# Patient Record
Sex: FEMALE | Race: WHITE | NOT HISPANIC OR LATINO | URBAN - METROPOLITAN AREA
[De-identification: names, ages, dates, MRNs, and addresses within clinical notes are randomized per-mention and may not be internally consistent; named-entity substitution may affect disease eponyms.]

---

## 2014-09-30 RX ORDER — ATORVASTATIN CALCIUM 80 MG/1
1 TABLET, FILM COATED ORAL
Qty: 0 | Refills: 0 | COMMUNITY
Start: 2014-09-30

## 2017-01-01 ENCOUNTER — INPATIENT (INPATIENT)
Facility: HOSPITAL | Age: 82
LOS: 3 days | Discharge: HOME | DRG: 185 | End: 2017-01-05
Attending: HOSPITALIST | Admitting: HOSPITALIST
Payer: MEDICARE

## 2017-01-01 VITALS
HEART RATE: 92 BPM | SYSTOLIC BLOOD PRESSURE: 128 MMHG | OXYGEN SATURATION: 94 % | DIASTOLIC BLOOD PRESSURE: 74 MMHG | TEMPERATURE: 98 F | RESPIRATION RATE: 18 BRPM
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
## 2017-01-01 DIAGNOSIS — Z41.8 ENCOUNTER FOR OTHER PROCEDURES FOR PURPOSES OTHER THAN REMEDYING HEALTH STATE: ICD-10-CM

## 2017-01-01 DIAGNOSIS — I10 ESSENTIAL (PRIMARY) HYPERTENSION: ICD-10-CM

## 2017-01-01 DIAGNOSIS — N18.9 CHRONIC KIDNEY DISEASE, UNSPECIFIED: ICD-10-CM

## 2017-01-01 DIAGNOSIS — G40.909 EPILEPSY, UNSPECIFIED, NOT INTRACTABLE, WITHOUT STATUS EPILEPTICUS: ICD-10-CM

## 2017-01-01 LAB
ALBUMIN SERPL ELPH-MCNC: 4 G/DL — SIGNIFICANT CHANGE UP (ref 3.3–5)
ALP SERPL-CCNC: 87 U/L — SIGNIFICANT CHANGE UP (ref 40–120)
ALT FLD-CCNC: 15 U/L RC — SIGNIFICANT CHANGE UP (ref 10–45)
ANION GAP SERPL CALC-SCNC: 16 MMOL/L — SIGNIFICANT CHANGE UP (ref 5–17)
APPEARANCE UR: CLEAR — SIGNIFICANT CHANGE UP
APTT BLD: 37.4 SEC — SIGNIFICANT CHANGE UP (ref 27.5–37.4)
AST SERPL-CCNC: 24 U/L — SIGNIFICANT CHANGE UP (ref 10–40)
BACTERIA # UR AUTO: ABNORMAL /HPF
BASOPHILS # BLD AUTO: 0 K/UL — SIGNIFICANT CHANGE UP (ref 0–0.2)
BASOPHILS NFR BLD AUTO: 0.5 % — SIGNIFICANT CHANGE UP (ref 0–2)
BILIRUB SERPL-MCNC: 0.3 MG/DL — SIGNIFICANT CHANGE UP (ref 0.2–1.2)
BILIRUB UR-MCNC: NEGATIVE — SIGNIFICANT CHANGE UP
BUN SERPL-MCNC: 51 MG/DL — HIGH (ref 7–23)
CALCIUM SERPL-MCNC: 9.3 MG/DL — SIGNIFICANT CHANGE UP (ref 8.4–10.5)
CHLORIDE SERPL-SCNC: 102 MMOL/L — SIGNIFICANT CHANGE UP (ref 96–108)
CK MB BLD-MCNC: 3.2 % — SIGNIFICANT CHANGE UP (ref 0–3.5)
CK MB CFR SERPL CALC: 3 NG/ML — SIGNIFICANT CHANGE UP (ref 0–3.8)
CK MB CFR SERPL CALC: 3.7 NG/ML — SIGNIFICANT CHANGE UP (ref 0–3.8)
CK SERPL-CCNC: 114 U/L — SIGNIFICANT CHANGE UP (ref 25–170)
CK SERPL-CCNC: 87 U/L — SIGNIFICANT CHANGE UP (ref 25–170)
CO2 SERPL-SCNC: 23 MMOL/L — SIGNIFICANT CHANGE UP (ref 22–31)
COLOR SPEC: YELLOW — SIGNIFICANT CHANGE UP
CREAT SERPL-MCNC: 1.34 MG/DL — HIGH (ref 0.5–1.3)
DIFF PNL FLD: NEGATIVE — SIGNIFICANT CHANGE UP
EOSINOPHIL # BLD AUTO: 0.2 K/UL — SIGNIFICANT CHANGE UP (ref 0–0.5)
EOSINOPHIL NFR BLD AUTO: 2.3 % — SIGNIFICANT CHANGE UP (ref 0–6)
GAS PNL BLDV: SIGNIFICANT CHANGE UP
GLUCOSE SERPL-MCNC: 122 MG/DL — HIGH (ref 70–99)
GLUCOSE UR QL: NEGATIVE — SIGNIFICANT CHANGE UP
HCT VFR BLD CALC: 38.6 % — SIGNIFICANT CHANGE UP (ref 34.5–45)
HGB BLD-MCNC: 12.5 G/DL — SIGNIFICANT CHANGE UP (ref 11.5–15.5)
INR BLD: 1.04 RATIO — SIGNIFICANT CHANGE UP (ref 0.88–1.16)
KETONES UR-MCNC: NEGATIVE — SIGNIFICANT CHANGE UP
LEUKOCYTE ESTERASE UR-ACNC: ABNORMAL
LYMPHOCYTES # BLD AUTO: 1.4 K/UL — SIGNIFICANT CHANGE UP (ref 1–3.3)
LYMPHOCYTES # BLD AUTO: 14.7 % — SIGNIFICANT CHANGE UP (ref 13–44)
MCHC RBC-ENTMCNC: 28.3 PG — SIGNIFICANT CHANGE UP (ref 27–34)
MCHC RBC-ENTMCNC: 32.3 GM/DL — SIGNIFICANT CHANGE UP (ref 32–36)
MCV RBC AUTO: 87.6 FL — SIGNIFICANT CHANGE UP (ref 80–100)
MONOCYTES # BLD AUTO: 1.2 K/UL — HIGH (ref 0–0.9)
MONOCYTES NFR BLD AUTO: 12.1 % — SIGNIFICANT CHANGE UP (ref 2–14)
NEUTROPHILS # BLD AUTO: 6.8 K/UL — SIGNIFICANT CHANGE UP (ref 1.8–7.4)
NEUTROPHILS NFR BLD AUTO: 70.5 % — SIGNIFICANT CHANGE UP (ref 43–77)
NITRITE UR-MCNC: NEGATIVE — SIGNIFICANT CHANGE UP
NT-PROBNP SERPL-SCNC: 1017 PG/ML — HIGH (ref 0–300)
PH UR: 6 — SIGNIFICANT CHANGE UP (ref 4.8–8)
PLATELET # BLD AUTO: 375 K/UL — SIGNIFICANT CHANGE UP (ref 150–400)
POTASSIUM SERPL-MCNC: 4.1 MMOL/L — SIGNIFICANT CHANGE UP (ref 3.5–5.3)
POTASSIUM SERPL-SCNC: 4.1 MMOL/L — SIGNIFICANT CHANGE UP (ref 3.5–5.3)
PROT SERPL-MCNC: 7.7 G/DL — SIGNIFICANT CHANGE UP (ref 6–8.3)
PROT UR-MCNC: 30 MG/DL
PROTHROM AB SERPL-ACNC: 11.2 SEC — SIGNIFICANT CHANGE UP (ref 10–13.1)
RAPID RVP RESULT: SIGNIFICANT CHANGE UP
RBC # BLD: 4.41 M/UL — SIGNIFICANT CHANGE UP (ref 3.8–5.2)
RBC # FLD: 14.4 % — SIGNIFICANT CHANGE UP (ref 10.3–14.5)
RBC CASTS # UR COMP ASSIST: SIGNIFICANT CHANGE UP /HPF (ref 0–2)
SODIUM SERPL-SCNC: 141 MMOL/L — SIGNIFICANT CHANGE UP (ref 135–145)
SP GR SPEC: 1.02 — SIGNIFICANT CHANGE UP (ref 1.01–1.02)
TROPONIN T SERPL-MCNC: 0.02 NG/ML — SIGNIFICANT CHANGE UP (ref 0–0.06)
TROPONIN T SERPL-MCNC: 0.03 NG/ML — SIGNIFICANT CHANGE UP (ref 0–0.06)
UROBILINOGEN FLD QL: NEGATIVE — SIGNIFICANT CHANGE UP
WBC # BLD: 9.7 K/UL — SIGNIFICANT CHANGE UP (ref 3.8–10.5)
WBC # FLD AUTO: 9.7 K/UL — SIGNIFICANT CHANGE UP (ref 3.8–10.5)
WBC UR QL: SIGNIFICANT CHANGE UP /HPF (ref 0–5)

## 2017-01-01 PROCEDURE — 72125 CT NECK SPINE W/O DYE: CPT | Mod: 26

## 2017-01-01 PROCEDURE — 99223 1ST HOSP IP/OBS HIGH 75: CPT | Mod: GC

## 2017-01-01 PROCEDURE — 72170 X-RAY EXAM OF PELVIS: CPT | Mod: 26

## 2017-01-01 PROCEDURE — 71010: CPT | Mod: 26

## 2017-01-01 PROCEDURE — 93010 ELECTROCARDIOGRAM REPORT: CPT

## 2017-01-01 PROCEDURE — 71250 CT THORAX DX C-: CPT | Mod: 26

## 2017-01-01 PROCEDURE — 76377 3D RENDER W/INTRP POSTPROCES: CPT | Mod: 26

## 2017-01-01 PROCEDURE — 70450 CT HEAD/BRAIN W/O DYE: CPT | Mod: 26

## 2017-01-01 PROCEDURE — 72192 CT PELVIS W/O DYE: CPT | Mod: 26

## 2017-01-01 PROCEDURE — 99285 EMERGENCY DEPT VISIT HI MDM: CPT | Mod: 25,GC

## 2017-01-01 RX ORDER — ALBUTEROL 90 UG/1
1.25 AEROSOL, METERED ORAL EVERY 6 HOURS
Qty: 0 | Refills: 0 | Status: DISCONTINUED | OUTPATIENT
Start: 2017-01-01 | End: 2017-01-01

## 2017-01-01 RX ORDER — ALBUTEROL 90 UG/1
1.25 AEROSOL, METERED ORAL EVERY 6 HOURS
Qty: 0 | Refills: 0 | Status: DISCONTINUED | OUTPATIENT
Start: 2017-01-01 | End: 2017-01-03

## 2017-01-01 RX ORDER — ATORVASTATIN CALCIUM 80 MG/1
20 TABLET, FILM COATED ORAL AT BEDTIME
Qty: 0 | Refills: 0 | Status: DISCONTINUED | OUTPATIENT
Start: 2017-01-01 | End: 2017-01-05

## 2017-01-01 RX ORDER — SODIUM CHLORIDE 9 MG/ML
1000 INJECTION INTRAMUSCULAR; INTRAVENOUS; SUBCUTANEOUS
Qty: 0 | Refills: 0 | Status: DISCONTINUED | OUTPATIENT
Start: 2017-01-01 | End: 2017-01-01

## 2017-01-01 RX ORDER — HEPARIN SODIUM 5000 [USP'U]/ML
5000 INJECTION INTRAVENOUS; SUBCUTANEOUS EVERY 12 HOURS
Qty: 0 | Refills: 0 | Status: DISCONTINUED | OUTPATIENT
Start: 2017-01-01 | End: 2017-01-05

## 2017-01-01 RX ORDER — ASPIRIN/CALCIUM CARB/MAGNESIUM 324 MG
81 TABLET ORAL DAILY
Qty: 0 | Refills: 0 | Status: DISCONTINUED | OUTPATIENT
Start: 2017-01-01 | End: 2017-01-05

## 2017-01-01 RX ORDER — LEVETIRACETAM 250 MG/1
250 TABLET, FILM COATED ORAL DAILY
Qty: 0 | Refills: 0 | Status: DISCONTINUED | OUTPATIENT
Start: 2017-01-01 | End: 2017-01-05

## 2017-01-01 RX ORDER — AMLODIPINE BESYLATE 2.5 MG/1
5 TABLET ORAL DAILY
Qty: 0 | Refills: 0 | Status: DISCONTINUED | OUTPATIENT
Start: 2017-01-01 | End: 2017-01-05

## 2017-01-01 RX ORDER — INFLUENZA VIRUS VACCINE 15; 15; 15; 15 UG/.5ML; UG/.5ML; UG/.5ML; UG/.5ML
0.5 SUSPENSION INTRAMUSCULAR ONCE
Qty: 0 | Refills: 0 | Status: COMPLETED | OUTPATIENT
Start: 2017-01-01 | End: 2017-01-01

## 2017-01-01 RX ORDER — ACETAMINOPHEN 500 MG
650 TABLET ORAL EVERY 6 HOURS
Qty: 0 | Refills: 0 | Status: DISCONTINUED | OUTPATIENT
Start: 2017-01-01 | End: 2017-01-05

## 2017-01-01 RX ORDER — BUDESONIDE, MICRONIZED 100 %
0.5 POWDER (GRAM) MISCELLANEOUS
Qty: 0 | Refills: 0 | Status: DISCONTINUED | OUTPATIENT
Start: 2017-01-01 | End: 2017-01-05

## 2017-01-01 RX ADMIN — ALBUTEROL 1.25 MILLIGRAM(S): 90 AEROSOL, METERED ORAL at 22:14

## 2017-01-01 RX ADMIN — Medication 650 MILLIGRAM(S): at 23:07

## 2017-01-01 RX ADMIN — ATORVASTATIN CALCIUM 20 MILLIGRAM(S): 80 TABLET, FILM COATED ORAL at 22:14

## 2017-01-01 RX ADMIN — Medication 650 MILLIGRAM(S): at 23:30

## 2017-01-01 RX ADMIN — Medication 0.5 MILLIGRAM(S): at 22:15

## 2017-01-01 RX ADMIN — SODIUM CHLORIDE 100 MILLILITER(S): 9 INJECTION INTRAMUSCULAR; INTRAVENOUS; SUBCUTANEOUS at 17:52

## 2017-01-01 RX ADMIN — HEPARIN SODIUM 5000 UNIT(S): 5000 INJECTION INTRAVENOUS; SUBCUTANEOUS at 22:14

## 2017-01-01 NOTE — H&P ADULT. - PMH
Epilepsy    HTN (hypertension) CVA (cerebral vascular accident)    Epilepsy    Fall    HTN (hypertension)    Pulmonary disease

## 2017-01-01 NOTE — ED ADULT TRIAGE NOTE - CCCP TRG CHIEF CMPLNT
other (specify)/found on floor at 11:00 today. Last seen at 1730 yesterday/bruise on back/pain in breastbone/nausea

## 2017-01-01 NOTE — H&P ADULT. - PROBLEM SELECTOR PLAN 1
-unwitnessed fall with unclear cause  -orthostatic was negative  -preliminary report of CTH, CT neck, CT pelvis were negative for acute findings, will f/u final report  -f/u TTE  -Hood negative x1, will f/u serial Hood  -tylenol for pain  -fall precaution and aspiration precaution  -f/u PT eval  -f/u EKG -unwitnessed fall with unclear cause  -orthostatic was negative  -preliminary report of CTH, CT neck, CT pelvis were negative for acute findings, will f/u final report  -f/u TTE  -Hood negative x1, will f/u serial Hood  -tylenol for pain  -fall precaution and aspiration precaution  -f/u PT eval  -f/u EKG  -C/W Tele monitoring   -Cardiology consult  -TSH, B12, Vit D level

## 2017-01-01 NOTE — H&P ADULT. - PROBLEM SELECTOR PLAN 4
-Cr 1.34, near base line of 1.4  -cont to monitor BMP Stage 3, Cr 1.34, near base line of 1.4  -cont to monitor BMP  -Avoid nephrotoxic medications

## 2017-01-01 NOTE — H&P ADULT. - HISTORY OF PRESENT ILLNESS
86 year old female, history of dementia, HTN, epilepsy on keppra, brought in by daughter because she thinks that she may have fallen 24 hours ago but was not found until 10:30 AM 86 F with PMH of dementia, HTN, epilepsy on keppra, brought in by daughter for an unwitnessed fall. Per daughter, patient lives alone with an help of an aide daily from 10AM to 5PM. The night PTA, patient was last seen well by the aide at 5pm. This morning, patient was found by the aide laying on floor next to the bed with bruises near both elbows. Per daughter, patient likely fell shortly after 5pm as bed was seen as unoccupied and the food in the house was uneatened. Patient does not remember how she fell but denies headache, dizziness, nausea, vomiting, fever, chill. At baseline, patient is able to walk to the bathroom unassisted but she requires assistance with ADLs.     In ED, T 98.1, HR 92, /74, RR 18, Sat 94 RA. preliminary report of CTH, CT neck and CT pelvis were negative for acute findings.

## 2017-01-01 NOTE — ED PROVIDER NOTE - INTERPRETATION
normal sinus rhythm, Normal axis, Normal NJ interval and QRS complex. There are no acute ischemic ST or T-wave changes.

## 2017-01-01 NOTE — ED PROVIDER NOTE - ATTENDING CONTRIBUTION TO CARE
I have examined and evaluated this patient with the above resident or PA, and agree with the documented clinical history, exam and plan.   Briefly: 86 F, h/o dementia, HTN, epilepsy on [facility], after she was found on floor around 10:30 AM, when her aide arrived.  Per daughter, last seen prior day, may have been on ground 12-24 hours.  Patient is a poor historian and unable to recount events surrounding day in question.  On exam, well appearing, afebrile; nrl heart sounds s1, s2; lungs ctabl, neuro: nonfocal, abd soft, nt/nd.  ECG shows NSR.  Unclear etiology of fall - syncope vs seizure vs mechanical fall; given age and comorbidities, normal head ct for age, will admit to tele for syncopal w/u.

## 2017-01-01 NOTE — H&P ADULT. - RADIOLOGY RESULTS AND INTERPRETATION
No acute finding on CTH, CT neck and CT pelvis No acute finding on CTH, CT neck and CT pelvis (Preliminary)

## 2017-01-01 NOTE — ED PROVIDER NOTE - OBJECTIVE STATEMENT
86 year old female, history of dementia, HTN, epilepsy on keppra, brought in by daughter because she thinks that she may have fallen 24 hours ago but was not found until 10:30 AM because that was when the aide came. Fall was unwitnessed, she was complaining of chest pain recently and nausea. Incontinent at baseline intermittently. She also fell a few days ago, also unwitnessed and was found by aide - had blood work done at that time. Acting at her baseline per daughter.   PMD Dr. Chi Martinez

## 2017-01-01 NOTE — H&P ADULT. - RS GEN PE MLT RESP DETAILS PC
good air movement/clear to auscultation bilaterally/breath sounds equal/respirations non-labored/airway patent respirations non-labored/no wheezes/clear to auscultation bilaterally/good air movement/breath sounds equal/airway patent

## 2017-01-01 NOTE — ED PROVIDER NOTE - MEDICAL DECISION MAKING DETAILS
86 year old female found down unwitnessed will work up for syncope vs. seizure vs. trip and fall, admit to tele for further work up 86 year old female found down, possible unwitnessed fall/syncope; will work up for syncope vs. seizure vs. trip and fall, admit to tele for further work up

## 2017-01-01 NOTE — ED ADULT NURSE REASSESSMENT NOTE - NS ED NURSE REASSESS COMMENT FT1
Patient straight cathed for urine using sterile technique with Bianka RN at bedside as secondary RN. 350cc yellow urine output. Patient tolerated well. Patient also changed into clean dry diaper.   Random patches of redness noted on patients right buttock; daughter states the redness is baseline from old wounds.

## 2017-01-01 NOTE — ED ADULT NURSE NOTE - OBJECTIVE STATEMENT
86 yr old female brought in by daughter; daughter states the aid found the patient on the ground. Daughter states she spoke to the aid last night once she left her mother's house; daughter believes the patient spent the night on the floor. Hx of dementia; pt A&Ox2 disoriented to time. Patient denies any specific pain at this time but with certain movement pt states "Ow everything hurts". Medium sized lump noted in the middle of patients back; daughter states that is new. No redness or bruising noted. Patient also has history of Epilepsy; on Keppra. Teeth and tongue intact. Pt placed on cardiac monitor; normal sinus. Follows all verbal commands. Pt ambulatory from wheelchair to bed with assistance.

## 2017-01-02 LAB
ANION GAP SERPL CALC-SCNC: 15 MMOL/L — SIGNIFICANT CHANGE UP (ref 5–17)
BASOPHILS # BLD AUTO: 0.04 K/UL — SIGNIFICANT CHANGE UP (ref 0–0.2)
BASOPHILS NFR BLD AUTO: 0.5 % — SIGNIFICANT CHANGE UP (ref 0–2)
BUN SERPL-MCNC: 46 MG/DL — HIGH (ref 7–23)
CALCIUM SERPL-MCNC: 8.7 MG/DL — SIGNIFICANT CHANGE UP (ref 8.4–10.5)
CHLORIDE SERPL-SCNC: 107 MMOL/L — SIGNIFICANT CHANGE UP (ref 96–108)
CO2 SERPL-SCNC: 19 MMOL/L — LOW (ref 22–31)
CREAT SERPL-MCNC: 1.18 MG/DL — SIGNIFICANT CHANGE UP (ref 0.5–1.3)
CULTURE RESULTS: NO GROWTH — SIGNIFICANT CHANGE UP
EOSINOPHIL # BLD AUTO: 0.36 K/UL — SIGNIFICANT CHANGE UP (ref 0–0.5)
EOSINOPHIL NFR BLD AUTO: 4.8 % — SIGNIFICANT CHANGE UP (ref 0–6)
GLUCOSE SERPL-MCNC: 76 MG/DL — SIGNIFICANT CHANGE UP (ref 70–99)
HCT VFR BLD CALC: 33.3 % — LOW (ref 34.5–45)
HGB BLD-MCNC: 10.4 G/DL — LOW (ref 11.5–15.5)
IMM GRANULOCYTES NFR BLD AUTO: 0.1 % — SIGNIFICANT CHANGE UP (ref 0–1.5)
LYMPHOCYTES # BLD AUTO: 2.1 K/UL — SIGNIFICANT CHANGE UP (ref 1–3.3)
LYMPHOCYTES # BLD AUTO: 27.9 % — SIGNIFICANT CHANGE UP (ref 13–44)
MAGNESIUM SERPL-MCNC: 2.2 MG/DL — SIGNIFICANT CHANGE UP (ref 1.6–2.6)
MCHC RBC-ENTMCNC: 26.9 PG — LOW (ref 27–34)
MCHC RBC-ENTMCNC: 31.2 GM/DL — LOW (ref 32–36)
MCV RBC AUTO: 86 FL — SIGNIFICANT CHANGE UP (ref 80–100)
MONOCYTES # BLD AUTO: 0.68 K/UL — SIGNIFICANT CHANGE UP (ref 0–0.9)
MONOCYTES NFR BLD AUTO: 9 % — SIGNIFICANT CHANGE UP (ref 2–14)
NEUTROPHILS # BLD AUTO: 4.35 K/UL — SIGNIFICANT CHANGE UP (ref 1.8–7.4)
NEUTROPHILS NFR BLD AUTO: 57.7 % — SIGNIFICANT CHANGE UP (ref 43–77)
PHOSPHATE SERPL-MCNC: 3.5 MG/DL — SIGNIFICANT CHANGE UP (ref 2.5–4.5)
PLATELET # BLD AUTO: 316 K/UL — SIGNIFICANT CHANGE UP (ref 150–400)
POTASSIUM SERPL-MCNC: 4.2 MMOL/L — SIGNIFICANT CHANGE UP (ref 3.5–5.3)
POTASSIUM SERPL-SCNC: 4.2 MMOL/L — SIGNIFICANT CHANGE UP (ref 3.5–5.3)
RBC # BLD: 3.87 M/UL — SIGNIFICANT CHANGE UP (ref 3.8–5.2)
RBC # FLD: 15 % — HIGH (ref 10.3–14.5)
SODIUM SERPL-SCNC: 141 MMOL/L — SIGNIFICANT CHANGE UP (ref 135–145)
SPECIMEN SOURCE: SIGNIFICANT CHANGE UP
TSH SERPL-MCNC: 1.61 UIU/ML — SIGNIFICANT CHANGE UP (ref 0.27–4.2)
VIT B12 SERPL-MCNC: 428 PG/ML — SIGNIFICANT CHANGE UP (ref 243–894)
WBC # BLD: 7.54 K/UL — SIGNIFICANT CHANGE UP (ref 3.8–10.5)
WBC # FLD AUTO: 7.54 K/UL — SIGNIFICANT CHANGE UP (ref 3.8–10.5)

## 2017-01-02 PROCEDURE — 99233 SBSQ HOSP IP/OBS HIGH 50: CPT | Mod: GC

## 2017-01-02 RX ORDER — OXYCODONE HYDROCHLORIDE 5 MG/1
2.5 TABLET ORAL EVERY 4 HOURS
Qty: 0 | Refills: 0 | Status: DISCONTINUED | OUTPATIENT
Start: 2017-01-02 | End: 2017-01-03

## 2017-01-02 RX ADMIN — ATORVASTATIN CALCIUM 20 MILLIGRAM(S): 80 TABLET, FILM COATED ORAL at 22:30

## 2017-01-02 RX ADMIN — AMLODIPINE BESYLATE 5 MILLIGRAM(S): 2.5 TABLET ORAL at 05:01

## 2017-01-02 RX ADMIN — Medication 650 MILLIGRAM(S): at 05:31

## 2017-01-02 RX ADMIN — Medication 650 MILLIGRAM(S): at 17:04

## 2017-01-02 RX ADMIN — Medication 650 MILLIGRAM(S): at 11:06

## 2017-01-02 RX ADMIN — HEPARIN SODIUM 5000 UNIT(S): 5000 INJECTION INTRAVENOUS; SUBCUTANEOUS at 05:01

## 2017-01-02 RX ADMIN — Medication 0.5 MILLIGRAM(S): at 08:13

## 2017-01-02 RX ADMIN — Medication 650 MILLIGRAM(S): at 12:00

## 2017-01-02 RX ADMIN — Medication 0.5 MILLIGRAM(S): at 22:30

## 2017-01-02 RX ADMIN — Medication 81 MILLIGRAM(S): at 11:06

## 2017-01-02 RX ADMIN — LEVETIRACETAM 250 MILLIGRAM(S): 250 TABLET, FILM COATED ORAL at 11:06

## 2017-01-02 RX ADMIN — Medication 650 MILLIGRAM(S): at 05:01

## 2017-01-02 RX ADMIN — HEPARIN SODIUM 5000 UNIT(S): 5000 INJECTION INTRAVENOUS; SUBCUTANEOUS at 17:04

## 2017-01-02 RX ADMIN — Medication 650 MILLIGRAM(S): at 18:00

## 2017-01-03 LAB
ANION GAP SERPL CALC-SCNC: 18 MMOL/L — HIGH (ref 5–17)
BUN SERPL-MCNC: 43 MG/DL — HIGH (ref 7–23)
CALCIUM SERPL-MCNC: 9.1 MG/DL — SIGNIFICANT CHANGE UP (ref 8.4–10.5)
CHLORIDE SERPL-SCNC: 109 MMOL/L — HIGH (ref 96–108)
CO2 SERPL-SCNC: 18 MMOL/L — LOW (ref 22–31)
CREAT SERPL-MCNC: 1.15 MG/DL — SIGNIFICANT CHANGE UP (ref 0.5–1.3)
GLUCOSE SERPL-MCNC: 79 MG/DL — SIGNIFICANT CHANGE UP (ref 70–99)
HCT VFR BLD CALC: 34.5 % — SIGNIFICANT CHANGE UP (ref 34.5–45)
HGB BLD-MCNC: 10.7 G/DL — LOW (ref 11.5–15.5)
LEVETIRACETAM SERPL-MCNC: 9.6 MCG/ML — LOW (ref 12–46)
MCHC RBC-ENTMCNC: 27 PG — SIGNIFICANT CHANGE UP (ref 27–34)
MCHC RBC-ENTMCNC: 31 GM/DL — LOW (ref 32–36)
MCV RBC AUTO: 86.9 FL — SIGNIFICANT CHANGE UP (ref 80–100)
PLATELET # BLD AUTO: 352 K/UL — SIGNIFICANT CHANGE UP (ref 150–400)
POTASSIUM SERPL-MCNC: 4.5 MMOL/L — SIGNIFICANT CHANGE UP (ref 3.5–5.3)
POTASSIUM SERPL-SCNC: 4.5 MMOL/L — SIGNIFICANT CHANGE UP (ref 3.5–5.3)
RBC # BLD: 3.97 M/UL — SIGNIFICANT CHANGE UP (ref 3.8–5.2)
RBC # FLD: 14.8 % — HIGH (ref 10.3–14.5)
SODIUM SERPL-SCNC: 145 MMOL/L — SIGNIFICANT CHANGE UP (ref 135–145)
TROPONIN T SERPL-MCNC: 0.02 NG/ML — SIGNIFICANT CHANGE UP (ref 0–0.06)
WBC # BLD: 9.12 K/UL — SIGNIFICANT CHANGE UP (ref 3.8–10.5)
WBC # FLD AUTO: 9.12 K/UL — SIGNIFICANT CHANGE UP (ref 3.8–10.5)

## 2017-01-03 PROCEDURE — 99223 1ST HOSP IP/OBS HIGH 75: CPT

## 2017-01-03 PROCEDURE — 99232 SBSQ HOSP IP/OBS MODERATE 35: CPT | Mod: GC

## 2017-01-03 RX ORDER — ALBUTEROL 90 UG/1
2.5 AEROSOL, METERED ORAL EVERY 6 HOURS
Qty: 0 | Refills: 0 | Status: DISCONTINUED | OUTPATIENT
Start: 2017-01-03 | End: 2017-01-05

## 2017-01-03 RX ORDER — ALBUTEROL 90 UG/1
1.25 AEROSOL, METERED ORAL EVERY 6 HOURS
Qty: 0 | Refills: 0 | Status: DISCONTINUED | OUTPATIENT
Start: 2017-01-03 | End: 2017-01-03

## 2017-01-03 RX ADMIN — LEVETIRACETAM 250 MILLIGRAM(S): 250 TABLET, FILM COATED ORAL at 11:26

## 2017-01-03 RX ADMIN — Medication 81 MILLIGRAM(S): at 11:27

## 2017-01-03 RX ADMIN — Medication 650 MILLIGRAM(S): at 12:20

## 2017-01-03 RX ADMIN — AMLODIPINE BESYLATE 5 MILLIGRAM(S): 2.5 TABLET ORAL at 11:27

## 2017-01-03 RX ADMIN — ATORVASTATIN CALCIUM 20 MILLIGRAM(S): 80 TABLET, FILM COATED ORAL at 21:28

## 2017-01-03 RX ADMIN — Medication 650 MILLIGRAM(S): at 01:10

## 2017-01-03 RX ADMIN — Medication 650 MILLIGRAM(S): at 11:26

## 2017-01-03 RX ADMIN — Medication 0.5 MILLIGRAM(S): at 21:28

## 2017-01-03 RX ADMIN — Medication 650 MILLIGRAM(S): at 18:04

## 2017-01-03 RX ADMIN — Medication 0.5 MILLIGRAM(S): at 11:26

## 2017-01-03 RX ADMIN — Medication 650 MILLIGRAM(S): at 00:49

## 2017-01-03 RX ADMIN — HEPARIN SODIUM 5000 UNIT(S): 5000 INJECTION INTRAVENOUS; SUBCUTANEOUS at 18:04

## 2017-01-03 RX ADMIN — Medication 650 MILLIGRAM(S): at 18:53

## 2017-01-03 RX ADMIN — Medication 650 MILLIGRAM(S): at 23:02

## 2017-01-03 NOTE — PHYSICAL THERAPY INITIAL EVALUATION ADULT - ADDITIONAL COMMENTS
contd from above: CT brain: (-); CT c/s:(-); CXR:(-); Xray pelvis: (-)fx    social history: pt poor historian, private aide at bedside provided history. pt has HHA 7 hours/7days. pt lives alone in apartment, (+)elevator, pt owns rolling walker, straight cane. aide assists with all ADLs

## 2017-01-03 NOTE — PHYSICAL THERAPY INITIAL EVALUATION ADULT - CRITERIA FOR SKILLED THERAPEUTIC INTERVENTIONS
therapy frequency/predicted duration of therapy intervention/anticipated equipment needs at discharge/anticipated discharge recommendation/impairments found

## 2017-01-03 NOTE — PHYSICAL THERAPY INITIAL EVALUATION ADULT - PERTINENT HX OF CURRENT PROBLEM, REHAB EVAL
86 F with PMH of dementia, HTN, epilepsy on keppra, brought in by daughter for an unwitnessed fall. Per daughter, patient lives alone with an help of an aide daily from 10AM to 5PM. The night PTA, patient was last seen well by the aide at 5pm. This morning, patient was found by the aide laying on floor next to the bed with bruises near both elbows. Per daughter, patient likely fell shortly after 5pm as bed was seen as unoccupied and the food in the house was uneatened.contd below:

## 2017-01-03 NOTE — PHYSICAL THERAPY INITIAL EVALUATION ADULT - DISCHARGE DISPOSITION, PT EVAL
for balance, gait and strengthening, pt will require assistance for ALL functional mobility/activities; pt only has HHA 7h/7days/home w/ home PT

## 2017-01-04 ENCOUNTER — TRANSCRIPTION ENCOUNTER (OUTPATIENT)
Age: 82
End: 2017-01-04

## 2017-01-04 LAB
ANION GAP SERPL CALC-SCNC: 11 MMOL/L — SIGNIFICANT CHANGE UP (ref 5–17)
BUN SERPL-MCNC: 34 MG/DL — HIGH (ref 7–23)
CALCIUM SERPL-MCNC: 8.9 MG/DL — SIGNIFICANT CHANGE UP (ref 8.4–10.5)
CHLORIDE SERPL-SCNC: 105 MMOL/L — SIGNIFICANT CHANGE UP (ref 96–108)
CO2 SERPL-SCNC: 22 MMOL/L — SIGNIFICANT CHANGE UP (ref 22–31)
CREAT SERPL-MCNC: 1.13 MG/DL — SIGNIFICANT CHANGE UP (ref 0.5–1.3)
GLUCOSE SERPL-MCNC: 90 MG/DL — SIGNIFICANT CHANGE UP (ref 70–99)
HCT VFR BLD CALC: 35.4 % — SIGNIFICANT CHANGE UP (ref 34.5–45)
HGB BLD-MCNC: 11.1 G/DL — LOW (ref 11.5–15.5)
MCHC RBC-ENTMCNC: 27 PG — SIGNIFICANT CHANGE UP (ref 27–34)
MCHC RBC-ENTMCNC: 31.4 GM/DL — LOW (ref 32–36)
MCV RBC AUTO: 86.1 FL — SIGNIFICANT CHANGE UP (ref 80–100)
PLATELET # BLD AUTO: 370 K/UL — SIGNIFICANT CHANGE UP (ref 150–400)
POTASSIUM SERPL-MCNC: 4.5 MMOL/L — SIGNIFICANT CHANGE UP (ref 3.5–5.3)
POTASSIUM SERPL-SCNC: 4.5 MMOL/L — SIGNIFICANT CHANGE UP (ref 3.5–5.3)
RBC # BLD: 4.11 M/UL — SIGNIFICANT CHANGE UP (ref 3.8–5.2)
RBC # FLD: 14.7 % — HIGH (ref 10.3–14.5)
SODIUM SERPL-SCNC: 138 MMOL/L — SIGNIFICANT CHANGE UP (ref 135–145)
WBC # BLD: 7.77 K/UL — SIGNIFICANT CHANGE UP (ref 3.8–10.5)
WBC # FLD AUTO: 7.77 K/UL — SIGNIFICANT CHANGE UP (ref 3.8–10.5)

## 2017-01-04 PROCEDURE — 93306 TTE W/DOPPLER COMPLETE: CPT | Mod: 26

## 2017-01-04 PROCEDURE — 99232 SBSQ HOSP IP/OBS MODERATE 35: CPT | Mod: GC

## 2017-01-04 PROCEDURE — 99233 SBSQ HOSP IP/OBS HIGH 50: CPT

## 2017-01-04 RX ADMIN — Medication 650 MILLIGRAM(S): at 14:43

## 2017-01-04 RX ADMIN — HEPARIN SODIUM 5000 UNIT(S): 5000 INJECTION INTRAVENOUS; SUBCUTANEOUS at 17:43

## 2017-01-04 RX ADMIN — ATORVASTATIN CALCIUM 20 MILLIGRAM(S): 80 TABLET, FILM COATED ORAL at 21:46

## 2017-01-04 RX ADMIN — Medication 650 MILLIGRAM(S): at 18:35

## 2017-01-04 RX ADMIN — Medication 650 MILLIGRAM(S): at 05:08

## 2017-01-04 RX ADMIN — Medication 650 MILLIGRAM(S): at 17:42

## 2017-01-04 RX ADMIN — Medication 650 MILLIGRAM(S): at 13:04

## 2017-01-04 RX ADMIN — LEVETIRACETAM 250 MILLIGRAM(S): 250 TABLET, FILM COATED ORAL at 16:05

## 2017-01-04 RX ADMIN — Medication 650 MILLIGRAM(S): at 17:45

## 2017-01-04 RX ADMIN — Medication 0.5 MILLIGRAM(S): at 21:46

## 2017-01-04 RX ADMIN — Medication 650 MILLIGRAM(S): at 00:02

## 2017-01-04 RX ADMIN — HEPARIN SODIUM 5000 UNIT(S): 5000 INJECTION INTRAVENOUS; SUBCUTANEOUS at 05:09

## 2017-01-04 RX ADMIN — AMLODIPINE BESYLATE 5 MILLIGRAM(S): 2.5 TABLET ORAL at 05:09

## 2017-01-04 RX ADMIN — Medication 81 MILLIGRAM(S): at 16:04

## 2017-01-04 NOTE — DISCHARGE NOTE ADULT - CARE PLAN
Principal Discharge DX:	Fall, initial encounter  Instructions for follow-up, activity and diet:	You were admitted due to a fall.  Secondary Diagnosis:	Epilepsy  Goal:	prevention of seizures  Instructions for follow-up, activity and diet:	please continue to take keppra as directed and follow up with your PMD within one week.  Secondary Diagnosis:	HTN (hypertension)  Goal:	Maintenance of blood pressure <150/90  Instructions for follow-up, activity and diet:	Please continue to take amlodipine as directed and follow up with your PMD within one week Principal Discharge DX:	Fall, initial encounter  Goal:	ruling out acute injury  Instructions for follow-up, activity and diet:	You were admitted due to a fall. CT head and CT neck and CT pelvis noted no acute traumatic injury. Please follow up with your PMD within one week.  Secondary Diagnosis:	Epilepsy  Goal:	prevention of seizures  Instructions for follow-up, activity and diet:	please continue to take keppra as directed and follow up with your PMD within one week.  Secondary Diagnosis:	HTN (hypertension)  Goal:	Maintenance of blood pressure <150/90  Instructions for follow-up, activity and diet:	Please continue to take amlodipine as directed and follow up with your PMD within one week  Secondary Diagnosis:	Rib fracture  Goal:	healing of rib fracture  Instructions for follow-up, activity and diet:	CT chest noted right sided rib fractures. Please follow up with your PMD within one week for further management

## 2017-01-04 NOTE — DISCHARGE NOTE ADULT - MEDICATION SUMMARY - MEDICATIONS TO TAKE
I will START or STAY ON the medications listed below when I get home from the hospital:    budesonide 0.5 mg/2 mL inhalation suspension  -- 2 milliliter(s) inhaled 2 times a day  -- Indication: For Pulmonary disease    aspirin 81 mg oral delayed release tablet  -- 1 tab(s) by mouth once a day  -- Indication: For CVA (cerebral vascular accident)    Keppra 250 mg oral tablet  -- 1 tab(s) by mouth once a day (at bedtime)  -- Indication: For Epilepsy    atorvastatin 20 mg oral tablet  -- 1 tab(s) by mouth once a day (at bedtime)  -- Indication: For CVA (cerebral vascular accident)    Xopenex HFA 45 mcg/inh inhalation aerosol  -- 2 puff(s) inhaled every 4 hours, As Needed  -- Indication: For Pulmonary disease    amLODIPine 5 mg oral tablet  -- 1 tab(s) by mouth once a day  -- Indication: For HTN (hypertension) I will START or STAY ON the medications listed below when I get home from the hospital:    budesonide 0.5 mg/2 mL inhalation suspension  -- 2 milliliter(s) inhaled 2 times a day  -- Indication: For Pulmonary disease    aspirin 81 mg oral delayed release tablet  -- 1 tab(s) by mouth once a day  -- Indication: For CVA (cerebral vascular accident)    Keppra 250 mg oral tablet  -- 1 tab(s) by mouth once a day (at bedtime)  -- Indication: For Epilepsy    atorvastatin 20 mg oral tablet  -- 1 tab(s) by mouth once a day (at bedtime)  -- Indication: For CVA (cerebral vascular accident)    albuterol 2.5 mg/3 mL (0.083%) inhalation solution  -- 2.5 milligram(s) inhaled every 6 hours, As Needed  -- Indication: For Pulmonary disease    amLODIPine 5 mg oral tablet  -- 1 tab(s) by mouth once a day  -- Indication: For HTN (hypertension)

## 2017-01-04 NOTE — DISCHARGE NOTE ADULT - CARE PROVIDER_API CALL
Edwin Briones), Internal Medicine; Pulmonary Disease  [address] [place] [address]  Phone: [phone]  Fax: [phone]    Jake Macias), Cardiovascular Disease; Internal Medicine; Nuclear Cardiology  [address] [id number]  Phone: [phone]  Fax: [phone]

## 2017-01-04 NOTE — DISCHARGE NOTE ADULT - PLAN OF CARE
prevention of seizures please continue to take keppra as directed and follow up with your PMD within one week. Maintenance of blood pressure <150/90 Please continue to take amlodipine as directed and follow up with your PMD within one week You were admitted due to a fall. healing of rib fracture CT chest noted right sided rib fractures. Please follow up with your PMD within one week for further management ruling out acute injury You were admitted due to a fall. CT head and CT neck and CT pelvis noted no acute traumatic injury. Please follow up with your PMD within one week.

## 2017-01-04 NOTE — DISCHARGE NOTE ADULT - MEDICATION SUMMARY - MEDICATIONS TO STOP TAKING
I will STOP taking the medications listed below when I get home from the hospital:  None I will STOP taking the medications listed below when I get home from the hospital:    Xopenex HFA 45 mcg/inh inhalation aerosol  -- 2 puff(s) inhaled every 4 hours, As Needed

## 2017-01-04 NOTE — DISCHARGE NOTE ADULT - CARE PROVIDERS DIRECT ADDRESSES
,[email or web address],DirectAddress_Unknown,DirectAddress_Unknown

## 2017-01-04 NOTE — DISCHARGE NOTE ADULT - PATIENT PORTAL LINK FT
“You can access the FollowHealth Patient Portal, offered by [facility], by registering with the following website: [email or web address]”

## 2017-01-04 NOTE — DISCHARGE NOTE ADULT - HOSPITAL COURSE
86 F with PMH of dementia, HTN, epilepsy on keppra, brought in by daughter for an unwitnessed fall. Per daughter, patient lives alone with an help of an aide daily from 10AM to 5PM. The night PTA, patient was last seen well by the aide at 5pm. This morning, patient was found by the aide laying on floor next to the bed with bruises near both elbows. Per daughter, patient likely fell shortly after 5pm as bed was seen as unoccupied and the food in the house was uneatened. Patient does not remember how she fell but denies headache, dizziness, nausea, vomiting, fever, chill. At baseline, patient is able to walk to the bathroom unassisted but she requires assistance with ADLs.     In ED, T 98.1, HR 92, /74, RR 18, Sat 94 RA. preliminary report of CTH, CT neck and CT pelvis were negative for acute findings.    Patient admitted to medicine. 86 F with PMH of dementia, HTN, epilepsy on keppra, brought in by daughter for an unwitnessed fall. Per daughter, patient lives alone with an help of an aide daily from 10AM to 5PM. The night PTA, patient was last seen well by the aide at 5pm. This morning, patient was found by the aide laying on floor next to the bed with bruises near both elbows. Per daughter, patient likely fell shortly after 5pm as bed was seen as unoccupied and the food in the house was uneatened. Patient does not remember how she fell but denies headache, dizziness, nausea, vomiting, fever, chill. At baseline, patient is able to walk to the bathroom unassisted but she requires assistance with ADLs.     In ED, T 98.1, HR 92, /74, RR 18, Sat 94 RA. CTH noted no acute intracranial hemorrhage or mass effect and No displaced calvarial   fracture. CT neck and CT pelvis were negative for acute fracture.    Patient admitted to medicine. TELE monitor started and patient were in NSR with no cardiac events. Orthostatic BP were negative. Hood were negative x3. RVP and Ucx were negative. CT chest noted acute nondisplaced fracture of right fourth and fifth ribs and patient was started on tylenol PRN for pain control. Patient's cardiologist and pulmonologist were consulted. TTE noted... PT evaluated the patient and recommended home with home PT. Patient is hemodynamically stable to discharge home. 86 F with PMH of dementia, HTN, epilepsy on keppra, brought in by daughter for an unwitnessed fall. Per daughter, patient lives alone with an help of an aide daily from 10AM to 5PM. The night PTA, patient was last seen well by the aide at 5pm. This morning, patient was found by the aide laying on floor next to the bed with bruises near both elbows. Per daughter, patient likely fell shortly after 5pm as bed was seen as unoccupied and the food in the house was uneatened. Patient does not remember how she fell but denies headache, dizziness, nausea, vomiting, fever, chill. At baseline, patient is able to walk to the bathroom unassisted but she requires assistance with ADLs.     In ED, T 98.1, HR 92, /74, RR 18, Sat 94 RA. CTH noted no acute intracranial hemorrhage or mass effect and No displaced calvarial   fracture. CT neck and CT pelvis were negative for acute fracture.    Patient admitted to medicine. TELE monitor started and patient were in NSR with no cardiac events. Orthostatic BP were negative. Hood were negative x3. RVP and Ucx were negative. CT chest noted acute nondisplaced fracture of right fourth and fifth ribs and patient was started on tylenol PRN for pain control. Patient's cardiologist and pulmonologist were consulted. TTE were performed. PT evaluated the patient and recommended home with home PT. Patient is hemodynamically stable to discharge home. 86 F with PMH of dementia, HTN, epilepsy on keppra, brought in by daughter for an unwitnessed fall. Per daughter, patient lives alone with an help of an aide daily from 10AM to 5PM. The night PTA, patient was last seen well by the aide at 5pm. This morning, patient was found by the aide laying on floor next to the bed with bruises near both elbows. Per daughter, patient likely fell shortly after 5pm as bed was seen as unoccupied and the food in the house was uneatened. Patient does not remember how she fell but denies headache, dizziness, nausea, vomiting, fever, chill. At baseline, patient is able to walk to the bathroom unassisted but she requires assistance with ADLs.     In ED, T 98.1, HR 92, /74, RR 18, Sat 94 RA. CTH noted no acute intracranial hemorrhage or mass effect and No displaced calvarial   fracture. CT neck and CT pelvis were negative for acute fracture.    Patient admitted to medicine. TELE monitor started and patient were in NSR with no cardiac events. Orthostatic BP were negative. Hood were negative x3. RVP and Ucx were negative. CT chest noted acute nondisplaced fracture of right fourth and fifth ribs and patient was started on tylenol PRN for pain control. Patient's cardiologist and pulmonologist were consulted. TTE showed Mild-moderate MR,  moderate MS,  mild-moderate AS, Severe AR, EF 75%. PT evaluated the patient and recommended home with home PT. Patient is hemodynamically stable to discharge home.

## 2017-01-05 VITALS
SYSTOLIC BLOOD PRESSURE: 144 MMHG | DIASTOLIC BLOOD PRESSURE: 79 MMHG | RESPIRATION RATE: 18 BRPM | OXYGEN SATURATION: 95 % | HEART RATE: 85 BPM | TEMPERATURE: 98 F

## 2017-01-05 PROCEDURE — 87086 URINE CULTURE/COLONY COUNT: CPT

## 2017-01-05 PROCEDURE — 99233 SBSQ HOSP IP/OBS HIGH 50: CPT

## 2017-01-05 PROCEDURE — 82803 BLOOD GASES ANY COMBINATION: CPT

## 2017-01-05 PROCEDURE — 99239 HOSP IP/OBS DSCHRG MGMT >30: CPT

## 2017-01-05 PROCEDURE — 84443 ASSAY THYROID STIM HORMONE: CPT

## 2017-01-05 PROCEDURE — 81001 URINALYSIS AUTO W/SCOPE: CPT

## 2017-01-05 PROCEDURE — 83880 ASSAY OF NATRIURETIC PEPTIDE: CPT

## 2017-01-05 PROCEDURE — 87486 CHLMYD PNEUM DNA AMP PROBE: CPT

## 2017-01-05 PROCEDURE — 51701 INSERT BLADDER CATHETER: CPT

## 2017-01-05 PROCEDURE — 82947 ASSAY GLUCOSE BLOOD QUANT: CPT

## 2017-01-05 PROCEDURE — 85730 THROMBOPLASTIN TIME PARTIAL: CPT

## 2017-01-05 PROCEDURE — 87798 DETECT AGENT NOS DNA AMP: CPT

## 2017-01-05 PROCEDURE — 99285 EMERGENCY DEPT VISIT HI MDM: CPT | Mod: 25

## 2017-01-05 PROCEDURE — 87633 RESP VIRUS 12-25 TARGETS: CPT

## 2017-01-05 PROCEDURE — 87581 M.PNEUMON DNA AMP PROBE: CPT

## 2017-01-05 PROCEDURE — 71045 X-RAY EXAM CHEST 1 VIEW: CPT

## 2017-01-05 PROCEDURE — 94640 AIRWAY INHALATION TREATMENT: CPT

## 2017-01-05 PROCEDURE — 70450 CT HEAD/BRAIN W/O DYE: CPT

## 2017-01-05 PROCEDURE — 84132 ASSAY OF SERUM POTASSIUM: CPT

## 2017-01-05 PROCEDURE — 72125 CT NECK SPINE W/O DYE: CPT

## 2017-01-05 PROCEDURE — 97161 PT EVAL LOW COMPLEX 20 MIN: CPT

## 2017-01-05 PROCEDURE — 82550 ASSAY OF CK (CPK): CPT

## 2017-01-05 PROCEDURE — 84484 ASSAY OF TROPONIN QUANT: CPT

## 2017-01-05 PROCEDURE — 85014 HEMATOCRIT: CPT

## 2017-01-05 PROCEDURE — 85610 PROTHROMBIN TIME: CPT

## 2017-01-05 PROCEDURE — 82435 ASSAY OF BLOOD CHLORIDE: CPT

## 2017-01-05 PROCEDURE — 76377 3D RENDER W/INTRP POSTPROCES: CPT

## 2017-01-05 PROCEDURE — 80048 BASIC METABOLIC PNL TOTAL CA: CPT

## 2017-01-05 PROCEDURE — 85027 COMPLETE CBC AUTOMATED: CPT

## 2017-01-05 PROCEDURE — 72192 CT PELVIS W/O DYE: CPT

## 2017-01-05 PROCEDURE — 84100 ASSAY OF PHOSPHORUS: CPT

## 2017-01-05 PROCEDURE — 83605 ASSAY OF LACTIC ACID: CPT

## 2017-01-05 PROCEDURE — 93005 ELECTROCARDIOGRAM TRACING: CPT | Mod: XU

## 2017-01-05 PROCEDURE — 84295 ASSAY OF SERUM SODIUM: CPT

## 2017-01-05 PROCEDURE — 93306 TTE W/DOPPLER COMPLETE: CPT

## 2017-01-05 PROCEDURE — 82330 ASSAY OF CALCIUM: CPT

## 2017-01-05 PROCEDURE — 80177 DRUG SCRN QUAN LEVETIRACETAM: CPT

## 2017-01-05 PROCEDURE — 71250 CT THORAX DX C-: CPT

## 2017-01-05 PROCEDURE — 80053 COMPREHEN METABOLIC PANEL: CPT

## 2017-01-05 PROCEDURE — 83735 ASSAY OF MAGNESIUM: CPT

## 2017-01-05 PROCEDURE — 82553 CREATINE MB FRACTION: CPT

## 2017-01-05 PROCEDURE — 82607 VITAMIN B-12: CPT

## 2017-01-05 PROCEDURE — 72170 X-RAY EXAM OF PELVIS: CPT

## 2017-01-05 RX ORDER — LEVALBUTEROL 1.25 MG/.5ML
2 SOLUTION, CONCENTRATE RESPIRATORY (INHALATION)
Qty: 0 | Refills: 0 | COMMUNITY

## 2017-01-05 RX ORDER — ALBUTEROL 90 UG/1
2.5 AEROSOL, METERED ORAL
Qty: 0 | Refills: 0 | COMMUNITY
Start: 2017-01-05

## 2017-01-05 RX ADMIN — Medication 81 MILLIGRAM(S): at 12:31

## 2017-01-05 RX ADMIN — HEPARIN SODIUM 5000 UNIT(S): 5000 INJECTION INTRAVENOUS; SUBCUTANEOUS at 05:00

## 2017-01-05 RX ADMIN — Medication 650 MILLIGRAM(S): at 00:08

## 2017-01-05 RX ADMIN — AMLODIPINE BESYLATE 5 MILLIGRAM(S): 2.5 TABLET ORAL at 05:00

## 2017-01-05 RX ADMIN — Medication 650 MILLIGRAM(S): at 00:38

## 2017-01-05 RX ADMIN — Medication 0.5 MILLIGRAM(S): at 08:34

## 2017-01-17 ENCOUNTER — APPOINTMENT (OUTPATIENT)
Dept: INTERNAL MEDICINE | Facility: CLINIC | Age: 82
End: 2017-01-17

## 2017-01-17 ENCOUNTER — MESSAGE (OUTPATIENT)
Age: 82
End: 2017-01-17

## 2017-01-17 VITALS
SYSTOLIC BLOOD PRESSURE: 120 MMHG | WEIGHT: 144 LBS | DIASTOLIC BLOOD PRESSURE: 60 MMHG | OXYGEN SATURATION: 92 % | HEIGHT: 62 IN | BODY MASS INDEX: 26.5 KG/M2 | TEMPERATURE: 98.1 F | HEART RATE: 84 BPM

## 2017-01-23 ENCOUNTER — MEDICATION RENEWAL (OUTPATIENT)
Age: 82
End: 2017-01-23

## 2017-01-27 NOTE — ED ADULT NURSE NOTE - CAS TRG GEN SKIN CONDITION
HPI


Chief Complaint:  Foreign Body


Time Seen by Provider:  18:44


Travel History


International Travel<30 days:  No


Contact w/Intl Traveler<30days:  No


Traveled to known affect area:  No





History of Present Illness


HPI


52-year-old female presents to the emergency room for evaluation of possible 

foreign body to her right plantar foot.  Patient states she stepped on a 

toothpick or match 5 days ago.  She removed most of the foreign body but states 

she felt he retained foreign body sensation afterward.  States every time she 

stepped down she can feel a sharp sensation.  She has been favoring her right 

foot causing increased muscle pain.  She tried to remove the foreign body with 

a heated needle 2 days after initial onset without success.  She has also been 

soaking her foot in Epsom salt.





PFSH


Past Medical History


Anemia:  Yes


Asthma:  Yes (As child)


Blood Disorders:  No


Heart Rhythm Problems:  No


Cancer:  No


Cardiac Catheterization:  No


High Cholesterol:  No


Chemotherapy:  No


Congestive Heart Failure:  No


Diabetes:  No


Diminished Hearing:  No


Endocrine:  No


Gastrointestinal Disorders:  No


Herniated Disk:  Yes


Hypertension:  No


Immune Disorder:  No


Kidney Stones:  No


Musculoskeletal:  Yes (CHRONIC LOW BACK PAIN S/P INJURY AT WORK)


Neurologic:  No


Psychiatric:  No


Reproductive:  No


Respiratory:  Yes


Immunizations Current:  Yes


Migraines:  No


Radiation Therapy:  No


Renal Failure:  No


Tetanus Vaccination:  < 5 Years


Influenza Vaccination:  Yes


Pregnant?:  Not Pregnant


Menopausal:  Yes


:  5


Para:  3


Miscarriage:  1


:  1





Past Surgical History


Cardiac Surgery:  Yes (CARDIAC CATH 2 WEEKS AGO)


Coronary Artery Bypass Graft:  No


Neurologic Surgery:  No


Pacemaker:  No


Other Surgery:  No





Social History


Alcohol Use:  No


Tobacco Use:  No


Substance Use:  No





Allergies-Medications


(Allergen,Severity, Reaction):  


Coded Allergies:  


     Aspirin (Verified  Allergy, Severe, EYES SWELL, 17)


 UNKNOWN


     Codeine (Verified  Allergy, Severe, ITCHING, 17)


 UNKNOWN


     Tramadol (Verified  Allergy, Unknown, Nausea/Vomiting, 17)


     *MDRO Multi-Drug Resistant Organism (Verified  Adverse Reaction, Unknown, )


 MRSA axilla wound 2014


 MRSA elbow wound 10/2015


Reported Meds & Prescriptions





Reported Meds & Active Scripts


Active


Reported


Motrin Ib (Ibuprofen) 200 Mg Tab 800 Mg PO Q6H PRN


Methocarbamol 500 Mg Tab 500 Mg PO QID








Review of Systems


Except as stated in HPI:  all other systems reviewed are Neg





Physical Exam


Narrative


GENERAL: Well-nourished, well-developed female in no acute distress.  Afebrile.

  Ambulatory.


SKIN: Warm and dry. There is a slightly indurated area in the right plantar 

foot which measures about 0.5 cm in diameter.  No pointing, drainage, 

inflammation, or lymphangitis.  Tender to palpation.


HEAD: Normocephalic.


EYES: No scleral icterus. No injection or drainage. 


NECK: Supple, trachea midline. No JVD or lymphadenopathy.





Data


Data


Last Documented VS





Vital Signs








  Date Time  Temp Pulse Resp B/P Pulse Ox O2 Delivery O2 Flow Rate FiO2


 


17 16:48 97.9 64 16 115/63 100   








Orders





 Lidocaine 1% Inj (50 Ml) (Xylocaine 1% I (17 18:45)








MDM


Medical Decision Making


Medical Screen Exam Complete:  Yes


Emergency Medical Condition:  Yes


Medical Record Reviewed:  Yes


Differential Diagnosis


Foreign body versus plantar wart versus abscess


Narrative Course


52-year-old female presents to the emergency room for evaluation of possible 

foreign body to the right plantar foot for the past 5 days.  Patient stepped on 

a match or toothpick and believes there is a retained piece.  She has continued 

pain with ambulation.  Physical exam reveals a small 0.5 cm area of induration 

on the right plantar foot just below the great toe.  No fluctuance, inflammation

, or lymphangitis.  Area was numbed and explored without obvious foreign body 

but there was purulent drainage.  Patient was given Bactrim and Keflex and told 

to follow up with a podiatrist to return for worsening symptoms.  She 

understands and agrees to plan.





Procedures


**Procedure Narrative**


INCISION AND DRAINAGE OF ABSCESS: The area was prepped and was sterilely 

draped.  A subcutaneous wheal of 1% lidocaine with a total number 2 mL was used 

to anesthetize the area properly.  A number 11 scalpel was used to make a 0.5 

cm incision across the area of the abscess.  The abscess was drained, complex 

loculations were broken down, and irrigated with normal saline.  Cultures were 

obtained.  Sterile dressing applied.





Diagnosis





 Primary Impression:  


 Abscess of plantar aspect of foot


Referrals:  


Podiatrist





Primary Care Physician


Patient Instructions:  Abscess (ED), General Instructions





***Additional Instructions:


Rest and drink plenty of fluids.


Take Bactrim and Keflex as directed, until gone.


Follow up with a primary care physician.


Return to emergency room for worsening symptoms, as discussed.


Scripts


Cephalexin (Keflex)500 Mg Fgg215 Mg PO Q6H  10 Days  Ref 0


   Prov:Swapnil Thomas MD         17 


Sulfamethoxazole-Trimethoprim (Bactrim DS)800-160 Mg Tab1 Tab PO BID  #20 TAB  

Ref 0


   Prov:Swapnil Thomas MD         17


Disposition:  01 DISCHARGE HOME


Condition:  Stable








Saida Anton 2017 18:53
Warm

## 2017-02-02 ENCOUNTER — APPOINTMENT (OUTPATIENT)
Dept: PULMONOLOGY | Facility: CLINIC | Age: 82
End: 2017-02-02

## 2017-02-02 VITALS
RESPIRATION RATE: 17 BRPM | OXYGEN SATURATION: 98 % | HEIGHT: 59 IN | BODY MASS INDEX: 23.18 KG/M2 | SYSTOLIC BLOOD PRESSURE: 110 MMHG | WEIGHT: 115 LBS | DIASTOLIC BLOOD PRESSURE: 70 MMHG | HEART RATE: 90 BPM

## 2017-02-02 DIAGNOSIS — Z86.73 PERSONAL HISTORY OF TRANSIENT ISCHEMIC ATTACK (TIA), AND CEREBRAL INFARCTION W/OUT RESIDUAL DEFICITS: ICD-10-CM
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
## 2017-02-02 PROBLEM — I63.9 CEREBRAL INFARCTION, UNSPECIFIED: Chronic | Status: ACTIVE | Noted: 2017-01-01

## 2017-02-02 PROBLEM — J98.4 OTHER DISORDERS OF LUNG: Chronic | Status: ACTIVE | Noted: 2017-01-01

## 2017-03-23 ENCOUNTER — APPOINTMENT (OUTPATIENT)
Dept: INTERNAL MEDICINE | Facility: CLINIC | Age: 82
End: 2017-03-23

## 2017-03-23 VITALS
BODY MASS INDEX: 23.49 KG/M2 | OXYGEN SATURATION: 96 % | HEART RATE: 101 BPM | HEIGHT: 58.5 IN | TEMPERATURE: 98.3 F | SYSTOLIC BLOOD PRESSURE: 118 MMHG | DIASTOLIC BLOOD PRESSURE: 70 MMHG | WEIGHT: 115 LBS

## 2017-03-26 RX ORDER — ATORVASTATIN CALCIUM 10 MG/1
10 TABLET, FILM COATED ORAL
Qty: 90 | Refills: 0 | Status: DISCONTINUED | COMMUNITY
Start: 2017-02-02

## 2017-03-26 RX ORDER — GENTAMICIN SULFATE 1 MG/G
0.1 CREAM TOPICAL
Qty: 30 | Refills: 0 | Status: DISCONTINUED | COMMUNITY
Start: 2017-03-23

## 2017-04-03 ENCOUNTER — APPOINTMENT (OUTPATIENT)
Dept: OTOLARYNGOLOGY | Facility: CLINIC | Age: 82
End: 2017-04-03

## 2017-04-03 VITALS
SYSTOLIC BLOOD PRESSURE: 144 MMHG | HEART RATE: 92 BPM | BODY MASS INDEX: 22.48 KG/M2 | WEIGHT: 110 LBS | DIASTOLIC BLOOD PRESSURE: 85 MMHG | HEIGHT: 58.5 IN

## 2017-05-04 ENCOUNTER — APPOINTMENT (OUTPATIENT)
Dept: PULMONOLOGY | Facility: CLINIC | Age: 82
End: 2017-05-04

## 2017-05-04 VITALS
OXYGEN SATURATION: 100 % | DIASTOLIC BLOOD PRESSURE: 70 MMHG | WEIGHT: 114 LBS | SYSTOLIC BLOOD PRESSURE: 136 MMHG | HEART RATE: 82 BPM | HEIGHT: 58.5 IN | BODY MASS INDEX: 23.29 KG/M2 | RESPIRATION RATE: 17 BRPM

## 2017-05-04 DIAGNOSIS — I51.9 HEART DISEASE, UNSPECIFIED: ICD-10-CM

## 2017-05-04 DIAGNOSIS — Z87.09 PERSONAL HISTORY OF OTHER DISEASES OF THE RESPIRATORY SYSTEM: ICD-10-CM

## 2017-05-09 ENCOUNTER — APPOINTMENT (OUTPATIENT)
Dept: HOME HEALTH SERVICES | Facility: HOME HEALTH | Age: 82
End: 2017-05-09

## 2017-05-09 VITALS
OXYGEN SATURATION: 94 % | SYSTOLIC BLOOD PRESSURE: 120 MMHG | TEMPERATURE: 97.3 F | DIASTOLIC BLOOD PRESSURE: 80 MMHG | RESPIRATION RATE: 17 BRPM | HEART RATE: 85 BPM

## 2017-05-09 VITALS
DIASTOLIC BLOOD PRESSURE: 80 MMHG | HEART RATE: 68 BPM | OXYGEN SATURATION: 98 % | RESPIRATION RATE: 18 BRPM | SYSTOLIC BLOOD PRESSURE: 120 MMHG

## 2017-05-09 DIAGNOSIS — R06.9 UNSPECIFIED ABNORMALITIES OF BREATHING: ICD-10-CM

## 2017-05-09 DIAGNOSIS — R93.8 ABNORMAL FINDINGS ON DIAGNOSTIC IMAGING OF OTHER SPECIFIED BODY STRUCTURES: ICD-10-CM

## 2017-05-09 DIAGNOSIS — N18.9 CHRONIC KIDNEY DISEASE, UNSPECIFIED: ICD-10-CM

## 2017-05-09 DIAGNOSIS — M62.81 MUSCLE WEAKNESS (GENERALIZED): ICD-10-CM

## 2017-05-09 DIAGNOSIS — Z72.820 SLEEP DEPRIVATION: ICD-10-CM

## 2017-05-09 DIAGNOSIS — Z86.39 PERSONAL HISTORY OF OTHER ENDOCRINE, NUTRITIONAL AND METABOLIC DISEASE: ICD-10-CM

## 2017-05-09 DIAGNOSIS — R19.5 OTHER FECAL ABNORMALITIES: ICD-10-CM

## 2017-05-09 DIAGNOSIS — I63.9 CEREBRAL INFARCTION, UNSPECIFIED: ICD-10-CM

## 2017-05-09 DIAGNOSIS — H61.23 IMPACTED CERUMEN, BILATERAL: ICD-10-CM

## 2017-05-09 DIAGNOSIS — H91.90 UNSPECIFIED HEARING LOSS, UNSPECIFIED EAR: ICD-10-CM

## 2017-05-09 DIAGNOSIS — Z87.898 PERSONAL HISTORY OF OTHER SPECIFIED CONDITIONS: ICD-10-CM

## 2017-05-09 DIAGNOSIS — R79.89 OTHER SPECIFIED ABNORMAL FINDINGS OF BLOOD CHEMISTRY: ICD-10-CM

## 2017-05-09 DIAGNOSIS — Z87.09 PERSONAL HISTORY OF OTHER DISEASES OF THE RESPIRATORY SYSTEM: ICD-10-CM

## 2017-05-09 DIAGNOSIS — N17.9 ACUTE KIDNEY FAILURE, UNSPECIFIED: ICD-10-CM

## 2017-05-09 DIAGNOSIS — R41.89 OTHER SYMPTOMS AND SIGNS INVOLVING COGNITIVE FUNCTIONS AND AWARENESS: ICD-10-CM

## 2017-05-09 DIAGNOSIS — M25.473 EFFUSION, UNSPECIFIED ANKLE: ICD-10-CM

## 2017-05-10 PROBLEM — M25.473 ANKLE EDEMA: Status: RESOLVED | Noted: 2017-03-24 | Resolved: 2017-05-10

## 2017-05-10 PROBLEM — H61.23 BILATERAL IMPACTED CERUMEN: Status: RESOLVED | Noted: 2017-04-03 | Resolved: 2017-05-10

## 2017-05-16 ENCOUNTER — LABORATORY RESULT (OUTPATIENT)
Age: 82
End: 2017-05-16

## 2017-05-25 ENCOUNTER — CLINICAL ADVICE (OUTPATIENT)
Age: 82
End: 2017-05-25

## 2017-05-26 ENCOUNTER — CLINICAL ADVICE (OUTPATIENT)
Age: 82
End: 2017-05-26

## 2017-05-30 ENCOUNTER — MEDICATION RENEWAL (OUTPATIENT)
Age: 82
End: 2017-05-30

## 2017-05-31 ENCOUNTER — RX RENEWAL (OUTPATIENT)
Age: 82
End: 2017-05-31

## 2017-06-08 ENCOUNTER — APPOINTMENT (OUTPATIENT)
Dept: HOME HEALTH SERVICES | Facility: HOME HEALTH | Age: 82
End: 2017-06-08

## 2017-06-08 VITALS
OXYGEN SATURATION: 97 % | HEART RATE: 80 BPM | RESPIRATION RATE: 16 BRPM | TEMPERATURE: 98.8 F | DIASTOLIC BLOOD PRESSURE: 70 MMHG | SYSTOLIC BLOOD PRESSURE: 140 MMHG

## 2017-06-08 RX ORDER — AZITHROMYCIN 250 MG/1
250 TABLET, FILM COATED ORAL
Qty: 1 | Refills: 0 | Status: COMPLETED | COMMUNITY
Start: 2017-05-26 | End: 2017-06-08

## 2017-07-14 ENCOUNTER — RX RENEWAL (OUTPATIENT)
Age: 82
End: 2017-07-14

## 2017-08-15 ENCOUNTER — APPOINTMENT (OUTPATIENT)
Dept: HOME HEALTH SERVICES | Facility: HOME HEALTH | Age: 82
End: 2017-08-15
Payer: MEDICARE

## 2017-08-15 VITALS
SYSTOLIC BLOOD PRESSURE: 135 MMHG | OXYGEN SATURATION: 95 % | RESPIRATION RATE: 18 BRPM | DIASTOLIC BLOOD PRESSURE: 75 MMHG | HEART RATE: 75 BPM

## 2017-08-15 DIAGNOSIS — Z87.898 PERSONAL HISTORY OF OTHER SPECIFIED CONDITIONS: ICD-10-CM

## 2017-08-15 DIAGNOSIS — L85.3 XEROSIS CUTIS: ICD-10-CM

## 2017-08-15 DIAGNOSIS — H90.3 SENSORINEURAL HEARING LOSS, BILATERAL: ICD-10-CM

## 2017-08-15 PROCEDURE — 99350 HOME/RES VST EST HIGH MDM 60: CPT

## 2017-08-16 PROBLEM — H90.3 BILATERAL HIGH FREQUENCY SENSORINEURAL HEARING LOSS: Status: RESOLVED | Noted: 2017-04-03 | Resolved: 2017-08-16

## 2017-08-16 PROBLEM — L85.3 XEROSIS OF SKIN: Status: RESOLVED | Noted: 2017-06-08 | Resolved: 2017-08-16

## 2017-10-17 ENCOUNTER — RX RENEWAL (OUTPATIENT)
Age: 82
End: 2017-10-17

## 2017-10-25 ENCOUNTER — RX RENEWAL (OUTPATIENT)
Age: 82
End: 2017-10-25

## 2017-10-31 ENCOUNTER — APPOINTMENT (OUTPATIENT)
Dept: HOME HEALTH SERVICES | Facility: HOME HEALTH | Age: 82
End: 2017-10-31
Payer: MEDICARE

## 2017-10-31 VITALS
DIASTOLIC BLOOD PRESSURE: 60 MMHG | OXYGEN SATURATION: 97 % | HEART RATE: 70 BPM | SYSTOLIC BLOOD PRESSURE: 142 MMHG | RESPIRATION RATE: 18 BRPM

## 2017-10-31 DIAGNOSIS — Z86.39 PERSONAL HISTORY OF OTHER ENDOCRINE, NUTRITIONAL AND METABOLIC DISEASE: ICD-10-CM

## 2017-10-31 PROCEDURE — 99350 HOME/RES VST EST HIGH MDM 60: CPT

## 2017-11-17 ENCOUNTER — CLINICAL ADVICE (OUTPATIENT)
Age: 82
End: 2017-11-17

## 2017-11-17 ENCOUNTER — INPATIENT (INPATIENT)
Facility: HOSPITAL | Age: 82
LOS: 9 days | Discharge: HOME | DRG: 280 | End: 2017-11-27
Attending: HOSPITALIST | Admitting: HOSPITALIST
Payer: MEDICARE

## 2017-11-17 VITALS
HEART RATE: 133 BPM | SYSTOLIC BLOOD PRESSURE: 105 MMHG | OXYGEN SATURATION: 97 % | TEMPERATURE: 98 F | RESPIRATION RATE: 22 BRPM | DIASTOLIC BLOOD PRESSURE: 66 MMHG

## 2017-11-17 DIAGNOSIS — R65.10 SYSTEMIC INFLAMMATORY RESPONSE SYNDROME (SIRS) OF NON-INFECTIOUS ORIGIN WITHOUT ACUTE ORGAN DYSFUNCTION: ICD-10-CM

## 2017-11-17 DIAGNOSIS — I48.91 UNSPECIFIED ATRIAL FIBRILLATION: ICD-10-CM

## 2017-11-17 DIAGNOSIS — Z29.9 ENCOUNTER FOR PROPHYLACTIC MEASURES, UNSPECIFIED: ICD-10-CM

## 2017-11-17 DIAGNOSIS — R74.8 ABNORMAL LEVELS OF OTHER SERUM ENZYMES: ICD-10-CM

## 2017-11-17 DIAGNOSIS — J98.4 OTHER DISORDERS OF LUNG: ICD-10-CM

## 2017-11-17 DIAGNOSIS — N17.9 ACUTE KIDNEY FAILURE, UNSPECIFIED: ICD-10-CM

## 2017-11-17 DIAGNOSIS — I63.9 CEREBRAL INFARCTION, UNSPECIFIED: ICD-10-CM

## 2017-11-17 DIAGNOSIS — J18.9 PNEUMONIA, UNSPECIFIED ORGANISM: ICD-10-CM

## 2017-11-17 DIAGNOSIS — G40.909 EPILEPSY, UNSPECIFIED, NOT INTRACTABLE, WITHOUT STATUS EPILEPTICUS: ICD-10-CM

## 2017-11-17 DIAGNOSIS — F03.90 UNSPECIFIED DEMENTIA WITHOUT BEHAVIORAL DISTURBANCE: ICD-10-CM

## 2017-11-17 LAB
ALBUMIN SERPL ELPH-MCNC: 3.8 G/DL — SIGNIFICANT CHANGE UP (ref 3.3–5)
ALP SERPL-CCNC: 91 U/L — SIGNIFICANT CHANGE UP (ref 40–120)
ALT FLD-CCNC: 41 U/L RC — SIGNIFICANT CHANGE UP (ref 10–45)
ANION GAP SERPL CALC-SCNC: 16 MMOL/L — SIGNIFICANT CHANGE UP (ref 5–17)
APTT BLD: 33.8 SEC — SIGNIFICANT CHANGE UP (ref 27.5–37.4)
AST SERPL-CCNC: 47 U/L — HIGH (ref 10–40)
BASE EXCESS BLDV CALC-SCNC: -2.2 MMOL/L — LOW (ref -2–2)
BASOPHILS # BLD AUTO: 0 K/UL — SIGNIFICANT CHANGE UP (ref 0–0.2)
BASOPHILS NFR BLD AUTO: 0.4 % — SIGNIFICANT CHANGE UP (ref 0–2)
BILIRUB SERPL-MCNC: 0.4 MG/DL — SIGNIFICANT CHANGE UP (ref 0.2–1.2)
BUN SERPL-MCNC: 52 MG/DL — HIGH (ref 7–23)
CA-I SERPL-SCNC: 1.2 MMOL/L — SIGNIFICANT CHANGE UP (ref 1.12–1.3)
CALCIUM SERPL-MCNC: 9.3 MG/DL — SIGNIFICANT CHANGE UP (ref 8.4–10.5)
CHLORIDE BLDV-SCNC: 107 MMOL/L — SIGNIFICANT CHANGE UP (ref 96–108)
CHLORIDE SERPL-SCNC: 104 MMOL/L — SIGNIFICANT CHANGE UP (ref 96–108)
CK MB CFR SERPL CALC: 3.2 NG/ML — SIGNIFICANT CHANGE UP (ref 0–3.8)
CK SERPL-CCNC: 50 U/L — SIGNIFICANT CHANGE UP (ref 25–170)
CO2 BLDV-SCNC: 24 MMOL/L — SIGNIFICANT CHANGE UP (ref 22–30)
CO2 SERPL-SCNC: 21 MMOL/L — LOW (ref 22–31)
CREAT SERPL-MCNC: 1.52 MG/DL — HIGH (ref 0.5–1.3)
EOSINOPHIL # BLD AUTO: 0 K/UL — SIGNIFICANT CHANGE UP (ref 0–0.5)
EOSINOPHIL NFR BLD AUTO: 0.1 % — SIGNIFICANT CHANGE UP (ref 0–6)
GAS PNL BLDV: 140 MMOL/L — SIGNIFICANT CHANGE UP (ref 136–145)
GAS PNL BLDV: SIGNIFICANT CHANGE UP
GLUCOSE BLDV-MCNC: 139 MG/DL — HIGH (ref 70–99)
GLUCOSE SERPL-MCNC: 142 MG/DL — HIGH (ref 70–99)
HCO3 BLDV-SCNC: 23 MMOL/L — SIGNIFICANT CHANGE UP (ref 21–29)
HCT VFR BLD CALC: 31.9 % — LOW (ref 34.5–45)
HCT VFR BLDA CALC: 39 % — SIGNIFICANT CHANGE UP (ref 39–50)
HGB BLD CALC-MCNC: 12.8 G/DL — SIGNIFICANT CHANGE UP (ref 11.5–15.5)
HGB BLD-MCNC: 10.7 G/DL — LOW (ref 11.5–15.5)
INR BLD: 1.13 RATIO — SIGNIFICANT CHANGE UP (ref 0.88–1.16)
LACTATE BLDV-MCNC: 2.4 MMOL/L — HIGH (ref 0.7–2)
LYMPHOCYTES # BLD AUTO: 1.2 K/UL — SIGNIFICANT CHANGE UP (ref 1–3.3)
LYMPHOCYTES # BLD AUTO: 9.5 % — LOW (ref 13–44)
MCHC RBC-ENTMCNC: 29.1 PG — SIGNIFICANT CHANGE UP (ref 27–34)
MCHC RBC-ENTMCNC: 33.6 GM/DL — SIGNIFICANT CHANGE UP (ref 32–36)
MCV RBC AUTO: 86.7 FL — SIGNIFICANT CHANGE UP (ref 80–100)
MONOCYTES # BLD AUTO: 0.9 K/UL — SIGNIFICANT CHANGE UP (ref 0–0.9)
MONOCYTES NFR BLD AUTO: 7.3 % — SIGNIFICANT CHANGE UP (ref 2–14)
NEUTROPHILS # BLD AUTO: 10.1 K/UL — HIGH (ref 1.8–7.4)
NEUTROPHILS NFR BLD AUTO: 82.8 % — HIGH (ref 43–77)
NT-PROBNP SERPL-SCNC: HIGH PG/ML (ref 0–300)
PCO2 BLDV: 42 MMHG — SIGNIFICANT CHANGE UP (ref 35–50)
PH BLDV: 7.35 — SIGNIFICANT CHANGE UP (ref 7.35–7.45)
PLATELET # BLD AUTO: 381 K/UL — SIGNIFICANT CHANGE UP (ref 150–400)
PO2 BLDV: 31 MMHG — SIGNIFICANT CHANGE UP (ref 25–45)
POTASSIUM BLDV-SCNC: 4.7 MMOL/L — SIGNIFICANT CHANGE UP (ref 3.5–5)
POTASSIUM SERPL-MCNC: 4.7 MMOL/L — SIGNIFICANT CHANGE UP (ref 3.5–5.3)
POTASSIUM SERPL-SCNC: 4.7 MMOL/L — SIGNIFICANT CHANGE UP (ref 3.5–5.3)
PROT SERPL-MCNC: 7.4 G/DL — SIGNIFICANT CHANGE UP (ref 6–8.3)
PROTHROM AB SERPL-ACNC: 12.2 SEC — SIGNIFICANT CHANGE UP (ref 9.8–12.7)
RAPID RVP RESULT: SIGNIFICANT CHANGE UP
RBC # BLD: 3.68 M/UL — LOW (ref 3.8–5.2)
RBC # FLD: 14.8 % — HIGH (ref 10.3–14.5)
SAO2 % BLDV: 44 % — LOW (ref 67–88)
SODIUM SERPL-SCNC: 141 MMOL/L — SIGNIFICANT CHANGE UP (ref 135–145)
TROPONIN T SERPL-MCNC: 0.11 NG/ML — HIGH (ref 0–0.06)
TROPONIN T SERPL-MCNC: 0.12 NG/ML — HIGH (ref 0–0.06)
WBC # BLD: 12.1 K/UL — HIGH (ref 3.8–10.5)
WBC # FLD AUTO: 12.1 K/UL — HIGH (ref 3.8–10.5)

## 2017-11-17 PROCEDURE — 99285 EMERGENCY DEPT VISIT HI MDM: CPT | Mod: 25,GC

## 2017-11-17 PROCEDURE — 71010: CPT | Mod: 26

## 2017-11-17 PROCEDURE — 99223 1ST HOSP IP/OBS HIGH 75: CPT | Mod: GC

## 2017-11-17 PROCEDURE — 99223 1ST HOSP IP/OBS HIGH 75: CPT

## 2017-11-17 PROCEDURE — 93010 ELECTROCARDIOGRAM REPORT: CPT

## 2017-11-17 RX ORDER — CHOLECALCIFEROL (VITAMIN D3) 125 MCG
2000 CAPSULE ORAL DAILY
Qty: 0 | Refills: 0 | Status: DISCONTINUED | OUTPATIENT
Start: 2017-11-17 | End: 2017-11-27

## 2017-11-17 RX ORDER — LEVALBUTEROL 1.25 MG/.5ML
0.63 SOLUTION, CONCENTRATE RESPIRATORY (INHALATION) ONCE
Qty: 0 | Refills: 0 | Status: COMPLETED | OUTPATIENT
Start: 2017-11-17 | End: 2017-11-17

## 2017-11-17 RX ORDER — CEFTRIAXONE 500 MG/1
1 INJECTION, POWDER, FOR SOLUTION INTRAMUSCULAR; INTRAVENOUS ONCE
Qty: 0 | Refills: 0 | Status: COMPLETED | OUTPATIENT
Start: 2017-11-17 | End: 2017-11-17

## 2017-11-17 RX ORDER — LEVALBUTEROL 1.25 MG/.5ML
0.63 SOLUTION, CONCENTRATE RESPIRATORY (INHALATION) EVERY 6 HOURS
Qty: 0 | Refills: 0 | Status: DISCONTINUED | OUTPATIENT
Start: 2017-11-17 | End: 2017-11-18

## 2017-11-17 RX ORDER — METOPROLOL TARTRATE 50 MG
5 TABLET ORAL ONCE
Qty: 0 | Refills: 0 | Status: COMPLETED | OUTPATIENT
Start: 2017-11-17 | End: 2017-11-17

## 2017-11-17 RX ORDER — LEVETIRACETAM 250 MG/1
250 TABLET, FILM COATED ORAL DAILY
Qty: 0 | Refills: 0 | Status: DISCONTINUED | OUTPATIENT
Start: 2017-11-17 | End: 2017-11-27

## 2017-11-17 RX ORDER — ASPIRIN/CALCIUM CARB/MAGNESIUM 324 MG
1 TABLET ORAL
Qty: 0 | Refills: 0 | COMMUNITY

## 2017-11-17 RX ORDER — SODIUM CHLORIDE 9 MG/ML
500 INJECTION INTRAMUSCULAR; INTRAVENOUS; SUBCUTANEOUS ONCE
Qty: 0 | Refills: 0 | Status: COMPLETED | OUTPATIENT
Start: 2017-11-17 | End: 2017-11-17

## 2017-11-17 RX ORDER — BUDESONIDE, MICRONIZED 100 %
0.5 POWDER (GRAM) MISCELLANEOUS
Qty: 0 | Refills: 0 | Status: DISCONTINUED | OUTPATIENT
Start: 2017-11-17 | End: 2017-11-27

## 2017-11-17 RX ORDER — AZITHROMYCIN 500 MG/1
500 TABLET, FILM COATED ORAL EVERY 24 HOURS
Qty: 0 | Refills: 0 | Status: DISCONTINUED | OUTPATIENT
Start: 2017-11-17 | End: 2017-11-18

## 2017-11-17 RX ORDER — CEFTRIAXONE 500 MG/1
1 INJECTION, POWDER, FOR SOLUTION INTRAMUSCULAR; INTRAVENOUS EVERY 24 HOURS
Qty: 0 | Refills: 0 | Status: DISCONTINUED | OUTPATIENT
Start: 2017-11-17 | End: 2017-11-18

## 2017-11-17 RX ORDER — ATORVASTATIN CALCIUM 80 MG/1
10 TABLET, FILM COATED ORAL AT BEDTIME
Qty: 0 | Refills: 0 | Status: DISCONTINUED | OUTPATIENT
Start: 2017-11-17 | End: 2017-11-27

## 2017-11-17 RX ORDER — HEPARIN SODIUM 5000 [USP'U]/ML
5000 INJECTION INTRAVENOUS; SUBCUTANEOUS EVERY 8 HOURS
Qty: 0 | Refills: 0 | Status: DISCONTINUED | OUTPATIENT
Start: 2017-11-17 | End: 2017-11-21

## 2017-11-17 RX ORDER — ASPIRIN/CALCIUM CARB/MAGNESIUM 324 MG
81 TABLET ORAL DAILY
Qty: 0 | Refills: 0 | Status: DISCONTINUED | OUTPATIENT
Start: 2017-11-17 | End: 2017-11-21

## 2017-11-17 RX ORDER — MONTELUKAST 4 MG/1
10 TABLET, CHEWABLE ORAL DAILY
Qty: 0 | Refills: 0 | Status: DISCONTINUED | OUTPATIENT
Start: 2017-11-17 | End: 2017-11-27

## 2017-11-17 RX ADMIN — HEPARIN SODIUM 5000 UNIT(S): 5000 INJECTION INTRAVENOUS; SUBCUTANEOUS at 23:37

## 2017-11-17 RX ADMIN — ATORVASTATIN CALCIUM 10 MILLIGRAM(S): 80 TABLET, FILM COATED ORAL at 23:37

## 2017-11-17 RX ADMIN — Medication 5 MILLIGRAM(S): at 22:49

## 2017-11-17 RX ADMIN — LEVALBUTEROL 0.63 MILLIGRAM(S): 1.25 SOLUTION, CONCENTRATE RESPIRATORY (INHALATION) at 17:08

## 2017-11-17 RX ADMIN — AZITHROMYCIN 250 MILLIGRAM(S): 500 TABLET, FILM COATED ORAL at 15:46

## 2017-11-17 RX ADMIN — SODIUM CHLORIDE 1000 MILLILITER(S): 9 INJECTION INTRAMUSCULAR; INTRAVENOUS; SUBCUTANEOUS at 17:08

## 2017-11-17 RX ADMIN — CEFTRIAXONE 100 GRAM(S): 500 INJECTION, POWDER, FOR SOLUTION INTRAMUSCULAR; INTRAVENOUS at 14:21

## 2017-11-17 NOTE — H&P ADULT - PROBLEM SELECTOR PLAN 5
Patient w/ troponin 0.11 on admission, likely 2/2 shock; ACS less likely given no ischemic changes on EKG  Will check repeat troponin

## 2017-11-17 NOTE — ED PROVIDER NOTE - PROGRESS NOTE DETAILS
11/17 2:51pm: CXR concerning for right sided retrocardiac opacity, possible CAP in light of leukocytosis and history. Trop T elevated, however non ischemic changes on EKG and DIANA on labs. Will continue to trend. Lactate 11/17 2:51pm: CXR concerning for right sided retrocardiac opacity, possible CAP in light of leukocytosis and history. Trop T elevated, however non ischemic changes on EKG and DIANA on labs. Will continue to trend. Lactate 2.4. Pro BNP elevated. Improved HR now. Will avoid IV fluid resusitation for low BP given possible concurrent presence of CHF exacerbation.

## 2017-11-17 NOTE — ED ADULT NURSE NOTE - OBJECTIVE STATEMENT
88 y/o female patient presents to ED brought in by EMS with aide at the bedside complaining of shortness of breath and generalized weakness. As per EMS, patient is a "home call patient"- EMS worked with patient for 2 hours prior to arrival in ED. 20G IVL in left forearm placed by EMS. Patient given Combivent, 250cc NS bolus and 60mg PO Cardizem by EMS. Patient A&Ox2 (self and place), at baseline per aide. PMHX afib (not on AC) CVA, dementia, epilepsy, fall, HTN, pulmonary disease

## 2017-11-17 NOTE — ED PROVIDER NOTE - OBJECTIVE STATEMENT
86 F with PMH of Afib (not on A/C), dementia, HTN, epilepsy (on Keppra) BIBEMS for shortness of breath and generalized malaise. Of note, pt with advanced dementia (AAOx 1) at baseline, history obtained from EMS and pt's HHA present at bedside. Pt had been exhibiting increased weakness and generalized malaise over the past week and developed a dry cough. Today, HHA noticed pt was wheezing thus EMS was called to evaluate. On arrival, EMS noted pt to be in Afib w/ RVR (HR 150s), SBP in the 100s, and hypoxic to 88% on RA.  O2 sat improved on 6L NC. On exam, pt noted to have bibasilar rhonchi and faint wheezing. Received combivent tx at home and was given her home Cardizem 60mg PO.  Received 250 cc bolus NS at home by EMS. Denies any fevers/ chills at home, denies any CP/ palpitations, no headaches, neck pain/stiffness, no recent falls, denies any lower extremity swelling, no orthopnea, no increased thirst or urination. HHA reports pt not ambulating for the past few days; normally walks unassisted. Also noticed decreased appetite over the past few days. Of note, pt last hospitalized 1/2017 for mechanical fall at home. NO sick contacts or recent travel.  Code status: DNR/DNI. MOLST in chart.

## 2017-11-17 NOTE — ED PROVIDER NOTE - ATTENDING CONTRIBUTION TO CARE
I have seen and evaluated this patient with the resident.   I agree with the findings  unless other wise stated.  I have made appropriate changes in documentations where needed, After my face to face bedside evaluation, I am further  noting: Pt with dementia has wheezing cough and Oxygen desaturation PNA on xrays will admit for iv antibiotics --Soto

## 2017-11-17 NOTE — ED PROVIDER NOTE - PSYCHIATRIC, MLM
Alert and oriented to person, not place or time/situation. normal mood and affect. no apparent risk to self or others.

## 2017-11-17 NOTE — H&P ADULT - NSHPLABSRESULTS_GEN_ALL_CORE
EKG Date/Time: 17-Nov-2017 13:26  · Rate: 123  · Interpretation: Afib  · Axis: Normal  · QRS: 76  · ST/T Wave: No ischemic ST/ T wave abnormalities  · Prior EKG Status: the EKG is unchanged from prior EKG

## 2017-11-17 NOTE — H&P ADULT - PROBLEM SELECTOR PLAN 6
Patient w/ SOB x 1 wk, found to be hypoxic and tachypneic   NC to maintain O2 sat > 90%; Will wean as tolerated  Will resume home meds montelukast, budesonide, xopenex

## 2017-11-17 NOTE — H&P ADULT - PROBLEM SELECTOR PLAN 2
Patient w/ hx of afib (not on AC), found to be in afib w/ RVR w/ HR up to 150s on assessment by EMS  Will c/w home med cardizem for now  Pt has private cardiologist who is planning to see pt tomorrow; In-house CCU consulted today, awaiting recommendations

## 2017-11-17 NOTE — ED ADULT NURSE NOTE - PMH
CVA (cerebral vascular accident)    Epilepsy    Fall    HTN (hypertension)    Pulmonary disease Atrial fibrillation    CVA (cerebral vascular accident)    Dementia    Epilepsy    Fall    HTN (hypertension)    Pulmonary disease

## 2017-11-17 NOTE — H&P ADULT - ASSESSMENT
Pt is an 88 yo F w/ hx of afib (not on AC), CVA, advanced dementia (AAOx1 baseline), epilepsy (on keppra), fall, HTN, and pulmonary disease who presents w/ generalized weakness, malaise, and SOB x 1 week, found to meet SIRS criteria possibly 2/2 PNA, and w/ signs of overload on physical exam and CXR concerning for acute HF exacerbation. Pt is an 86 yo F w/ hx of afib (not on AC), CVA, advanced dementia (AAOx1 baseline), epilepsy (on keppra), fall, HTN, and pulmonary disease who presents w/ generalized weakness, malaise, and SOB x 1 week, found to meet SIRS criteria possibly 2/2 PNA, in afib w/ RVR, and w/ signs of overload on physical exam and CXR concerning for acute HF exacerbation.

## 2017-11-17 NOTE — H&P ADULT - HISTORY OF PRESENT ILLNESS
Pt is an 86 yo F w/ hx of afib (not on AC), CVA, advanced dementia (AAOx1 baseline), epilepsy (on keppra), fall, HTN, and pulmonary disease who presents w/ generalized weakness, malaise, and SOB x 1 week. Pt was in her usual state of health until 1 wk PTA when she was noted by her HHA and daughter to have fluctuating energy levels and reduced appetite. Pt also developed a dry cough and began wheezing on the day of admission. Pt also was complaining of back pain and achiness. EMS was called in to patient's home. EMS found pt in Afib w/ RVR (HR 150s), hypotensive w/ SBP in the 100s, and hypoxic w/ SpO2 88% on RA. 6L NC was started with subsequent improvement in SpO2. On exam, pt was reported to have bibasilar rhonchi and faint wheezing. Pt was given combivent tx at home as well as her home med Cardizem 60mg PO. A 250 cc bolus of NS was provided by EMS at home. Per HHA, pt has not been ambulating for the past few days but typically walks unassisted. Also noticed decreased appetite over the past few days. Pt was last hospitalized in 1/2017 for a mechanical fall at home. Of note, pt is DNR/DNI. MOLST in chart.    In the ED, initial VS were /66, , RR 22, T 98.3, SpO2 97%. Labs were notable for WBC 12.1 w/ 82.8% neutrophils, hgb 10.7 (baseline), Cr 1.52, AST 47, trop 0.11, lactate 2.4, and pro-BNP 34645. BCx x 2 were drawn. EKG showed afib w/  and no ischemic changes. CXR demonstrated bilateral patchy opacities c/w pulmonary edema and b/l pleural effusions (R > L) w/ associated atelectasis. Given CTX x 1, azithromycin x 1, and cardizem 60 x 1.     Pt is poor historian but, at time of encounter, denied fever, chills, headache, changes in vision, congestion, sore throat, cough, chest pain, palpitations, shortness of breath, abdominal pain, nausea, diarrhea, constipation, dysuria, hematuria, numbness, tingling, and muscle weakness. No sick contacts or recent travel.

## 2017-11-17 NOTE — ED PROVIDER NOTE - MEDICAL DECISION MAKING DETAILS
Passi, PGY-3: CBC, CMP, VBG w/ lytes, CE, pro-BNP, EKG, CXR. Monitor HR for now, may require rate controlling medication. Now on 2L NC. Passi, PGY-3: CBC, CMP, VBG w/ lytes, CE, pro-BNP, EKG, CXR. Monitor HR for now, may require rate controlling medication. Now on 2L NC. Reviewed recent TTE (1/2017); revealed hyperdynamic LV w/ EF 75%, mild-mod AS , severe AR , no segmental wall abnormalities

## 2017-11-17 NOTE — H&P ADULT - PMH
Atrial fibrillation    CVA (cerebral vascular accident)    Dementia    Epilepsy    Fall    HTN (hypertension)    Pulmonary disease

## 2017-11-17 NOTE — H&P ADULT - NSHPPHYSICALEXAM_GEN_ALL_CORE
Vital Signs Last 24 Hrs  T(C): 36.6 (17 Nov 2017 12:55), Max: 36.8 (17 Nov 2017 12:35)  T(F): 97.9 (17 Nov 2017 12:55), Max: 98.3 (17 Nov 2017 12:35)  HR: 117 (17 Nov 2017 15:40) (117 - 133)  BP: 115/87 (17 Nov 2017 15:40) (101/76 - 115/87)  BP(mean): --  RR: 18 (17 Nov 2017 15:40) (18 - 22)  SpO2: 96% (17 Nov 2017 15:40) (95% - 97%)

## 2017-11-17 NOTE — H&P ADULT - PROBLEM SELECTOR PLAN 3
Patient p/w SOB x 1 week, w/ signs of overload on physical exam and CXR concerning for acute HF exacerbation  Holding diuresis for now in light of hypotension  TTE from 1/2017 showing hyperdynamic LV, EF 75%, mild-moderate AS, severe AR, no segmental wall motion abnormality  Will obtain TTE  Pt has private cardiologist who is planning to see pt tomorrow; In-house CCU consulted today, awaiting recommendations

## 2017-11-17 NOTE — CHART NOTE - NSCHARTNOTEFT_GEN_A_CORE
Called for sustained a fib with RVR > 140s, which was concerning per ED for transport to floors. Per ED RN, patient appeared to have increased WOB due to "grunting," which she had not had before. Patient assessed by myself and MAR at bedside. Patient denied any SOB, CP, palpitations but endorsed being "uncomfortable everywhere." On monitor, HR persistently in 140s, saturating around 95% on 4L NC, BP with systolic in 140s. On exam, irregularly irregular rhythm with systolic murmurs. Mild wheezing heard posteriorly with some crackles at bases of lungs. No peripheral edema or JVD appreciated. Lopressor 5 IVP given with HR initially responding down to 80s for several minutes. However, HR increased again and fluctuated between 90-120s with systolic BP ranging between 90-120s. Patient deemed stable for transport up to floors. Will follow up.     Tre Ceja MD   PGY1  NF  Pager: [phone] Called for sustained a fib with RVR > 140s, which was concerning per ED for transport to floors. Per ED RN, patient appeared to have increased WOB due to "grunting," which she had not had before. Patient assessed by myself and MAR at bedside. Patient denied any SOB, CP, palpitations but endorsed being "uncomfortable everywhere." On monitor, HR persistently in 140s, saturating around 95% on 4L NC, BP with systolic in 140s. On exam, irregularly irregular rhythm with systolic murmurs. Mild wheezing heard posteriorly with some crackles at bases of lungs. No peripheral edema or JVD appreciated. Lopressor 5 IVP given with HR initially responding down to 80s for several minutes. However, HR increased again and fluctuated between 90-120s with systolic BP ranging between 90-120s. Patient deemed stable for transport up to floors. Will follow up.     Update: patient converted to sinus (around 70s) since 1:30AM.     Tre Ceja MD   PGY1  NF  Pager: [phone] Called for sustained a fib with RVR > 140s, which was concerning per ED for transport to floors. Per ED RN, patient appeared to have increased WOB due to "grunting," which she had not had before. Patient assessed by myself and MAR at bedside. Patient denied any SOB, CP, palpitations but endorsed being "uncomfortable everywhere." On monitor, HR persistently in 140s, saturating around 95% on 4L NC, BP with systolic in 140s. On exam, irregularly irregular rhythm with systolic murmurs. Mild wheezing heard posteriorly with some crackles at bases of lungs. No peripheral edema or JVD appreciated. Lopressor 5 IVP given with HR initially responding down to 80s for several minutes. However, HR increased again and fluctuated between 90-120s with systolic BP ranging between 90-120s. Patient deemed stable for transport up to floors. Will follow up.     Update: patient converted to sinus (around 70s) since 1:30AM. During the time of conversion, patient with 4 second pause recorded on tele.     Tre Ceja MD   PGY1  NF  Pager: [phone] Called for sustained a fib with RVR > 140s, which was concerning per ED for transport to floors. Per ED RN, patient appeared to have increased WOB due to "grunting," which she had not had before. Patient assessed by myself and MAR at bedside. Patient denied any SOB, CP, palpitations but endorsed being "uncomfortable everywhere." On monitor, HR persistently in 140s, saturating around 95% on 4L NC, BP with systolic in 140s. On exam, irregularly irregular rhythm with systolic murmurs. Mild wheezing heard posteriorly with some crackles at bases of lungs. No peripheral edema or JVD appreciated. Lopressor 5 IVP given with HR initially responding down to 80s for several minutes. However, HR increased again and fluctuated between 90-120s with systolic BP ranging between 90-120s. Patient deemed stable for transport up to floors. Will follow up.     Update: patient converted to sinus (around 70s) since 1:30AM. During the time of conversion, patient with 4 second pause recorded on tele. Cardiac enzymes stable (0.11 --> 0.12) but elevated likely in setting of demand ischemia. Per cardiology recs, patient given 325mg Aspirin x1 (and to follow up with 81mg daily). Cardizem dosing switched from 60 BID to 60 q6 for better rate control, also per cardiology recs.     Tre Ceja MD   PGY1  NF  Pager: [phone] Called for sustained a fib with RVR > 140s, which was concerning per ED for transport to floors. Per ED RN, patient appeared to have increased WOB due to "grunting," which she had not had before. Patient assessed by myself and MAR at bedside. Patient denied any SOB, CP, palpitations but endorsed being "uncomfortable everywhere." On monitor, HR persistently in 140s, saturating around 95% on 4L NC, BP with systolic in 140s. On exam, irregularly irregular rhythm with systolic murmurs. Mild wheezing heard posteriorly with some crackles at bases of lungs. No peripheral edema or JVD appreciated. Lopressor 5 IVP given with HR initially responding down to 80s for several minutes. However, HR increased again and fluctuated between 90-120s with systolic BP ranging between 90-120s. Patient deemed stable for transport up to floors. Will follow up.     Update: patient converted to sinus (around 70s) since 1:30AM. During the time of conversion, patient with 4 second pause recorded on tele. Cardiac enzymes stable (0.11 --> 0.12) but elevated likely in setting of demand ischemia. Per cardiology recs, patient given 325mg Aspirin x1 (and to follow up with 81mg daily). Cardizem dosing switched from 60 BID to 60 q6 for better rate control, also per cardiology recs. lasix 20mg IV given per pharmacy recs as patient continues to be hypoxic on RA in setting of HF and pleural effusions.    Patient became agitated early morning, taking her nasal cannula off, desaturating to high 70s on RA. Tried redirecting and haldol (total of 1.5mg) without success. To avoid prolonged hypoxia, wrist restraints ordered and patient placed back on NC (saturating at 95% on 6L).       Tre Ceja MD   PGY1  NF  Pager: [phone] Called for sustained a fib with RVR > 140s, which was concerning per ED for transport to floors. Per ED RN, patient appeared to have increased WOB due to "grunting," which she had not had before. Patient assessed by myself and MAR at bedside. Patient denied any SOB, CP, palpitations but endorsed being "uncomfortable everywhere." On monitor, HR persistently in 140s, saturating around 95% on 4L NC, BP with systolic in 140s. On exam, irregularly irregular rhythm with systolic murmurs. Mild wheezing heard posteriorly with some crackles at bases of lungs. No peripheral edema or JVD appreciated. Lopressor 5 IVP given with HR initially responding down to 80s for several minutes. However, HR increased again and fluctuated between 90-120s with systolic BP ranging between 90-120s. Patient deemed stable for transport up to floors. Will follow up.     Update: patient converted to sinus (around 70s) since 1:30AM. During the time of conversion, patient with 4 second pause recorded on tele. Cardiac enzymes stable (0.11 --> 0.12) but elevated likely in setting of demand ischemia. Per cardiology recs, patient given 325mg Aspirin x1 (and to follow up with 81mg daily). Cardizem dosing switched from 60 BID to 60 q6 for better rate control, also per cardiology recs. lasix 20mg IV given per pharmacy recs as patient continues to be hypoxic on RA in setting of HF and pleural effusions.    Patient became agitated early morning, taking her nasal cannula off, desaturating to high 70s on RA. Tried enhanced supervision, redirecting, and haldol (total of 1.5mg) without success. To avoid prolonged hypoxia, wrist restraints ordered and patient placed back on NC (saturating at 95% on 6L).       Tre Ceja MD   PGY1  NF  Pager: [phone] Called for sustained a fib with RVR > 140s, which was concerning per ED for transport to floors. Per ED RN, patient appeared to have increased WOB due to "grunting," which she had not had before. Patient assessed by myself and MAR at bedside. Patient denied any SOB, CP, palpitations but endorsed being "uncomfortable everywhere." On monitor, HR persistently in 140s, saturating around 95% on 4L NC, BP with systolic in 140s. On exam, irregularly irregular rhythm with systolic murmurs. Mild wheezing heard posteriorly with some crackles at bases of lungs. No peripheral edema or JVD appreciated. Lopressor 5 IVP given with HR initially responding down to 80s for several minutes. However, HR increased again and fluctuated between 90-120s with systolic BP ranging between 90-120s. Patient deemed stable for transport up to floors. Will follow up.     Update: patient converted to sinus (around 70s) since 1:30AM. During the time of conversion, patient with 4 second pause recorded on tele. Cardiac enzymes stable (0.11 --> 0.12) but elevated likely in setting of demand ischemia. Per cardiology recs, patient given 325mg Aspirin x1 (and to follow up with 81mg daily). Cardizem dosing switched from 60 BID to 60 q6 for better rate control, also per cardiology recs. lasix 20mg IV given per pharmacy recs as patient continues to be hypoxic on RA in setting of HF and pleural effusions.    Patient became agitated early morning, taking her nasal cannula off, desaturating to high 70s on RA. Tried enhanced supervision, redirecting, and haldol (total of 1.5mg) without success. To avoid prolonged hypoxia, wrist restraints ordered and patient placed back on NC (saturating at 95% on 6L). Patient remained in sinus 70s.     Tre Ceja MD   PGY1  NF  Pager: [phone] Called for sustained a fib with RVR > 140s, which was concerning per ED for transport to floors. Per ED RN, patient appeared to have increased WOB due to "grunting," which she had not had before. Patient assessed by myself and MAR at bedside. Patient denied any SOB, CP, palpitations but endorsed being "uncomfortable everywhere." On monitor, HR persistently in 140s, saturating around 95% on 4L NC, BP with systolic in 140s. On exam, irregularly irregular rhythm with systolic murmurs. Mild wheezing heard posteriorly with some crackles at bases of lungs. No peripheral edema or JVD appreciated. Lopressor 5 IVP given with HR initially responding down to 80s for several minutes. However, HR increased again and fluctuated between 90-120s with systolic BP ranging between 90-120s. Patient deemed stable for transport up to floors. Will follow up.     Update: patient converted to sinus (around 70s) since 1:30AM. During the time of conversion, patient with 4 second pause recorded on tele. Cardiac enzymes stable (0.11 --> 0.12) but elevated likely in setting of demand ischemia. Per cardiology recs, patient given 325mg Aspirin x1 (and to follow up with 81mg daily). Cardizem dosing switched from 60 BID to 60 q6 for better rate control, also per cardiology recs. lasix 20mg IV given as patient continues to be hypoxic on RA in setting of HF and pleural effusions.    Patient became agitated early morning, taking her nasal cannula off, desaturating to high 70s on RA. Tried enhanced supervision, redirecting, and haldol (total of 1.5mg) without success. To avoid prolonged hypoxia, wrist restraints ordered and patient placed back on NC (saturating at 95% on 6L). Patient remained in sinus 70s.     Tre Ceja MD   PGY1  NF  Pager: [phone]

## 2017-11-17 NOTE — H&P ADULT - PROBLEM SELECTOR PLAN 1
Patient met SIRS criteria on admission w/ hypotension, tachypnea, leukocytosis w/ neutrophilic predominance, and elevated lactate; Possibly 2/2 PNA as CXR w/ b/l patchy opacities   S/p 250 cc NS by EMS and additional 500 cc NS in ED; Will not aggressively resuscitate w/ IVF for now due to concern for acute HF exacerbation  Given CTX x 1 and azithromycin x 1 in ED; Will c/w CTX and azithromycin for now  F/u BCx, UCx, UA, and sputum cx  Monitor VS  Trend CBC

## 2017-11-17 NOTE — H&P ADULT - PROBLEM SELECTOR PLAN 4
Patient w/ SCr 1.52 on admission (baseline SCr ~1.1); Likely 2/2 shock  S/p 750 cc NS  Trend SCr  Renally dose medications  Avoid nephrotoxins Patient w/ SCr 1.52 on admission (baseline SCr ~1.1); Likely 2/2 shock  S/p 750 cc NS; Will not aggressively hydrate w/ IVF for now due to concern for acute HF exacerbation  Trend SCr  Renally dose medications  Avoid nephrotoxins

## 2017-11-18 DIAGNOSIS — I50.9 HEART FAILURE, UNSPECIFIED: ICD-10-CM

## 2017-11-18 DIAGNOSIS — R06.02 SHORTNESS OF BREATH: ICD-10-CM

## 2017-11-18 DIAGNOSIS — G47.33 OBSTRUCTIVE SLEEP APNEA (ADULT) (PEDIATRIC): ICD-10-CM

## 2017-11-18 DIAGNOSIS — J44.9 CHRONIC OBSTRUCTIVE PULMONARY DISEASE, UNSPECIFIED: ICD-10-CM

## 2017-11-18 LAB
ALBUMIN SERPL ELPH-MCNC: 4 G/DL — SIGNIFICANT CHANGE UP (ref 3.3–5)
ALP SERPL-CCNC: 96 U/L — SIGNIFICANT CHANGE UP (ref 40–120)
ALT FLD-CCNC: 184 U/L RC — HIGH (ref 10–45)
ANION GAP SERPL CALC-SCNC: 16 MMOL/L — SIGNIFICANT CHANGE UP (ref 5–17)
ANION GAP SERPL CALC-SCNC: 20 MMOL/L — HIGH (ref 5–17)
AST SERPL-CCNC: 193 U/L — HIGH (ref 10–40)
BASOPHILS # BLD AUTO: 0 K/UL — SIGNIFICANT CHANGE UP (ref 0–0.2)
BASOPHILS NFR BLD AUTO: 0.3 % — SIGNIFICANT CHANGE UP (ref 0–2)
BILIRUB SERPL-MCNC: 0.4 MG/DL — SIGNIFICANT CHANGE UP (ref 0.2–1.2)
BUN SERPL-MCNC: 59 MG/DL — HIGH (ref 7–23)
BUN SERPL-MCNC: 63 MG/DL — HIGH (ref 7–23)
CALCIUM SERPL-MCNC: 9.2 MG/DL — SIGNIFICANT CHANGE UP (ref 8.4–10.5)
CALCIUM SERPL-MCNC: 9.4 MG/DL — SIGNIFICANT CHANGE UP (ref 8.4–10.5)
CHLORIDE SERPL-SCNC: 102 MMOL/L — SIGNIFICANT CHANGE UP (ref 96–108)
CHLORIDE SERPL-SCNC: 105 MMOL/L — SIGNIFICANT CHANGE UP (ref 96–108)
CO2 SERPL-SCNC: 19 MMOL/L — LOW (ref 22–31)
CO2 SERPL-SCNC: 21 MMOL/L — LOW (ref 22–31)
CREAT SERPL-MCNC: 1.89 MG/DL — HIGH (ref 0.5–1.3)
CREAT SERPL-MCNC: 1.91 MG/DL — HIGH (ref 0.5–1.3)
EOSINOPHIL # BLD AUTO: 0 K/UL — SIGNIFICANT CHANGE UP (ref 0–0.5)
EOSINOPHIL NFR BLD AUTO: 0.1 % — SIGNIFICANT CHANGE UP (ref 0–6)
GLUCOSE SERPL-MCNC: 116 MG/DL — HIGH (ref 70–99)
GLUCOSE SERPL-MCNC: 150 MG/DL — HIGH (ref 70–99)
HCT VFR BLD CALC: 35.3 % — SIGNIFICANT CHANGE UP (ref 34.5–45)
HGB BLD-MCNC: 11.5 G/DL — SIGNIFICANT CHANGE UP (ref 11.5–15.5)
LACTATE SERPL-SCNC: 1.4 MMOL/L — SIGNIFICANT CHANGE UP (ref 0.7–2)
LYMPHOCYTES # BLD AUTO: 1.1 K/UL — SIGNIFICANT CHANGE UP (ref 1–3.3)
LYMPHOCYTES # BLD AUTO: 8.8 % — LOW (ref 13–44)
MAGNESIUM SERPL-MCNC: 2.6 MG/DL — SIGNIFICANT CHANGE UP (ref 1.6–2.6)
MCHC RBC-ENTMCNC: 28.2 PG — SIGNIFICANT CHANGE UP (ref 27–34)
MCHC RBC-ENTMCNC: 32.4 GM/DL — SIGNIFICANT CHANGE UP (ref 32–36)
MCV RBC AUTO: 87.1 FL — SIGNIFICANT CHANGE UP (ref 80–100)
MONOCYTES # BLD AUTO: 1 K/UL — HIGH (ref 0–0.9)
MONOCYTES NFR BLD AUTO: 7.8 % — SIGNIFICANT CHANGE UP (ref 2–14)
NEUTROPHILS # BLD AUTO: 10.7 K/UL — HIGH (ref 1.8–7.4)
NEUTROPHILS NFR BLD AUTO: 83.1 % — HIGH (ref 43–77)
PHOSPHATE SERPL-MCNC: 5.9 MG/DL — HIGH (ref 2.5–4.5)
PHOSPHATE SERPL-MCNC: 6 MG/DL — HIGH (ref 2.5–4.5)
PLATELET # BLD AUTO: 392 K/UL — SIGNIFICANT CHANGE UP (ref 150–400)
POTASSIUM SERPL-MCNC: 5.1 MMOL/L — SIGNIFICANT CHANGE UP (ref 3.5–5.3)
POTASSIUM SERPL-MCNC: 5.4 MMOL/L — HIGH (ref 3.5–5.3)
POTASSIUM SERPL-SCNC: 5.1 MMOL/L — SIGNIFICANT CHANGE UP (ref 3.5–5.3)
POTASSIUM SERPL-SCNC: 5.4 MMOL/L — HIGH (ref 3.5–5.3)
PROT SERPL-MCNC: 7.7 G/DL — SIGNIFICANT CHANGE UP (ref 6–8.3)
RBC # BLD: 4.06 M/UL — SIGNIFICANT CHANGE UP (ref 3.8–5.2)
RBC # FLD: 14.8 % — HIGH (ref 10.3–14.5)
SODIUM SERPL-SCNC: 141 MMOL/L — SIGNIFICANT CHANGE UP (ref 135–145)
SODIUM SERPL-SCNC: 142 MMOL/L — SIGNIFICANT CHANGE UP (ref 135–145)
WBC # BLD: 12.8 K/UL — HIGH (ref 3.8–10.5)
WBC # FLD AUTO: 12.8 K/UL — HIGH (ref 3.8–10.5)

## 2017-11-18 PROCEDURE — 99233 SBSQ HOSP IP/OBS HIGH 50: CPT

## 2017-11-18 PROCEDURE — 93010 ELECTROCARDIOGRAM REPORT: CPT

## 2017-11-18 PROCEDURE — 99233 SBSQ HOSP IP/OBS HIGH 50: CPT | Mod: GC

## 2017-11-18 RX ORDER — ASPIRIN/CALCIUM CARB/MAGNESIUM 324 MG
325 TABLET ORAL ONCE
Qty: 0 | Refills: 0 | Status: COMPLETED | OUTPATIENT
Start: 2017-11-18 | End: 2017-11-18

## 2017-11-18 RX ORDER — INFLUENZA VIRUS VACCINE 15; 15; 15; 15 UG/.5ML; UG/.5ML; UG/.5ML; UG/.5ML
0.5 SUSPENSION INTRAMUSCULAR ONCE
Qty: 0 | Refills: 0 | Status: DISCONTINUED | OUTPATIENT
Start: 2017-11-18 | End: 2017-11-27

## 2017-11-18 RX ORDER — HALOPERIDOL DECANOATE 100 MG/ML
1 INJECTION INTRAMUSCULAR ONCE
Qty: 0 | Refills: 0 | Status: DISCONTINUED | OUTPATIENT
Start: 2017-11-18 | End: 2017-11-18

## 2017-11-18 RX ORDER — HALOPERIDOL DECANOATE 100 MG/ML
0.5 INJECTION INTRAMUSCULAR ONCE
Qty: 0 | Refills: 0 | Status: COMPLETED | OUTPATIENT
Start: 2017-11-18 | End: 2017-11-18

## 2017-11-18 RX ORDER — HALOPERIDOL DECANOATE 100 MG/ML
1 INJECTION INTRAMUSCULAR ONCE
Qty: 0 | Refills: 0 | Status: COMPLETED | OUTPATIENT
Start: 2017-11-18 | End: 2017-11-18

## 2017-11-18 RX ORDER — FUROSEMIDE 40 MG
20 TABLET ORAL ONCE
Qty: 0 | Refills: 0 | Status: COMPLETED | OUTPATIENT
Start: 2017-11-18 | End: 2017-11-18

## 2017-11-18 RX ORDER — TIOTROPIUM BROMIDE 18 UG/1
1 CAPSULE ORAL; RESPIRATORY (INHALATION) DAILY
Qty: 0 | Refills: 0 | Status: DISCONTINUED | OUTPATIENT
Start: 2017-11-18 | End: 2017-11-27

## 2017-11-18 RX ORDER — LEVALBUTEROL 1.25 MG/.5ML
0.63 SOLUTION, CONCENTRATE RESPIRATORY (INHALATION) EVERY 6 HOURS
Qty: 0 | Refills: 0 | Status: DISCONTINUED | OUTPATIENT
Start: 2017-11-18 | End: 2017-11-27

## 2017-11-18 RX ORDER — PANTOPRAZOLE SODIUM 20 MG/1
40 TABLET, DELAYED RELEASE ORAL
Qty: 0 | Refills: 0 | Status: DISCONTINUED | OUTPATIENT
Start: 2017-11-18 | End: 2017-11-27

## 2017-11-18 RX ADMIN — LEVALBUTEROL 0.63 MILLIGRAM(S): 1.25 SOLUTION, CONCENTRATE RESPIRATORY (INHALATION) at 09:34

## 2017-11-18 RX ADMIN — HEPARIN SODIUM 5000 UNIT(S): 5000 INJECTION INTRAVENOUS; SUBCUTANEOUS at 21:48

## 2017-11-18 RX ADMIN — Medication 81 MILLIGRAM(S): at 11:32

## 2017-11-18 RX ADMIN — Medication 2000 UNIT(S): at 11:32

## 2017-11-18 RX ADMIN — HEPARIN SODIUM 5000 UNIT(S): 5000 INJECTION INTRAVENOUS; SUBCUTANEOUS at 13:26

## 2017-11-18 RX ADMIN — TIOTROPIUM BROMIDE 1 CAPSULE(S): 18 CAPSULE ORAL; RESPIRATORY (INHALATION) at 11:33

## 2017-11-18 RX ADMIN — Medication 0.5 MILLIGRAM(S): at 17:04

## 2017-11-18 RX ADMIN — Medication 325 MILLIGRAM(S): at 03:18

## 2017-11-18 RX ADMIN — LEVALBUTEROL 0.63 MILLIGRAM(S): 1.25 SOLUTION, CONCENTRATE RESPIRATORY (INHALATION) at 23:07

## 2017-11-18 RX ADMIN — ATORVASTATIN CALCIUM 10 MILLIGRAM(S): 80 TABLET, FILM COATED ORAL at 21:48

## 2017-11-18 RX ADMIN — HEPARIN SODIUM 5000 UNIT(S): 5000 INJECTION INTRAVENOUS; SUBCUTANEOUS at 05:38

## 2017-11-18 RX ADMIN — MONTELUKAST 10 MILLIGRAM(S): 4 TABLET, CHEWABLE ORAL at 11:33

## 2017-11-18 RX ADMIN — HALOPERIDOL DECANOATE 1 MILLIGRAM(S): 100 INJECTION INTRAMUSCULAR at 04:52

## 2017-11-18 RX ADMIN — Medication 20 MILLIGRAM(S): at 05:38

## 2017-11-18 RX ADMIN — PANTOPRAZOLE SODIUM 40 MILLIGRAM(S): 20 TABLET, DELAYED RELEASE ORAL at 09:34

## 2017-11-18 RX ADMIN — HALOPERIDOL DECANOATE 0.5 MILLIGRAM(S): 100 INJECTION INTRAMUSCULAR at 03:57

## 2017-11-18 RX ADMIN — HALOPERIDOL DECANOATE 1 MILLIGRAM(S): 100 INJECTION INTRAMUSCULAR at 04:39

## 2017-11-18 RX ADMIN — Medication 0.5 MILLIGRAM(S): at 05:38

## 2017-11-18 RX ADMIN — LEVETIRACETAM 250 MILLIGRAM(S): 250 TABLET, FILM COATED ORAL at 11:32

## 2017-11-18 RX ADMIN — LEVALBUTEROL 0.63 MILLIGRAM(S): 1.25 SOLUTION, CONCENTRATE RESPIRATORY (INHALATION) at 17:23

## 2017-11-18 NOTE — PROGRESS NOTE ADULT - PROBLEM SELECTOR PLAN 3
Patient p/w SOB x 1 week w/ signs of overload on physical exam, elevated pro-BNP, and CXR concerning for acute HF exacerbation  Given lasix 20 IV x 1 per cards recs  Strict I+Os for goal net negative 500cc to 1L daily per cards recs; Will diurese as needed to meet goal  TTE from 1/2017 showing hyperdynamic LV, EF 75%, mild-moderate AS, severe AR, no segmental wall motion abnormality  Will obtain TTE  Pt's private cardiologist, Dr. Macias, to see pt today Patient p/w SOB and met SIRS criteria on admission w/ hypotension, tachypnea, leukocytosis w/ neutrophilic predominance, and elevated lactate  S/p 250 cc NS by EMS and additional 500 cc NS in ED; Will not aggressively resuscitate w/ IVF in setting of likely acute HF exacerbation  S/p CTX and azithromycin in ED; Will d/c abx as pulm suspects HF and not PNA is etiology of pt's SOB  RVP negative; F/u BCx, UCx, UA, and sputum cx  Will obtain repeat CXR on 11/19 per pulm  Monitor VS  Trend CBC

## 2017-11-18 NOTE — CONSULT NOTE ADULT - SUBJECTIVE AND OBJECTIVE BOX
HPI:    PAST MEDICAL & SURGICAL HISTORY:  Atrial fibrillation  Dementia  Pulmonary disease  Fall  CVA (cerebral vascular accident)  HTN (hypertension)  Epilepsy  S/P Hysterectomy      FAMILY HISTORY:  No pertinent family history in first degree relatives      SOCIAL HISTORY:  Smoking: __1 packs x 20___ years  EtOH Use:n	  Marital Status:w  Occupation:r  Exposures:n  Recent Travel:n    Allergies    No Known Allergies    Intolerances        HOME MEDICATIONS:atorvastatin, diltiazem, xopenex, budesinide, d3    REVIEW OF SYSTEMS:  Constitutional: No fevers or chills. No weight loss. No fatigue or generalised malaise.  Eyes: No itching or discharge from the eyes  ENT: No ear pain. No ear discharge. No nasal congestion. No post nasal drip. No epistaxis. No throat pain. No sore throat. No difficulty swallowing.   CV: No chest pain. No palpitations. No lightheadedness or dizziness.   Resp: No dyspnea at rest. No dyspnea on exertion. No orthopnea. No wheezing. No cough. No stridor. No sputum production. No chest pain with respiration.  GI: No nausea. No vomiting. No diarrhea.  MSK: No joint pain or pain in any extremities  Integumentary: No skin lesions. No pedal edema.  Neurological: No gross motor weakness. No sensory changes.    [ ] All other systems negative  [ ] Unable to assess ROS because ________    OBJECTIVE:  ICU Vital Signs Last 24 Hrs  T(C): 36.4 (18 Nov 2017 05:10), Max: 36.8 (17 Nov 2017 12:35)  T(F): 97.5 (18 Nov 2017 05:10), Max: 98.3 (17 Nov 2017 12:35)  HR: 72 (18 Nov 2017 05:35) (55 - 140)  BP: 129/77 (18 Nov 2017 05:10) (92/72 - 151/97)  BP(mean): --  ABP: --  ABP(mean): --  RR: 20 (18 Nov 2017 05:10) (18 - 30)  SpO2: 96% (18 Nov 2017 05:10) (92% - 97%)        CAPILLARY BLOOD GLUCOSE          PHYSICAL EXAM:  General: Awake, alert, oriented X 3.   HEENT: Atraumatic, normocephalic.                 Mallampatti Grade                 No nasal congestion.                No tonsillar or pharyngeal exudates.  Lymph Nodes: No palpable lymphadenopathy  Neck: No JVD. No carotid bruit.   Respiratory: Normal chest expansion                         Normal percussion                         Normal and equal air entry                         No wheeze, rhonchi or rales.  Cardiovascular: S1 S2 normal. No murmurs, rubs or gallops.   Abdomen: Soft, non-tender, non-distended. No organomegaly.  Extremities: Warm to touch. Peripheral pulse palpable. No pedal edema.   Skin: No rashes or skin lesions  Neurological: Motor and sensory examination equal and normal in all four extremities.  Psychiatry: Appropriate mood and affect.    HOSPITAL MEDICATIONS:  MEDICATIONS  (STANDING):  aspirin enteric coated 81 milliGRAM(s) Oral daily  atorvastatin 10 milliGRAM(s) Oral at bedtime  azithromycin  IVPB 500 milliGRAM(s) IV Intermittent every 24 hours  buDESOnide   0.5 milliGRAM(s) Respule 0.5 milliGRAM(s) Inhalation two times a day  cefTRIAXone   IVPB 1 Gram(s) IV Intermittent every 24 hours  cholecalciferol 2000 Unit(s) Oral daily  diltiazem    Tablet 60 milliGRAM(s) Oral every 6 hours  heparin  Injectable 5000 Unit(s) SubCutaneous every 8 hours  influenza   Vaccine 0.5 milliLiter(s) IntraMuscular once  levETIRAcetam 250 milliGRAM(s) Oral daily  montelukast 10 milliGRAM(s) Oral daily    MEDICATIONS  (PRN):  levalbuterol Inhalation 0.63 milliGRAM(s) Inhalation every 6 hours PRN shortness of breath / wheezing      LABS:                        10.7   12.1  )-----------( 381      ( 17 Nov 2017 13:08 )             31.9     11-17    141  |  104  |  52<H>  ----------------------------<  142<H>  4.7   |  21<L>  |  1.52<H>    Ca    9.3      17 Nov 2017 13:08    TPro  7.4  /  Alb  3.8  /  TBili  0.4  /  DBili  x   /  AST  47<H>  /  ALT  41  /  AlkPhos  91  11-17    PT/INR - ( 17 Nov 2017 13:08 )   PT: 12.2 sec;   INR: 1.13 ratio         PTT - ( 17 Nov 2017 13:08 )  PTT:33.8 sec      Venous Blood Gas:  11-17 @ 13:08  7.35/42/31/23/44  VBG Lactate: 2.4      MICROBIOLOGY:     RADIOLOGY:  [ ] Reviewed and interpreted by me    Point of Care Ultrasound Findings;    PFT:    EKG: HPI:HISTORY OF PRESENT ILLNESS:  LIBAN RIVER is a 87y Female patient PMH HTN, HLD, Afib not on AC (likely due to falls), mod AS, Mod AI, Mod MS, 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  She was found to be in Afib RVR, hypotensive and hypoxic.     she denies chest pain, SOB, palpitations  endorses back pain        PAST MEDICAL & SURGICAL HISTORY:  Atrial fibrillation  Dementia  Pulmonary disease  Fall  CVA (cerebral vascular accident)  HTN (hypertension)  Epilepsy  S/P Hysterectomy      FAMILY HISTORY:  No pertinent family history in first degree relatives      SOCIAL HISTORY:  Smoking: __1 packs x 20___ years  EtOH Use:n	  Marital Status:w  Occupation:r  Exposures:n  Recent Travel:n    Allergies    No Known Allergies    Intolerances        HOME MEDICATIONS:atorvastatin, diltiazem, xopenex, budesinide, d3    REVIEW OF SYSTEMS:  Constitutional: No fevers or chills. No weight loss. No fatigue or generalised malaise.  Eyes: No itching or discharge from the eyes  ENT: No ear pain. No ear discharge. No nasal congestion. No post nasal drip. No epistaxis. No throat pain. No sore throat. No difficulty swallowing.   CV: No chest pain. No palpitations. No lightheadedness or dizziness.   Resp: No dyspnea at rest. No dyspnea on exertion. No orthopnea. No wheezing. No cough. No stridor. No sputum production. No chest pain with respiration.  GI: No nausea. No vomiting. No diarrhea.  MSK: No joint pain or pain in any extremities  Integumentary: No skin lesions. No pedal edema.  Neurological: No gross motor weakness. No sensory changes.    [ ] All other systems negative  [ ] Unable to assess ROS because ________    OBJECTIVE:  ICU Vital Signs Last 24 Hrs  T(C): 36.4 (18 Nov 2017 05:10), Max: 36.8 (17 Nov 2017 12:35)  T(F): 97.5 (18 Nov 2017 05:10), Max: 98.3 (17 Nov 2017 12:35)  HR: 72 (18 Nov 2017 05:35) (55 - 140)  BP: 129/77 (18 Nov 2017 05:10) (92/72 - 151/97)  BP(mean): --  ABP: --  ABP(mean): --  RR: 20 (18 Nov 2017 05:10) (18 - 30)  SpO2: 96% (18 Nov 2017 05:10) (92% - 97%)        CAPILLARY BLOOD GLUCOSE          PHYSICAL EXAM:  General: Awake, alert, oriented X 3.   HEENT: Atraumatic, normocephalic.                 Mallampatti Grade                 No nasal congestion.                No tonsillar or pharyngeal exudates.  Lymph Nodes: No palpable lymphadenopathy  Neck: No JVD. No carotid bruit.   Respiratory: Normal chest expansion                         Normal percussion                         Normal and equal air entry                         No wheeze, rhonchi or rales.  Cardiovascular: S1 S2 normal. No murmurs, rubs or gallops.   Abdomen: Soft, non-tender, non-distended. No organomegaly.  Extremities: Warm to touch. Peripheral pulse palpable. No pedal edema.   Skin: No rashes or skin lesions  Neurological: Motor and sensory examination equal and normal in all four extremities.  Psychiatry: Appropriate mood and affect.    HOSPITAL MEDICATIONS:  MEDICATIONS  (STANDING):  aspirin enteric coated 81 milliGRAM(s) Oral daily  atorvastatin 10 milliGRAM(s) Oral at bedtime  azithromycin  IVPB 500 milliGRAM(s) IV Intermittent every 24 hours  buDESOnide   0.5 milliGRAM(s) Respule 0.5 milliGRAM(s) Inhalation two times a day  cefTRIAXone   IVPB 1 Gram(s) IV Intermittent every 24 hours  cholecalciferol 2000 Unit(s) Oral daily  diltiazem    Tablet 60 milliGRAM(s) Oral every 6 hours  heparin  Injectable 5000 Unit(s) SubCutaneous every 8 hours  influenza   Vaccine 0.5 milliLiter(s) IntraMuscular once  levETIRAcetam 250 milliGRAM(s) Oral daily  montelukast 10 milliGRAM(s) Oral daily    MEDICATIONS  (PRN):  levalbuterol Inhalation 0.63 milliGRAM(s) Inhalation every 6 hours PRN shortness of breath / wheezing      LABS:                        10.7   12.1  )-----------( 381      ( 17 Nov 2017 13:08 )             31.9     11-17    141  |  104  |  52<H>  ----------------------------<  142<H>  4.7   |  21<L>  |  1.52<H>    Ca    9.3      17 Nov 2017 13:08    TPro  7.4  /  Alb  3.8  /  TBili  0.4  /  DBili  x   /  AST  47<H>  /  ALT  41  /  AlkPhos  91  11-17    PT/INR - ( 17 Nov 2017 13:08 )   PT: 12.2 sec;   INR: 1.13 ratio         PTT - ( 17 Nov 2017 13:08 )  PTT:33.8 sec      Venous Blood Gas:  11-17 @ 13:08  7.35/42/31/23/44  VBG Lactate: 2.4      MICROBIOLOGY:     RADIOLOGY:  [ ] Reviewed and interpreted by me    Point of Care Ultrasound Findings;    PFT:    EKG: HPI:HISTORY OF PRESENT ILLNESS:  LIBAN RIVER is a 87y Female patient PMH HTN, HLD, Afib not on AC (likely due to falls), mod AS, Mod AI, Mod MS, 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  She was found to be in Afib RVR, hypotensive and hypoxic.     she denies chest pain, SOB, palpitations----poorly communicative  endorses back pain        PAST MEDICAL & SURGICAL HISTORY:  Atrial fibrillation  Dementia  Pulmonary disease  Fall  CVA (cerebral vascular accident)  HTN (hypertension)  Epilepsy  S/P Hysterectomy      FAMILY HISTORY:  No pertinent family history in first degree relatives      SOCIAL HISTORY:  Smoking: __1 packs x 20___ years  EtOH Use:n	  Marital Status:w  Occupation:r  Exposures:n  Recent Travel:n    Allergies    No Known Allergies    Intolerances        HOME MEDICATIONS:atorvastatin, diltiazem, xopenex, budesinide, d3    REVIEW OF SYSTEMS:  Constitutional: No fevers or chills. No weight loss. No fatigue or generalised malaise.  Eyes: No itching or discharge from the eyes  ENT: No ear pain. No ear discharge. No nasal congestion. No post nasal drip. No epistaxis. No throat pain. No sore throat. No difficulty swallowing.   CV: No chest pain. No palpitations. No lightheadedness or dizziness.   Resp: No dyspnea at rest. No dyspnea on exertion. No orthopnea. No wheezing. No cough. No stridor. No sputum production. No chest pain with respiration.  GI: No nausea. No vomiting. No diarrhea.  MSK: No joint pain or pain in any extremities  Integumentary: No skin lesions. No pedal edema.  Neurological: No gross motor weakness. No sensory changes.    [ ] All other systems negative  [+ ] Unable to assess ROS because _dementia_______    OBJECTIVE:  ICU Vital Signs Last 24 Hrs  T(C): 36.4 (18 Nov 2017 05:10), Max: 36.8 (17 Nov 2017 12:35)  T(F): 97.5 (18 Nov 2017 05:10), Max: 98.3 (17 Nov 2017 12:35)  HR: 72 (18 Nov 2017 05:35) (55 - 140)  BP: 129/77 (18 Nov 2017 05:10) (92/72 - 151/97)  BP(mean): --  ABP: --  ABP(mean): --  RR: 20 (18 Nov 2017 05:10) (18 - 30)  SpO2: 96% (18 Nov 2017 05:10) (92% - 97%)        CAPILLARY BLOOD GLUCOSE          PHYSICAL EXAM: NAD on O2  General: Awake, alert, oriented X 1   HEENT: Atraumatic, normocephalic.                 Mallampatti Grade 2                No nasal congestion.                No tonsillar or pharyngeal exudates.  Lymph Nodes: No palpable lymphadenopathy  Neck: No JVD. No carotid bruit.   Respiratory: Normal chest expansion                         Normal percussion                         Normal and equal air entry                         No wheeze, rhonchi or rales.  Cardiovascular: S1 S2 normal. 2+ murmurs,no rubs or gallops.   Abdomen: Soft, non-tender, non-distended. No organomegaly.  Extremities: Warm to touch. Peripheral pulse palpable. No pedal edema.   Skin: No rashes or skin lesions  Neurological: Motor and sensory examination equal and normal in all four extremities.  Psychiatry: Appropriate mood and affect.    HOSPITAL MEDICATIONS:  MEDICATIONS  (STANDING):  aspirin enteric coated 81 milliGRAM(s) Oral daily  atorvastatin 10 milliGRAM(s) Oral at bedtime  azithromycin  IVPB 500 milliGRAM(s) IV Intermittent every 24 hours  buDESOnide   0.5 milliGRAM(s) Respule 0.5 milliGRAM(s) Inhalation two times a day  cefTRIAXone   IVPB 1 Gram(s) IV Intermittent every 24 hours  cholecalciferol 2000 Unit(s) Oral daily  diltiazem    Tablet 60 milliGRAM(s) Oral every 6 hours  heparin  Injectable 5000 Unit(s) SubCutaneous every 8 hours  influenza   Vaccine 0.5 milliLiter(s) IntraMuscular once  levETIRAcetam 250 milliGRAM(s) Oral daily  montelukast 10 milliGRAM(s) Oral daily    MEDICATIONS  (PRN):  levalbuterol Inhalation 0.63 milliGRAM(s) Inhalation every 6 hours PRN shortness of breath / wheezing      LABS:                        10.7   12.1  )-----------( 381      ( 17 Nov 2017 13:08 )             31.9     11-17    141  |  104  |  52<H>  ----------------------------<  142<H>  4.7   |  21<L>  |  1.52<H>    Ca    9.3      17 Nov 2017 13:08    TPro  7.4  /  Alb  3.8  /  TBili  0.4  /  DBili  x   /  AST  47<H>  /  ALT  41  /  AlkPhos  91  11-17    PT/INR - ( 17 Nov 2017 13:08 )   PT: 12.2 sec;   INR: 1.13 ratio         PTT - ( 17 Nov 2017 13:08 )  PTT:33.8 sec      Venous Blood Gas:  11-17 @ 13:08  7.35/42/31/23/44  VBG Lactate: 2.4      MICROBIOLOGY:     RADIOLOGY: < from: Xray Chest 1 View AP -PORTABLE-Routine (11.17.17 @ 13:35) >  Bilateral pleural effusions, right greater than left with associated   atelectasis.  Bilateral patchy opacities consistent with pulmonary edema.  There are no pleural effusions.  No evidence of pneumothorax.    Mild degenerative changes of the spine are noted.  The cardiac silhouette is mildly enlarged.    IMPRESSION:    Bilateral patchy opacities consistent pulmonary edema.   Bilateral pleural effusions, right greater than left with associated   atelectasis.    < end of copied text >    [ ] Reviewed and interpreted by me    Point of Care Ultrasound Findings;    PFT:    EKG:

## 2017-11-18 NOTE — PROGRESS NOTE ADULT - PROBLEM SELECTOR PLAN 6
Patient w/ SOB x 1 wk, found to be hypoxic and tachypneic   NC to maintain O2 sat > 90%; Will wean as tolerated  C/w home meds montelukast, budesonide, xopenex Patient w/ SOB x 1 wk, found to be hypoxic and tachypneic   NC to maintain O2 sat > 90%; Will wean as tolerated  C/w home meds montelukast, budesonide, xopenex  Added spiriva 1 puff qd per pulm  Pulmonary toilet-incentive spirometry, Chest Pt-acapella/chest vest up to 5x daily per pulm

## 2017-11-18 NOTE — PROGRESS NOTE ADULT - PROBLEM SELECTOR PLAN 1
Patient met SIRS criteria on admission w/ hypotension, tachypnea, leukocytosis w/ neutrophilic predominance, and elevated lactate; Possibly 2/2 PNA as CXR w/ b/l patchy opacities   S/p 250 cc NS by EMS and additional 500 cc NS in ED; Will not aggressively resuscitate w/ IVF for now due to concern for acute HF exacerbation  C/w CTX and azithromycin (today is day 2 of abx) for now  RVP negative; F/u BCx, UCx, UA, and sputum cx  Monitor VS  Trend CBC Patient p/w SOB x 1 week w/ signs of overload on physical exam, elevated pro-BNP, and CXR concerning for acute HF exacerbation  Given lasix 20 IV x 1 per cards recs  Strict I+Os for goal net negative 500cc to 1L daily per cards recs; Will diurese as needed to meet goal  Monitor BMP, keep K above 4 and Mg above 2 per pulm  Daily weights  TTE from 1/2017 showing hyperdynamic LV, EF 75%, mild-moderate AS, severe AR, no segmental wall motion abnormality  Will obtain TTE  Pt's private cardiologist, Dr. Macias, to see pt today  Will obtain repeat CXR on 11/19 per pulm Patient p/w SOB x 1 week w/ signs of overload on physical exam, elevated pro-BNP, and CXR concerning for acute HF exacerbation  Given lasix 20 IV x 1 per cards recs  Strict I+Os for goal net negative 500cc to 1L daily per cards recs; Will diurese as needed to meet goal  Monitor BMP, keep K above 4 and Mg above 2 per pulm  Daily weights  TTE from 1/2017 showing hyperdynamic LV, EF 75%, mild-moderate AS, severe AR, no segmental wall motion abnormality  Will obtain TTE  Will obtain repeat CXR on 11/19 per pul  Cardiology on board, appreciate recommendations

## 2017-11-18 NOTE — CONSULT NOTE ADULT - PROBLEM SELECTOR RECOMMENDATION 6
GI prophylaxis:  PPI pre breakfast  aspiration precautions-all meals are to OOB as able.  PT evaluation  early ambulation-if able  incentive spirometry

## 2017-11-18 NOTE — PROGRESS NOTE ADULT - PROBLEM SELECTOR PLAN 10
DVT PPX: Subq heparin    Dispo: Pending clinical improvement DVT PPX: Subq heparin  GI PPX: PPI pre-breakfast    Dispo: F/u PT eval DVT PPX: Subq heparin  GI PPX: PPI pre-breakfast, dysphagia 3 diet (DASH/TLC)    Dispo: F/u PT eval DVT PPX: Subq heparin  GI PPX: PPI pre-breakfast, dysphagia 3 diet (DASH/TLC) w/ no concentrated phosphorus    Dispo: F/u PT eval

## 2017-11-18 NOTE — CONSULT NOTE ADULT - ASSESSMENT
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  She was found to be in Afib RVR, hypotensive and hypoxic. CXR is concerning for PNA with additional pulm edema.  The decompensated CHF is likly secondary to the rapid Afib in the setting of PNA and underlying valvular disease.  Troponin is mildly elevated due to increased demand. 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  She was found to be in Afib RVR, hypotensive and hypoxic. CXR is concerning Acute pulm edema.  The decompensated CHF is likly secondary to the rapid Afib in the setting of PNA and underlying valvular disease.  Troponin is mildly elevated due to increased demand. Doubt PNA.

## 2017-11-18 NOTE — PROGRESS NOTE ADULT - PROBLEM SELECTOR PLAN 9
Patient has dementia and is AAOx1 at baseline  Fall precautions  Enhanced supervision Patient has dementia and is AAOx1 at baseline  Fall precautions  Aspiration precautions  Enhanced supervision

## 2017-11-18 NOTE — CONSULT NOTE ADULT - ATTENDING COMMENTS
as above--situation is most c/w CHF and not PNA--no signs or sx to support (CXR c/w APE and not PNA)  would stop ABX (ceftriaxone and zithromax)  restart xopenex/pulmicort/spiriva; continue O2  Repeat CXR tomorrrow.  Cardiology mangement of AF and diuretics.  Edwin Briones MD-Pulmonary   [phone]

## 2017-11-18 NOTE — PROGRESS NOTE ADULT - SUBJECTIVE AND OBJECTIVE BOX
Patient is a 87y old  Female who presents with a chief complaint of Shortness of breath, generalized weakness, malaise x 1 week (17 Nov 2017 17:02)      SUBJECTIVE / OVERNIGHT EVENTS:  Patient seen and examined at bedside. NF intern was notified that pt was w/ sustained a fib with RVR > 140s, concerning per ED for transport to floors. ED RN noted that pt appeared to have increased WOB as she was "grunting," which she had not done earlier. Pt examined by NF intern and MAR. Pt denied SOB, CP, palpitations but endorsed being "uncomfortable everywhere." HR was persistently in 140s on monitor, SpO2 95% on 4L NC, SBP in the 140s. Pt was irregularly irregular w/ SM. Mild wheezing appreciated posteriorly w/ bibasilar crackles but no LE edema or JVD. Lopressor 5 IVP given w/ initial drop in HR to 80s for several minutes. HR subsequently increased and fluctuated between 90-120s w/ SBP in the 90-120s. Pt deemed stable for transport to floors. Pt converted to SR (60s) since 1:30AM. At time of conversion, pt had 4.29 second pause on tele. Hood stable (0.11 -> 0.12) but likely elevated 2/2 demand ischemia. Per cards, patient given asa 325 x 1. Cardizem switched from 60 bid to q6h for better HR control. Given lasix 20mg IV x1 in light of continued hypoxia on RA in setting of HF and pleural effusions. Early in AM, pt became agitated, took off NC, and desaturated to high 70s on RA. Tried enhanced supervision, redirecting, and haldol (total of 1.5mg) w/o success. Wrist restraints ordered and NC placed back on to prevent further hypoxia (saturating at 95% on 6L). Pt remains in sinus 70s. Pt is poor historian but denies f/c, cough, SOB, CP, palpitations, abd pain, and body aches this AM.     MEDICATIONS  (STANDING):  aspirin enteric coated 81 milliGRAM(s) Oral daily  atorvastatin 10 milliGRAM(s) Oral at bedtime  azithromycin  IVPB 500 milliGRAM(s) IV Intermittent every 24 hours  buDESOnide   0.5 milliGRAM(s) Respule 0.5 milliGRAM(s) Inhalation two times a day  cefTRIAXone   IVPB 1 Gram(s) IV Intermittent every 24 hours  cholecalciferol 2000 Unit(s) Oral daily  diltiazem    Tablet 60 milliGRAM(s) Oral every 6 hours  heparin  Injectable 5000 Unit(s) SubCutaneous every 8 hours  influenza   Vaccine 0.5 milliLiter(s) IntraMuscular once  levETIRAcetam 250 milliGRAM(s) Oral daily  montelukast 10 milliGRAM(s) Oral daily    MEDICATIONS  (PRN):  levalbuterol Inhalation 0.63 milliGRAM(s) Inhalation every 6 hours PRN shortness of breath / wheezing      Vital Signs Last 24 Hrs  T(C): 36.4 (18 Nov 2017 05:10), Max: 36.8 (17 Nov 2017 12:35)  T(F): 97.5 (18 Nov 2017 05:10), Max: 98.3 (17 Nov 2017 12:35)  HR: 72 (18 Nov 2017 05:35) (55 - 140)  BP: 129/77 (18 Nov 2017 05:10) (92/72 - 151/97)  BP(mean): --  RR: 20 (18 Nov 2017 05:10) (18 - 30)  SpO2: 96% (18 Nov 2017 05:10) (92% - 97%)  CAPILLARY BLOOD GLUCOSE        I&O's Summary      PHYSICAL EXAM:  GENERAL: NAD, breathing comfortably on NC  HEAD:  Atraumatic, Normocephalic  EYES: EOMI, conjunctiva and sclera clear  NECK: Supple, no JVD or LAD  CHEST/LUNG: Bibasilar crackles; No wheeze  HEART: Regular rate and rhythm; S1 and S2 present; +SM  ABDOMEN: Soft, Nontender, Nondistended; Bowel sounds present  EXTREMITIES:  No clubbing or cyanosis; B/l LE edema  PSYCH: AAOx1  NEUROLOGY: CNs II-XII grossly intact  SKIN: No rashes or lesions    LABS:                        10.7   12.1  )-----------( 381      ( 17 Nov 2017 13:08 )             31.9     WBC Trend: 12.1<--  11-17    141  |  104  |  52<H>  ----------------------------<  142<H>  4.7   |  21<L>  |  1.52<H>    Ca    9.3      17 Nov 2017 13:08    TPro  7.4  /  Alb  3.8  /  TBili  0.4  /  DBili  x   /  AST  47<H>  /  ALT  41  /  AlkPhos  91  11-17    Creatinine Trend: 1.52<--  PT/INR - ( 17 Nov 2017 13:08 )   PT: 12.2 sec;   INR: 1.13 ratio         PTT - ( 17 Nov 2017 13:08 )  PTT:33.8 sec  CARDIAC MARKERS ( 17 Nov 2017 20:27 )  x     / 0.12 ng/mL / x     / x     / x      CARDIAC MARKERS ( 17 Nov 2017 13:08 )  x     / 0.11 ng/mL / 50 U/L / x     / 3.2 ng/mL    Rapid Respiratory Viral Panel (11.17.17 @ 20:16)    Rapid RVP Result: [employer]: The Film[employer] RVP Rapid uses polymerase chain reaction (PCR) and melt  curve analysis to screen for adenovirus; coronavirus HKU1, NL63, 229E,  OC43; human metapneumovirus (hMPV); human enterovirus/rhinovirus  (Entero/RV); influenza A; influenza A/H1;influenza A/H3; influenza  A/H1-2009; influenza B; parainfluenza viruses 1, 2, 3, 4; respiratory  syncytial virus; Bordetella pertussis; Mycoplasma pneumoniae; and  Chlamydophila pneumoniae.    RADIOLOGY & ADDITIONAL TESTS:    Imaging Personally Reviewed:    Consultant(s) Notes Reviewed: Cardiology    Care Discussed with Consultants/Other Providers:    CONTACT #: [id number] Patient is a 87y old  Female who presents with a chief complaint of Shortness of breath, generalized weakness, malaise x 1 week (17 Nov 2017 17:02)      SUBJECTIVE / OVERNIGHT EVENTS:  Patient seen and examined at bedside. NF intern was notified that pt was w/ sustained a fib with RVR > 140s, concerning per ED for transport to floors. ED RN noted that pt appeared to have increased WOB as she was "grunting," which she had not done earlier. Pt examined by NF intern and MAR. Pt denied SOB, CP, palpitations but endorsed being "uncomfortable everywhere." HR was persistently in 140s on monitor, SpO2 95% on 4L NC, SBP in the 140s. Pt was irregularly irregular w/ SM. Mild wheezing appreciated posteriorly w/ bibasilar crackles but no LE edema or JVD. Lopressor 5 IVP given w/ initial drop in HR to 80s for several minutes. HR subsequently increased and fluctuated between 90-120s w/ SBP in the 90-120s. Pt deemed stable for transport to floors. Pt converted to SR (60s) since 1:30AM. At time of conversion, pt had 4.29 second pause on tele. Hood stable (0.11 -> 0.12) but likely elevated 2/2 demand ischemia. Per cards, patient given asa 325 x 1. Cardizem switched from 60 bid to q6h for better HR control. Given lasix 20mg IV x1 in light of continued hypoxia on RA in setting of HF and pleural effusions. Early in AM, pt became agitated, took off NC, and desaturated to high 70s on RA. Tried enhanced supervision, redirecting, and haldol (total of 1.5mg) w/o success. Wrist restraints ordered and NC placed back on to prevent further hypoxia (saturating at 95% on 6L). Pt remains in sinus 70s. Pt is poor historian but denies f/c, cough, SOB, CP, palpitations, abd pain, and body aches this AM.     MEDICATIONS  (STANDING):  aspirin enteric coated 81 milliGRAM(s) Oral daily  atorvastatin 10 milliGRAM(s) Oral at bedtime  azithromycin  IVPB 500 milliGRAM(s) IV Intermittent every 24 hours  buDESOnide   0.5 milliGRAM(s) Respule 0.5 milliGRAM(s) Inhalation two times a day  cefTRIAXone   IVPB 1 Gram(s) IV Intermittent every 24 hours  cholecalciferol 2000 Unit(s) Oral daily  diltiazem    Tablet 60 milliGRAM(s) Oral every 6 hours  heparin  Injectable 5000 Unit(s) SubCutaneous every 8 hours  influenza   Vaccine 0.5 milliLiter(s) IntraMuscular once  levETIRAcetam 250 milliGRAM(s) Oral daily  montelukast 10 milliGRAM(s) Oral daily    MEDICATIONS  (PRN):  levalbuterol Inhalation 0.63 milliGRAM(s) Inhalation every 6 hours PRN shortness of breath / wheezing      Vital Signs Last 24 Hrs  T(C): 36.4 (18 Nov 2017 05:10), Max: 36.8 (17 Nov 2017 12:35)  T(F): 97.5 (18 Nov 2017 05:10), Max: 98.3 (17 Nov 2017 12:35)  HR: 72 (18 Nov 2017 05:35) (55 - 140)  BP: 129/77 (18 Nov 2017 05:10) (92/72 - 151/97)  BP(mean): --  RR: 20 (18 Nov 2017 05:10) (18 - 30)  SpO2: 96% (18 Nov 2017 05:10) (92% - 97%)  CAPILLARY BLOOD GLUCOSE        I&O's Summary      PHYSICAL EXAM:  GENERAL: NAD, breathing comfortably on NC  HEAD:  Atraumatic, Normocephalic  EYES: EOMI, conjunctiva and sclera clear  NECK: Supple, no JVD or LAD  CHEST/LUNG: Bibasilar crackles; No wheeze  HEART: Regular rate and rhythm; S1 and S2 present; +SM  ABDOMEN: Soft, Nontender, Nondistended; Bowel sounds present  EXTREMITIES:  No clubbing or cyanosis; B/l LE edema  PSYCH: AAOx1  NEUROLOGY: CNs II-XII grossly intact  SKIN: No rashes or lesions    LABS:                                   11.5   12.8  )-----------( 392      ( 18 Nov 2017 06:34 )             35.3     WBC Trend: 12.8<--, 12.1<--  11-18    141  |  102  |  59<H>  ----------------------------<  150<H>  5.4<H>   |  19<L>  |  1.91<H>    Ca    9.4      18 Nov 2017 06:34  Phos  6.0     11-18  Mg     2.6     11-18    TPro  7.7  /  Alb  4.0  /  TBili  0.4  /  DBili  x   /  AST  193<H>  /  ALT  184<H>  /  AlkPhos  96  11-18    Creatinine Trend: 1.91<--, 1.52<--  PT/INR - ( 17 Nov 2017 13:08 )   PT: 12.2 sec;   INR: 1.13 ratio         PTT - ( 17 Nov 2017 13:08 )  PTT:33.8 sec  CARDIAC MARKERS ( 17 Nov 2017 20:27 )  x     / 0.12 ng/mL / x     / x     / x      CARDIAC MARKERS ( 17 Nov 2017 13:08 )  x     / 0.11 ng/mL / 50 U/L / x     / 3.2 ng/mL    Rapid Respiratory Viral Panel (11.17.17 @ 20:16)    Rapid RVP Result: [employer]: The Film[employer] RVP Rapid uses polymerase chain reaction (PCR) and melt  curve analysis to screen for adenovirus; coronavirus HKU1, NL63, 229E,  OC43; human metapneumovirus (hMPV); human enterovirus/rhinovirus  (Entero/RV); influenza A; influenza A/H1;influenza A/H3; influenza  A/H1-2009; influenza B; parainfluenza viruses 1, 2, 3, 4; respiratory  syncytial virus; Bordetella pertussis; Mycoplasma pneumoniae; and  Chlamydophila pneumoniae.    Lactate, Blood (11.18.17 @ 06:34)    Lactate, Blood: 1.4 mmol/L    RADIOLOGY & ADDITIONAL TESTS:    Imaging Personally Reviewed:    Consultant(s) Notes Reviewed: Cardiology    Care Discussed with Consultants/Other Providers:    CONTACT #: [id number] Patient is a 87y old  Female who presents with a chief complaint of Shortness of breath, generalized weakness, malaise x 1 week (17 Nov 2017 17:02)      SUBJECTIVE / OVERNIGHT EVENTS:  Patient seen and examined at bedside. NF intern was notified that pt was w/ sustained a fib with RVR > 140s, concerning per ED for transport to floors. ED RN noted that pt appeared to have increased WOB as she was "grunting," which she had not done earlier. Pt examined by NF intern and MAR. Pt denied SOB, CP, palpitations but endorsed being "uncomfortable everywhere." HR was persistently in 140s on monitor, SpO2 95% on 4L NC, SBP in the 140s. Pt was irregularly irregular w/ SM. Mild wheezing appreciated posteriorly w/ bibasilar crackles but no LE edema or JVD. Lopressor 5 IVP given w/ initial drop in HR to 80s for several minutes. HR subsequently increased and fluctuated between 90-120s w/ SBP in the 90-120s. Pt deemed stable for transport to floors. Pt converted to SR (60s) since 1:30AM. At time of conversion, pt had 4.29 second pause on tele. Hood stable (0.11 -> 0.12) but likely elevated 2/2 demand ischemia. Per cards, patient given asa 325 x 1. Cardizem switched from 60 bid to q6h for better HR control. Given lasix 20mg IV x1 in light of continued hypoxia on RA in setting of HF and pleural effusions. Early in AM, pt became agitated, took off NC, and desaturated to high 70s on RA. Tried enhanced supervision, redirecting, and haldol (total of 1.5mg) w/o success. Wrist restraints ordered and NC placed back on to prevent further hypoxia (saturating at 95% on 6L). Pt remains in sinus 70s. Pt is poor historian but denies f/c, cough, SOB, CP, palpitations, abd pain, and body aches this AM.     MEDICATIONS  (STANDING):  aspirin enteric coated 81 milliGRAM(s) Oral daily  atorvastatin 10 milliGRAM(s) Oral at bedtime  azithromycin  IVPB 500 milliGRAM(s) IV Intermittent every 24 hours  buDESOnide   0.5 milliGRAM(s) Respule 0.5 milliGRAM(s) Inhalation two times a day  cefTRIAXone   IVPB 1 Gram(s) IV Intermittent every 24 hours  cholecalciferol 2000 Unit(s) Oral daily  diltiazem    Tablet 60 milliGRAM(s) Oral every 6 hours  heparin  Injectable 5000 Unit(s) SubCutaneous every 8 hours  influenza   Vaccine 0.5 milliLiter(s) IntraMuscular once  levETIRAcetam 250 milliGRAM(s) Oral daily  montelukast 10 milliGRAM(s) Oral daily    MEDICATIONS  (PRN):  levalbuterol Inhalation 0.63 milliGRAM(s) Inhalation every 6 hours PRN shortness of breath / wheezing      Vital Signs Last 24 Hrs  T(C): 36.4 (18 Nov 2017 05:10), Max: 36.8 (17 Nov 2017 12:35)  T(F): 97.5 (18 Nov 2017 05:10), Max: 98.3 (17 Nov 2017 12:35)  HR: 72 (18 Nov 2017 05:35) (55 - 140)  BP: 129/77 (18 Nov 2017 05:10) (92/72 - 151/97)  BP(mean): --  RR: 20 (18 Nov 2017 05:10) (18 - 30)  SpO2: 96% (18 Nov 2017 05:10) (92% - 97%)  CAPILLARY BLOOD GLUCOSE        I&O's Summary      PHYSICAL EXAM:  GENERAL: NAD, breathing comfortably on NC  HEAD:  Atraumatic, Normocephalic  EYES: EOMI, conjunctiva and sclera clear  NECK: Supple, no JVD or LAD  CHEST/LUNG: Bibasilar crackles; No wheeze  HEART: Regular rate and rhythm; S1 and S2 present; +SM  ABDOMEN: Soft, Nontender, Nondistended; Bowel sounds present  EXTREMITIES:  No clubbing or cyanosis; B/l LE edema  PSYCH: AAOx1  NEUROLOGY: CNs II-XII grossly intact  SKIN: No rashes or lesions    LABS:                                   11.5   12.8  )-----------( 392      ( 18 Nov 2017 06:34 )             35.3     WBC Trend: 12.8<--, 12.1<--  11-18    141  |  102  |  59<H>  ----------------------------<  150<H>  5.4<H>   |  19<L>  |  1.91<H>    Ca    9.4      18 Nov 2017 06:34  Phos  6.0     11-18  Mg     2.6     11-18    TPro  7.7  /  Alb  4.0  /  TBili  0.4  /  DBili  x   /  AST  193<H>  /  ALT  184<H>  /  AlkPhos  96  11-18    Creatinine Trend: 1.91<--, 1.52<--  PT/INR - ( 17 Nov 2017 13:08 )   PT: 12.2 sec;   INR: 1.13 ratio         PTT - ( 17 Nov 2017 13:08 )  PTT:33.8 sec  CARDIAC MARKERS ( 18 Nov 2017 06:34 )  x     / 0.16 ng/mL / x     / x     / x      CARDIAC MARKERS ( 17 Nov 2017 20:27 )  x     / 0.12 ng/mL / x     / x     / x      CARDIAC MARKERS ( 17 Nov 2017 13:08 )  x     / 0.11 ng/mL / 50 U/L / x     / 3.2 ng/mL        Rapid Respiratory Viral Panel (11.17.17 @ 20:16)    Rapid RVP Result: Not[employer]: The FilmArray RVP Rapid uses polymerase chain reaction (PCR) and melt  curve analysis to screen for adenovirus; coronavirus HKU1, NL63, 229E,  OC43; human metapneumovirus (hMPV); human enterovirus/rhinovirus  (Entero/RV); influenza A; influenza A/H1;influenza A/H3; influenza  A/H1-2009; influenza B; parainfluenza viruses 1, 2, 3, 4; respiratory  syncytial virus; Bordetella pertussis; Mycoplasma pneumoniae; and  Chlamydophila pneumoniae.    Lactate, Blood (11.18.17 @ 06:34)    Lactate, Blood: 1.4 mmol/L    RADIOLOGY & ADDITIONAL TESTS:    Imaging Personally Reviewed:    Consultant(s) Notes Reviewed: Cardiology    Care Discussed with Consultants/Other Providers:    CONTACT #: [id number] Patient is a 87y old  Female who presents with a chief complaint of Shortness of breath, generalized weakness, malaise x 1 week (17 Nov 2017 17:02)      SUBJECTIVE / OVERNIGHT EVENTS:  Patient seen and examined at bedside. NF intern was notified that pt was w/ sustained a fib with RVR > 140s, concerning per ED for transport to floors. ED RN noted that pt appeared to have increased WOB as she was "grunting," which she had not done earlier. Pt examined by NF intern and MAR. Pt denied SOB, CP, palpitations but endorsed being "uncomfortable everywhere." HR was persistently in 140s on monitor, SpO2 95% on 4L NC, SBP in the 140s. Pt was irregularly irregular w/ SM. Mild wheezing appreciated posteriorly w/ bibasilar crackles but no LE edema or JVD. Lopressor 5 IVP given w/ initial drop in HR to 80s for several minutes. HR subsequently increased and fluctuated between 90-120s w/ SBP in the 90-120s. Pt deemed stable for transport to floors. Pt converted to SR (60s) since 1:30AM. At time of conversion, pt had 4.29 second pause on tele. Hood stable (0.11 -> 0.12) but likely elevated 2/2 demand ischemia. Per cards, patient given asa 325 x 1. Cardizem switched from 60 bid to q6h for better HR control. Given lasix 20mg IV x1 in light of continued hypoxia on RA in setting of HF and pleural effusions. Early in AM, pt became agitated, took off NC, and desaturated to high 70s on RA. Tried enhanced supervision, redirecting, and haldol (total of 1.5mg) w/o success. Wrist restraints ordered and NC placed back on to prevent further hypoxia (saturating at 95% on 6L). Pt remains in sinus 70s. Pt is poor historian but denies f/c, cough, SOB, CP, palpitations, abd pain, and body aches this AM.    MEDICATIONS  (STANDING):  aspirin enteric coated 81 milliGRAM(s) Oral daily  atorvastatin 10 milliGRAM(s) Oral at bedtime  azithromycin  IVPB 500 milliGRAM(s) IV Intermittent every 24 hours  buDESOnide   0.5 milliGRAM(s) Respule 0.5 milliGRAM(s) Inhalation two times a day  cefTRIAXone   IVPB 1 Gram(s) IV Intermittent every 24 hours  cholecalciferol 2000 Unit(s) Oral daily  diltiazem    Tablet 60 milliGRAM(s) Oral every 6 hours  heparin  Injectable 5000 Unit(s) SubCutaneous every 8 hours  influenza   Vaccine 0.5 milliLiter(s) IntraMuscular once  levETIRAcetam 250 milliGRAM(s) Oral daily  montelukast 10 milliGRAM(s) Oral daily    MEDICATIONS  (PRN):  levalbuterol Inhalation 0.63 milliGRAM(s) Inhalation every 6 hours PRN shortness of breath / wheezing      Vital Signs Last 24 Hrs  T(C): 36.4 (18 Nov 2017 05:10), Max: 36.8 (17 Nov 2017 12:35)  T(F): 97.5 (18 Nov 2017 05:10), Max: 98.3 (17 Nov 2017 12:35)  HR: 72 (18 Nov 2017 05:35) (55 - 140)  BP: 129/77 (18 Nov 2017 05:10) (92/72 - 151/97)  BP(mean): --  RR: 20 (18 Nov 2017 05:10) (18 - 30)  SpO2: 96% (18 Nov 2017 05:10) (92% - 97%)  CAPILLARY BLOOD GLUCOSE        I&O's Summary      PHYSICAL EXAM:  GENERAL: NC in place, grunting and coughing intermittently  HEAD:  Atraumatic, Normocephalic  EYES: EOMI, conjunctiva and sclera clear  NECK: Supple, no JVD or LAD  CHEST/LUNG: Bibasilar crackles, diffuse wheezing  HEART: Regular rate and rhythm; S1 and S2 present; +SM  ABDOMEN: Soft, Nontender, Nondistended; Bowel sounds present  EXTREMITIES:  No clubbing or cyanosis; RLE edema, LLE nonedematous  PSYCH: AAOx1  NEUROLOGY: CNs II-XII grossly intact  SKIN: No rashes or lesions    LABS:                                   11.5   12.8  )-----------( 392      ( 18 Nov 2017 06:34 )             35.3     WBC Trend: 12.8<--, 12.1<--  11-18    142  |  105  |  63<H>  ----------------------------<  116<H>  5.1   |  21<L>  |  1.89<H>    Ca    9.2      18 Nov 2017 09:02  Phos  5.9     11-18  Mg     2.6     11-18    TPro  7.7  /  Alb  4.0  /  TBili  0.4  /  DBili  x   /  AST  193<H>  /  ALT  184<H>  /  AlkPhos  96  11-18    Creatinine Trend: 1.89<--, 1.91<--, 1.52<--  PT/INR - ( 17 Nov 2017 13:08 )   PT: 12.2 sec;   INR: 1.13 ratio         PTT - ( 17 Nov 2017 13:08 )  PTT:33.8 sec  CARDIAC MARKERS ( 18 Nov 2017 06:34 )  x     / 0.16 ng/mL / x     / x     / x      CARDIAC MARKERS ( 17 Nov 2017 20:27 )  x     / 0.12 ng/mL / x     / x     / x      CARDIAC MARKERS ( 17 Nov 2017 13:08 )  x     / 0.11 ng/mL / 50 U/L / x     / 3.2 ng/mL        Rapid Respiratory Viral Panel (11.17.17 @ 20:16)    Rapid RVP Result: [facility]: The FilmArray RVP Rapid uses polymerase chain reaction (PCR) and melt  curve analysis to screen for adenovirus; coronavirus HKU1, NL63, 229E,  OC43; human metapneumovirus (hMPV); human enterovirus/rhinovirus  (Entero/RV); influenza A; influenza A/H1;influenza A/H3; influenza  A/H1-2009; influenza B; parainfluenza viruses 1, 2, 3, 4; respiratory  syncytial virus; Bordetella pertussis; Mycoplasma pneumoniae; and  Chlamydophila pneumoniae.    Lactate, Blood (11.18.17 @ 06:34)    Lactate, Blood: 1.4 mmol/L    RADIOLOGY & ADDITIONAL TESTS:    Imaging Personally Reviewed:    Consultant(s) Notes Reviewed: Cardiology    Care Discussed with Consultants/Other Providers:    CONTACT #: [id number]

## 2017-11-18 NOTE — PROGRESS NOTE ADULT - PROBLEM SELECTOR PLAN 2
Patient w/ hx of afib (not on AC), found to be in afib w/ RVR w/ HR up to 150s on assessment by EMS  Patient on cardizem 60 bid at home; Increased to 60 q6h per cards recs for better HR control  Tele monitoring  Pt's private cardiologist, Dr. Macias, to see pt today Patient w/ hx of afib (not on AC), found to be in afib w/ RVR w/ HR up to 150s on assessment by EMS  Patient on cardizem 60 bid at home; Increased to 60 q6h per cards recs for better HR control  Tele monitoring  Cardiology on board, appreciate recommendations

## 2017-11-18 NOTE — PROGRESS NOTE ADULT - PROBLEM SELECTOR PLAN 4
Patient w/ SCr 1.52 on admission (baseline SCr ~1.1); Likely 2/2 hypoperfusion  S/p 750 cc NS; Will not aggressively hydrate w/ IVF for now due to concern for acute HF exacerbation  Trend SCr  Renally dose medications  Avoid nephrotoxins

## 2017-11-18 NOTE — PROGRESS NOTE ADULT - SUBJECTIVE AND OBJECTIVE BOX
LIBAN RIVER    Patient is a 87y old  Female who presents with a chief complaint of Shortness of breath, generalized weakness, malaise x 1 week,rapid atrial fib,acute CHF,AS,AI,MR,HTN,and dementia.Now converted back to NSR.      Allergies    No Known Allergies    Intolerances      MEDICATIONS  (STANDING):  aspirin enteric coated 81 milliGRAM(s) Oral daily  atorvastatin 10 milliGRAM(s) Oral at bedtime  azithromycin  IVPB 500 milliGRAM(s) IV Intermittent every 24 hours  buDESOnide   0.5 milliGRAM(s) Respule 0.5 milliGRAM(s) Inhalation two times a day  cefTRIAXone   IVPB 1 Gram(s) IV Intermittent every 24 hours  cholecalciferol 2000 Unit(s) Oral daily  diltiazem    Tablet 60 milliGRAM(s) Oral every 6 hours  heparin  Injectable 5000 Unit(s) SubCutaneous every 8 hours  influenza   Vaccine 0.5 milliLiter(s) IntraMuscular once  levETIRAcetam 250 milliGRAM(s) Oral daily  montelukast 10 milliGRAM(s) Oral daily    MEDICATIONS  (PRN):  levalbuterol Inhalation 0.63 milliGRAM(s) Inhalation every 6 hours PRN shortness of breath / wheezing      ROS:  Positive:SOB,confusion    General: Denies weight loss, fevers, rash, decreased hearing  Cardiac: Denies chest pain,  orthopnea, PND, claudication, edema, snoring, daytime somnolence, palpitations, syncope  Resp: Denies cough, sputum, wheezing, hemoptysis  GI: Denies change in bowel habits, diarrhea, weight loss, melena, tarry stools,   nausea, vomiting, jaundice, abdominal pain, dysphagia  : Denies dysuria, nocturia, hematuria  Neuro: Denies tinnitus, headache, visual changes, weakness, dizziness or vertigo  Musculoskeletal: Denies neck pain back pain joint pain.  Skin: Denies rash, itching, dryness.  Endocrine: Denies polydipsia, polyuria  Psychiatric: Denies depression, anxiety      PHYSICAL EXAM:  Vital Signs Last 24 Hrs  T(C): 36.4 (18 Nov 2017 05:10), Max: 36.8 (17 Nov 2017 12:35)  T(F): 97.5 (18 Nov 2017 05:10), Max: 98.3 (17 Nov 2017 12:35)  HR: 72 (18 Nov 2017 05:35) (55 - 140)  BP: 129/77 (18 Nov 2017 05:10) (92/72 - 151/97)  BP(mean): --  RR: 20 (18 Nov 2017 05:10) (18 - 30)  SpO2: 96% (18 Nov 2017 05:10) (92% - 97%)  Daily Height in cm: 149.86 (18 Nov 2017 00:15)    Daily   I&O's Summary      General Appearance: 	 Alert, cooperative, no distress  HEENT: normocephalic, atraumatic, PERRLA, EOMI, conjunctiva normal, sclera anicteric,   Neck: no JVD,  carotid 2+  bilaterally without bruits, thyroid normal to inspection and palpation, no adenopathy, trachea midline  Lungs:  Decreased BS bases  Cor:  pmi 5th ICS MCL, regular rate and rhythm, S1 normal intensity, S2 normal intensity, 2/6 mid peaking murmur base and apex  Abdomen:	 soft, non-tender; bowel sounds normal; no masses,  no organomegaly  Extremities: without cyanosis, clubbing or edema  Vasc: 2-+ PT and DP pulses; no varicosities  Neurologic: A&O x 3 (time, place, person). Symmetric strength; limited exam  Musculoskeletal: no kyphosis, scoliosis; normal gait, normal tone  Skin: no rashes; limited exam    EKG:  Telemetry:NSR    Labs:  CBC Full  -  ( 17 Nov 2017 13:08 )  WBC Count : 12.1 K/uL  Hemoglobin : 10.7 g/dL  Hematocrit : 31.9 %  Platelet Count - Automated : 381 K/uL  Mean Cell Volume : 86.7 fl  Mean Cell Hemoglobin : 29.1 pg  Mean Cell Hemoglobin Concentration : 33.6 gm/dL  Auto Neutrophil # : 10.1 K/uL  Auto Lymphocyte # : 1.2 K/uL  Auto Monocyte # : 0.9 K/uL  Auto Eosinophil # : 0.0 K/uL  Auto Basophil # : 0.0 K/uL  Auto Neutrophil % : 82.8 %  Auto Lymphocyte % : 9.5 %  Auto Monocyte % : 7.3 %  Auto Eosinophil % : 0.1 %  Auto Basophil % : 0.4 %      CARDIAC MARKERS ( 17 Nov 2017 20:27 )  x     / 0.12 ng/mL / x     / x     / x      CARDIAC MARKERS ( 17 Nov 2017 13:08 )  x     / 0.11 ng/mL / 50 U/L / x     / 3.2 ng/mL      PT/INR - ( 17 Nov 2017 13:08 )   PT: 12.2 sec;   INR: 1.13 ratio         PTT - ( 17 Nov 2017 13:08 )  PTT:33.8 sec        Impression/Plan:Patient with AS/AI,HTN,hyperlipidemia,dementia admitted with rapid atrial fib. and acute CHF.Now with 4.2 second conversion pause in NSR.Stable HR,BP and O2 sats.Continue Diltiazem for HR and BP control.Lasix O>I.ASA,lipitor,SQ heparin.F/U anemia and renal FX.Continue O2 and Neb's.Mild increased Troponins medical therapy.Significant dementia.        Esdras Rodriguez MD, FACC  [facility]

## 2017-11-18 NOTE — CONSULT NOTE ADULT - SUBJECTIVE AND OBJECTIVE BOX
Initial Cardiology Attending Consult    CHIEF COMPLAINT:  LIBAN RIVER    HISTORY OF PRESENT ILLNESS:  LIBAN RIVER is a 87y Female patient presenting with ***.     Allergies    No Known Allergies    Intolerances    	    PAST MEDICAL & SURGICAL HISTORY:  Atrial fibrillation  Dementia  Pulmonary disease  Fall  CVA (cerebral vascular accident)  HTN (hypertension)  Epilepsy  S/P Hysterectomy      FAMILY HISTORY:  No pertinent family history in first degree relatives      SOCIAL HISTORY:    [ ] Non-smoker  [ ] Smoker  [ ] Alcohol      REVIEW OF SYSTEMS:  CONSTITUTIONAL: No fever, weight loss, or fatigue  EYES: No eye pain, visual disturbances, or discharge  ENMT:  No difficulty hearing, tinnitus, vertigo; No sinus or throat pain  NECK: No pain or stiffness  RESPIRATORY: No cough, wheezing, chills or hemoptysis; No Shortness of Breath  CARDIOVASCULAR: No chest pain, palpitations, passing out, dizziness, or leg swelling  GASTROINTESTINAL: No abdominal or epigastric pain. No nausea, vomiting, or hematemesis; No diarrhea or constipation. No melena or hematochezia.  GENITOURINARY: No dysuria, frequency, hematuria, or incontinence  NEUROLOGICAL: No headaches, memory loss, loss of strength, numbness, or tremors  SKIN: No itching, burning, rashes, or lesions   LYMPH Nodes: No enlarged glands  ENDOCRINE: No heat or cold intolerance; No hair loss  MUSCULOSKELETAL: No joint pain or swelling; No muscle, back, or extremity pain  PSYCHIATRIC: No depression, anxiety, mood swings, or difficulty sleeping  HEME/LYMPH: No easy bruising, or bleeding gums  ALLERY AND IMMUNOLOGIC: No hives or eczema	    [ ] All others negative	  [ ] Unable to obtain    PHYSICAL EXAM:  I&O's Summary      Appearance: Normal	  HEENT:   Normal oral mucosa, PERRL, EOMI	  Lymphatic: No lymphadenopathy  Cardiovascular: Normal S1 S2, No JVD, No murmurs, No edema  Respiratory: Lungs clear to auscultation	  Psychiatry: A & O x 3, Mood & affect appropriate  Gastrointestinal:  Soft, Non-tender, + BS	  Skin: No rashes, No ecchymoses, No cyanosis	  Neurologic: Non-focal  Extremities: Normal range of motion, No clubbing, cyanosis or edema  Vascular: Peripheral pulses palpable 2+ bilaterally    MEDICATIONS:  LIBAN RIVER    LABS:	 	    CBC Full  -  ( 17 Nov 2017 13:08 )  WBC Count : 12.1 K/uL  Hemoglobin : 10.7 g/dL  Hematocrit : 31.9 %  Platelet Count - Automated : 381 K/uL  Mean Cell Volume : 86.7 fl  Mean Cell Hemoglobin : 29.1 pg  Mean Cell Hemoglobin Concentration : 33.6 gm/dL  Auto Neutrophil # : 10.1 K/uL  Auto Lymphocyte # : 1.2 K/uL  Auto Monocyte # : 0.9 K/uL  Auto Eosinophil # : 0.0 K/uL  Auto Basophil # : 0.0 K/uL  Auto Neutrophil % : 82.8 %  Auto Lymphocyte % : 9.5 %  Auto Monocyte % : 7.3 %  Auto Eosinophil % : 0.1 %  Auto Basophil % : 0.4 %    11-17    141  |  104  |  52<H>  ----------------------------<  142<H>  4.7   |  21<L>  |  1.52<H>    Ca    9.3      17 Nov 2017 13:08    TPro  7.4  /  Alb  3.8  /  TBili  0.4  /  DBili  x   /  AST  47<H>  /  ALT  41  /  AlkPhos  91  11-17      proBNP:   Lipid Profile:   HgA1c:   TSH:     TROPONIN:  CARDIAC MARKERS ( 17 Nov 2017 20:27 )  x     / 0.12 ng/mL / x     / x     / x      CARDIAC MARKERS ( 17 Nov 2017 13:08 )  x     / 0.11 ng/mL / 50 U/L / x     / 3.2 ng/mL        TELEMETRY: 	    ECG:  	  RADIOLOGY:  OTHER: 	    CARDIAC TESTING/STUDIES:    [ ] Echocardiogram:    [ ]  Catheterization:  [ ] Stress Test:  	  	  ASSESSMENT/PLAN: Initial Cardiology Attending Consult    CHIEF COMPLAINT:   LIBAN RIVER    HISTORY OF PRESENT ILLNESS:  LIBAN RIVER is a 87y Female patient PMH HTN, HLD, Afib not on AC (likely due to falls), mod AS, Mod AI, Mod MS, 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  She was found to be in Afib RVR, hypotensive and hypoxic.     she denies chest pain, SOB, palpitations  endorses back pain      Allergies    No Known Allergies    Intolerances    	    PAST MEDICAL & SURGICAL HISTORY:  Atrial fibrillation  Dementia  Pulmonary disease  Fall  CVA (cerebral vascular accident)  HTN (hypertension)  Epilepsy  S/P Hysterectomy      FAMILY HISTORY:  No pertinent family history in first degree relatives      SOCIAL HISTORY:    [X ] Non-smoker  [ ] Smoker  [ ] Alcohol      REVIEW OF SYSTEMS: patient poor historian  CONSTITUTIONAL: No fever, + fatigue  EYES:No visual disturbances, or discharge  ENMT:  No difficulty hearing, tinnitus, vertigo; No sinus or throat pain  NECK: No pain or stiffness  RESPIRATORY: + dry cough, + wheezing, no  Shortness of Breath  CARDIOVASCULAR: No chest pain, palpitations, passing out, dizziness, or leg swelling  GASTROINTESTINAL: No abdominal or epigastric pain. No nausea, vomiting, or hematemesis; No diarrhea or constipation. No melena or hematochezia.  GENITOURINARY: No dysuria, frequency, hematuria, or incontinence  NEUROLOGICAL: No headaches, memory loss, loss of strength, numbness, or tremors  ENDOCRINE: No heat or cold intolerance; No hair loss  MUSCULOSKELETAL: No joint pain or swelling; + back pain    PHYSICAL EXAM:  I&O's Summary  Vital Signs Last 24 Hrs  T(C): 36.6 (18 Nov 2017 00:15), Max: 36.8 (17 Nov 2017 12:35)  T(F): 97.8 (18 Nov 2017 00:15), Max: 98.3 (17 Nov 2017 12:35)  HR: 90 (18 Nov 2017 00:15) (90 - 140)  BP: 106/72 (18 Nov 2017 00:15) (92/72 - 151/97)  BP(mean): --  RR: 24 (18 Nov 2017 00:15) (18 - 30)  SpO2: 92% (18 Nov 2017 00:15) (92% - 97%)    Appearance: disheveled Eld F sitting on stretcher with NC	  HEENT:   Normal oral mucosa, no JVD  Cardiovascular: irreg, tachycardic 2/6 mid peak SM, 3/6 DM at LUSB, No JVD, No edema  Respiratory: reduced br sounds bl, no wheeze  Psychiatry: A & O x 1, easily agitated  Gastrointestinal:  Soft, Non-tender, + BS	  Skin: No rashes, No ecchymoses, No cyanosis	  Neurologic: Non-focal  Extremities: Normal range of motion, No clubbing, cyanosis or edema  Vascular: Peripheral pulses palpable 2+ bilaterally    MEDICATIONS:  as per outpatient chart   diltiazem 60mb BID  ASA 81mg  lipitor 10mg    Home Medications:  aspirin 81 mg oral delayed release tablet: 1 tab(s) orally once a day (17 Nov 2017 19:13)  atorvastatin 10 mg oral tablet: 1 tab(s) orally once a day (at bedtime) (17 Nov 2017 19:13)  budesonide 0.5 mg/2 mL inhalation suspension: 2 milliliter(s) inhaled 2 times a day (17 Nov 2017 19:13)  dilTIAZem 60 mg oral tablet: 1 tab(s) orally 2 times a day (17 Nov 2017 19:13)  Keppra 250 mg oral tablet: 1 tab(s) orally once a day (at bedtime) (17 Nov 2017 19:13)  montelukast 10 mg oral tablet: 1 tab(s) orally once a day (17 Nov 2017 19:13)  Vitamin D3 2000 intl units oral tablet: 1 tab(s) orally once a day (17 Nov 2017 19:13)  Xopenex 0.63 mg/3 mL inhalation solution: 3 milliliter(s) inhaled every 6 hours, As Needed for shortness of breath/wheeze (17 Nov 2017 19:13)    LABS:	 	    CBC Full  -  ( 17 Nov 2017 13:08 )  WBC Count : 12.1 K/uL  Hemoglobin : 10.7 g/dL  Hematocrit : 31.9 %  Platelet Count - Automated : 381 K/uL  Mean Cell Volume : 86.7 fl  Mean Cell Hemoglobin : 29.1 pg  Mean Cell Hemoglobin Concentration : 33.6 gm/dL  Auto Neutrophil # : 10.1 K/uL  Auto Lymphocyte # : 1.2 K/uL  Auto Monocyte # : 0.9 K/uL  Auto Eosinophil # : 0.0 K/uL  Auto Basophil # : 0.0 K/uL  Auto Neutrophil % : 82.8 %  Auto Lymphocyte % : 9.5 %  Auto Monocyte % : 7.3 %  Auto Eosinophil % : 0.1 %  Auto Basophil % : 0.4 %    11-17    141  |  104  |  52<H>  ----------------------------<  142<H>  4.7   |  21<L>  |  1.52<H>    Ca    9.3      17 Nov 2017 13:08    TPro  7.4  /  Alb  3.8  /  TBili  0.4  /  DBili  x   /  AST  47<H>  /  ALT  41  /  AlkPhos  91  11-17      proBNP: 12121  Lipid Profile:   HgA1c:   TSH:     TROPONIN:  CARDIAC MARKERS ( 17 Nov 2017 20:27 )  x     / 0.12 ng/mL / x     / x     / x      CARDIAC MARKERS ( 17 Nov 2017 13:08 )  x     / 0.11 ng/mL / 50 U/L / x     / 3.2 ng/mL        TELEMETRY: 	    ECG:  	afib 123 bpm  RADIOLOGY:  OTHER: 	    CARDIAC TESTING/STUDIES:    [ ] Echocardiogram:Observations:  Mitral Valve: Mitral annular calcification and calcified  mitral leaflets with decreased diastolic opening.  Mild-moderate mitral regurgitation. Mean transmitral valve  gradient equals 6 mm Hg, consistent with moderate mitral  stenosis.  Aortic Valve/Aorta: Calcified aortic valve with decreased  opening. Leaflets ate not seen. Unable to quantify stenosis  in the settinh of sever AI. Appears heavily calcified, at  least mild- moderate stenosis Severe aortic regurgitation.  LVOT diameter: 1.8 cm.  Left Atrium: Normal left atrium.  LA volume index = 33  cc/m2.  Left Ventricle: Hyperdynamic left ventricle. Increased  relative wall thickness with normal left ventricular mass  index, consistent with concentric left ventricular  remodeling. Mild diastolic dysfunction (Stage I).  Right Heart: Normal right atrium. Normal right ventricular  size and function. Normal tricuspid valve. Normal pulmonic  valve. Mild pulmonic regurgitation.  Pericardium/Pleura: Normal pericardium with no pericardial  effusion.  ------------------------------------------------------------------------  Conclusions:  1. Mitral annular calcification and calcified mitral  leaflets with decreased diastolic opening. Mild-moderate  mitral regurgitation. Mean transmitral valve gradient  equals 6 mm Hg, consistent with moderate mitral stenosis.  2. Calcified aortic valve with decreased opening. Leaflets  ate not seen. Unable to quantify stenosis in the settinh of  sever AI. Appears heavily calcified, at least mild-  moderate stenosis Severe aortic regurgitation.  3. Increased relative wall thickness with normal left  ventricular mass index, consistent with concentric left  ventricular remodeling.  4. Hyperdynamic left ventricle.  5. Normal right ventricular size and function.  ------------------------------------------------------------------------  Confirmed on  1/4/2017     [ ]  Catheterization:  [ ] Stress Test:  	  	  ASSESSMENT/PLAN: 	  87y Female patient PMH HTN, HLD, Afib not on AC (likely due to falls), mod AS, Mod AI, Mod MS, 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  She was found to be in Afib RVR, hypotensive and hypoxic. CXR is concerning for PNA with additional pulm edema.  The decompensated CHF is likly secondary to the rapid Afib in the setting of PNA and underlying valvular disease.  Troponin is mildly elevated due to increased demand.    Afib RVR  -increase home dilt 60 mg bid to to 60mg q6hr for improved HR control  -give 20mg IV lasix x1 and ascess for changes in BP and HR and oxygen sat,   -monitor ins and outs,   -goal net neg 500cc to 1L daily    NSTEMI- demand ischemia  -ASA 325mg , then 81mg daily  -continue statin    -please contact patients outpatient cardiologist Dr Jake Macias to see if he would like to continue her care while inpatient    -cardiology will follow    Leo Mast MD, PhD  Cardiology Attending  [phone]

## 2017-11-18 NOTE — PROGRESS NOTE ADULT - PROBLEM SELECTOR PLAN 5
Patient w/ troponin 0.11 on admission, likely 2/2 demand ischemia; ACS less likely given no ischemic changes on EKG  Repeat troponin 0.12  F/u AM troponin level  Given asa 325 x 1 overnight  C/w home atorvastatin and asa 81 daily Patient w/ troponin 0.11 on admission, likely 2/2 demand ischemia; ACS less likely given no ischemic changes on EKG  Second troponin 0.12, third troponin 0.16  Will continue to monitor troponin  Given asa 325 x 1 overnight  C/w home atorvastatin and asa 81 daily

## 2017-11-19 LAB
ANION GAP SERPL CALC-SCNC: 16 MMOL/L — SIGNIFICANT CHANGE UP (ref 5–17)
BUN SERPL-MCNC: 62 MG/DL — HIGH (ref 7–23)
CALCIUM SERPL-MCNC: 9.1 MG/DL — SIGNIFICANT CHANGE UP (ref 8.4–10.5)
CHLORIDE SERPL-SCNC: 105 MMOL/L — SIGNIFICANT CHANGE UP (ref 96–108)
CO2 SERPL-SCNC: 19 MMOL/L — LOW (ref 22–31)
CREAT SERPL-MCNC: 1.82 MG/DL — HIGH (ref 0.5–1.3)
GLUCOSE SERPL-MCNC: 152 MG/DL — HIGH (ref 70–99)
HCT VFR BLD CALC: 32.7 % — LOW (ref 34.5–45)
HGB BLD-MCNC: 10.5 G/DL — LOW (ref 11.5–15.5)
MAGNESIUM SERPL-MCNC: 2.4 MG/DL — SIGNIFICANT CHANGE UP (ref 1.6–2.6)
MCHC RBC-ENTMCNC: 27.9 PG — SIGNIFICANT CHANGE UP (ref 27–34)
MCHC RBC-ENTMCNC: 32.1 GM/DL — SIGNIFICANT CHANGE UP (ref 32–36)
MCV RBC AUTO: 86.8 FL — SIGNIFICANT CHANGE UP (ref 80–100)
PHOSPHATE SERPL-MCNC: 4.2 MG/DL — SIGNIFICANT CHANGE UP (ref 2.5–4.5)
PLATELET # BLD AUTO: 327 K/UL — SIGNIFICANT CHANGE UP (ref 150–400)
POTASSIUM SERPL-MCNC: 4.2 MMOL/L — SIGNIFICANT CHANGE UP (ref 3.5–5.3)
POTASSIUM SERPL-SCNC: 4.2 MMOL/L — SIGNIFICANT CHANGE UP (ref 3.5–5.3)
RBC # BLD: 3.77 M/UL — LOW (ref 3.8–5.2)
RBC # FLD: 15 % — HIGH (ref 10.3–14.5)
SODIUM SERPL-SCNC: 140 MMOL/L — SIGNIFICANT CHANGE UP (ref 135–145)
WBC # BLD: 10.9 K/UL — HIGH (ref 3.8–10.5)
WBC # FLD AUTO: 10.9 K/UL — HIGH (ref 3.8–10.5)

## 2017-11-19 PROCEDURE — 99233 SBSQ HOSP IP/OBS HIGH 50: CPT

## 2017-11-19 PROCEDURE — 99233 SBSQ HOSP IP/OBS HIGH 50: CPT | Mod: GC

## 2017-11-19 PROCEDURE — 71010: CPT | Mod: 26

## 2017-11-19 RX ORDER — FUROSEMIDE 40 MG
40 TABLET ORAL DAILY
Qty: 0 | Refills: 0 | Status: DISCONTINUED | OUTPATIENT
Start: 2017-11-19 | End: 2017-11-20

## 2017-11-19 RX ADMIN — LEVALBUTEROL 0.63 MILLIGRAM(S): 1.25 SOLUTION, CONCENTRATE RESPIRATORY (INHALATION) at 05:00

## 2017-11-19 RX ADMIN — ATORVASTATIN CALCIUM 10 MILLIGRAM(S): 80 TABLET, FILM COATED ORAL at 21:20

## 2017-11-19 RX ADMIN — Medication 2000 UNIT(S): at 12:29

## 2017-11-19 RX ADMIN — Medication 0.5 MILLIGRAM(S): at 17:11

## 2017-11-19 RX ADMIN — Medication 81 MILLIGRAM(S): at 12:28

## 2017-11-19 RX ADMIN — TIOTROPIUM BROMIDE 1 CAPSULE(S): 18 CAPSULE ORAL; RESPIRATORY (INHALATION) at 12:29

## 2017-11-19 RX ADMIN — Medication 40 MILLIGRAM(S): at 12:26

## 2017-11-19 RX ADMIN — MONTELUKAST 10 MILLIGRAM(S): 4 TABLET, CHEWABLE ORAL at 12:28

## 2017-11-19 RX ADMIN — HEPARIN SODIUM 5000 UNIT(S): 5000 INJECTION INTRAVENOUS; SUBCUTANEOUS at 21:20

## 2017-11-19 RX ADMIN — LEVETIRACETAM 250 MILLIGRAM(S): 250 TABLET, FILM COATED ORAL at 12:28

## 2017-11-19 RX ADMIN — PANTOPRAZOLE SODIUM 40 MILLIGRAM(S): 20 TABLET, DELAYED RELEASE ORAL at 05:01

## 2017-11-19 RX ADMIN — HEPARIN SODIUM 5000 UNIT(S): 5000 INJECTION INTRAVENOUS; SUBCUTANEOUS at 05:01

## 2017-11-19 RX ADMIN — LEVALBUTEROL 0.63 MILLIGRAM(S): 1.25 SOLUTION, CONCENTRATE RESPIRATORY (INHALATION) at 12:29

## 2017-11-19 RX ADMIN — LEVALBUTEROL 0.63 MILLIGRAM(S): 1.25 SOLUTION, CONCENTRATE RESPIRATORY (INHALATION) at 17:11

## 2017-11-19 RX ADMIN — Medication 0.5 MILLIGRAM(S): at 05:00

## 2017-11-19 RX ADMIN — LEVALBUTEROL 0.63 MILLIGRAM(S): 1.25 SOLUTION, CONCENTRATE RESPIRATORY (INHALATION) at 23:15

## 2017-11-19 NOTE — PROGRESS NOTE ADULT - SUBJECTIVE AND OBJECTIVE BOX
Cardiology Progress Note      Patient is a 87y old  Female who presents with a chief complaint of Shortness of breath, generalized weakness, malaise x 1 week (2017 17:02)    HPI: Patient is a 87y Female with history of mild AS AI MR HBP dementia COPD and new onset AF with ?chf. who hd a 4.2 sec pause post conversion back to NSR on the  who went back into A-fib at 3:45 AM  She is asx lying flat in bed at a rate of 140 due for her cardizem now. She hs no complaints and says shes happy.    Allergies    No Known Allergies    Intolerances      MEDICATIONS  (STANDING):  aspirin enteric coated 81 milliGRAM(s) Oral daily  atorvastatin 10 milliGRAM(s) Oral at bedtime  buDESOnide   0.5 milliGRAM(s) Respule 0.5 milliGRAM(s) Inhalation two times a day  cholecalciferol 2000 Unit(s) Oral daily  diltiazem    Tablet 60 milliGRAM(s) Oral every 6 hours  furosemide   Injectable 40 milliGRAM(s) IV Push daily  heparin  Injectable 5000 Unit(s) SubCutaneous every 8 hours  influenza   Vaccine 0.5 milliLiter(s) IntraMuscular once  levalbuterol Inhalation 0.63 milliGRAM(s) Inhalation every 6 hours  levETIRAcetam 250 milliGRAM(s) Oral daily  montelukast 10 milliGRAM(s) Oral daily  pantoprazole    Tablet 40 milliGRAM(s) Oral before breakfast  tiotropium 18 MICROgram(s) Capsule 1 Capsule(s) Inhalation daily    MEDICATIONS  (PRN):      ROS alll neg    General: Denies weight loss, fevers, rash, decreased hearing  Cardiac: Denies chest pain, SOB, GILBERT, orthopnea, PND, claudication, edema, snoring, daytime somnolence, palpitations, syncope  Resp: Denies SOB, GILBERT, cough, sputum, wheezing, hemoptysis  GI: Denies change in bowel habits, diarrhea, weight loss, melena, tarry stools,   nausea, vomiting, jaundice, abdominal pain, dysphagia  : Denies dysuria, nocturia, hematuria  Neuro: Denies tinnitus, headache, visual changes, weakness, dizziness or vertigo  Musculoskeletal: Denies neck pain back pain joint pain.  Skin: Denies rash, itching, dryness.  Endocrine: Denies polydipsia, polyuria  Psychiatric: Denies depression, anxiety      PHYSICAL EXAM:  Vital Signs Last 24 Hrs  T(C): 36.7 (2017 03:52), Max: 36.7 (2017 14:10)  T(F): 98.1 (2017 03:52), Max: 98.1 (2017 14:10)  HR: 94 (2017 03:52) (67 - 94)  BP: 126/73 (2017 03:52) (122/73 - 146/65)  BP(mean): --  RR: 18 (2017 03:52) (18 - 22)  SpO2: 95% (2017 03:52) (94% - 97%)  Daily     Daily Weight in k.4 (2017 06:09)  I&O's Summary    2017 07:01  -  2017 07:00  --------------------------------------------------------  IN: 180 mL / OUT: 0 mL / NET: 180 mL    2017 07:01  -  2017 13:11  --------------------------------------------------------  IN: 120 mL / OUT: 0 mL / NET: 120 mL        General Appearance: 	 Alert, cooperative, no distress  HEENT: normocephalic, atraumatic, PERRLA, EOMI, conjunctiva normal, sclera anicteric,   Neck: no JVD,  carotid 2+  bilaterally without bruits, thyroid normal to inspection and palpation, no adenopathy, trachea midline  Lungs:  scattered wheeze  Cor:  pmi 5th ICS MCL, regular rate and rhythm, HT S1 and S2 decreased , no gallops,  Gr2/6 basal and apical murmurs   Abdomen:	 soft, non-tender; bowel sounds normal; no masses,  no organomegaly  Extremities: without cyanosis, clubbing or edema  Vasc: 2-+ PT and DP pulses; no varicosities  Neurologic: A&O x 3 (time, place, person). Symmetric strength; limited exam  Musculoskeletal: no kyphosis, scoliosis; normal gait, normal tone  Skin: no rashes; limited exam    EKG: A-fib rapid VR  Telemetry:    Labs:  CBC Full  -  ( 2017 06:58 )  WBC Count : 10.9 K/uL  Hemoglobin : 10.5 g/dL  Hematocrit : 32.7 %  Platelet Count - Automated : 327 K/uL  Mean Cell Volume : 86.8 fl  Mean Cell Hemoglobin : 27.9 pg  Mean Cell Hemoglobin Concentration : 32.1 gm/dL  Auto Neutrophil # : x  Auto Lymphocyte # : x  Auto Monocyte # : x  Auto Eosinophil # : x  Auto Basophil # : x  Auto Neutrophil % : x  Auto Lymphocyte % : x  Auto Monocyte % : x  Auto Eosinophil % : x  Auto Basophil % : x         Impression/Plan: 86 yo with likely portia-tachy, valvular HD with COPD. In no distress with A fib but at risk for AC.                           Continue cardizem, cautious parmeters for low dose additional doses for rates over 100.                            DVT prophilaxis  SQ heparin or lovenox for stroke prevention    Joel Goldberg, MD, FACC  [facility]

## 2017-11-19 NOTE — PROGRESS NOTE ADULT - PROBLEM SELECTOR PLAN 1
Patient originally p/w SOB x 1 week w/ signs of overload on physical exam, elevated pro-BNP, and CXR concerning for acute HF exacerbation  Given lasix 20 IV x 1 per cards recs  -volume status now seems to be improving; will continue with lasix IV 40 daily  Strict I+Os for goal net negative 500cc to 1L daily per cards recs; Will diurese as needed to meet goal  Monitor BMP, keep K above 4 and Mg above 2 per pulm  Daily weights  TTE from 1/2017 showing hyperdynamic LV, EF 75%, mild-moderate AS, severe AR, no segmental wall motion abnormality  Will obtain TTE  Cardiology following

## 2017-11-19 NOTE — PROGRESS NOTE ADULT - SUBJECTIVE AND OBJECTIVE BOX
CHIEF COMPLAINT: denies all complaints-except poor sleep; not sob or cough; dry mouth    Interval Events: none    REVIEW OF SYSTEMS:  Constitutional: No fevers or chills. No weight loss. No fatigue or generalized malaise.  Eyes: No itching or discharge from the eyes  ENT: No ear pain. No ear discharge. No nasal congestion. No post nasal drip. No epistaxis. No throat pain. No sore throat. No difficulty swallowing.   CV: No chest pain. No palpitations. No lightheadedness or dizziness.   Resp: No dyspnea at rest. No dyspnea on exertion. No orthopnea. No wheezing. No cough. No stridor. No sputum production. No chest pain with respiration.  GI: No nausea. No vomiting. No diarrhea.  MSK: No joint pain or pain in any extremities  Integumentary: No skin lesions. No pedal edema.  Neurological: No gross motor weakness. No sensory changes.  [+ ] All other systems negative  [ ] Unable to assess ROS because ________    OBJECTIVE:  ICU Vital Signs Last 24 Hrs  T(C): 36.7 (19 Nov 2017 03:52), Max: 36.7 (18 Nov 2017 14:10)  T(F): 98.1 (19 Nov 2017 03:52), Max: 98.1 (18 Nov 2017 14:10)  HR: 94 (19 Nov 2017 03:52) (67 - 94)  BP: 126/73 (19 Nov 2017 03:52) (122/73 - 202/90)  BP(mean): --  ABP: --  ABP(mean): --  RR: 18 (19 Nov 2017 03:52) (18 - 22)  SpO2: 95% (19 Nov 2017 03:52) (92% - 97%)        11-18 @ 07:01  -  11-19 @ 05:51  --------------------------------------------------------  IN: 180 mL / OUT: 0 mL / NET: 180 mL      CAPILLARY BLOOD GLUCOSE          PHYSICAL EXAM: NAD on NC  General: Awake, alert, oriented X 3.   HEENT: Atraumatic, normocephalic.                 Mallampatti Grade 2                No nasal congestion.                No tonsillar or pharyngeal exudates.  Lymph Nodes: No palpable lymphadenopathy  Neck: No JVD. No carotid bruit.   Respiratory: reduced chest expansion                         dullness at bases to percussion                         Normal and equal air entry                         No wheeze, rhonchi or rales.  Cardiovascular: S1 S2 normal. + murmurs,no rubs or gallops.   Abdomen: Soft, non-tender, non-distended. No organomegaly.  Extremities: Warm to touch. Peripheral pulse palpable. No pedal edema.   Skin: No rashes or skin lesions  Neurological: Motor and sensory examination equal and normal in all four extremities.  Psychiatry: Appropriate mood and affect.    HOSPITAL MEDICATIONS:  MEDICATIONS  (STANDING):  aspirin enteric coated 81 milliGRAM(s) Oral daily  atorvastatin 10 milliGRAM(s) Oral at bedtime  buDESOnide   0.5 milliGRAM(s) Respule 0.5 milliGRAM(s) Inhalation two times a day  cholecalciferol 2000 Unit(s) Oral daily  diltiazem    Tablet 60 milliGRAM(s) Oral every 6 hours  heparin  Injectable 5000 Unit(s) SubCutaneous every 8 hours  influenza   Vaccine 0.5 milliLiter(s) IntraMuscular once  levalbuterol Inhalation 0.63 milliGRAM(s) Inhalation every 6 hours  levETIRAcetam 250 milliGRAM(s) Oral daily  montelukast 10 milliGRAM(s) Oral daily  pantoprazole    Tablet 40 milliGRAM(s) Oral before breakfast  tiotropium 18 MICROgram(s) Capsule 1 Capsule(s) Inhalation daily    MEDICATIONS  (PRN):      LABS:                        11.5   12.8  )-----------( 392      ( 18 Nov 2017 06:34 )             35.3     11-18    142  |  105  |  63<H>  ----------------------------<  116<H>  5.1   |  21<L>  |  1.89<H>    Ca    9.2      18 Nov 2017 09:02  Phos  5.9     11-18  Mg     2.6     11-18    TPro  7.7  /  Alb  4.0  /  TBili  0.4  /  DBili  x   /  AST  193<H>  /  ALT  184<H>  /  AlkPhos  96  11-18    PT/INR - ( 17 Nov 2017 13:08 )   PT: 12.2 sec;   INR: 1.13 ratio         PTT - ( 17 Nov 2017 13:08 )  PTT:33.8 sec      Venous Blood Gas:  11-17 @ 13:08  7.35/42/31/23/44  VBG Lactate: 2.4      MICROBIOLOGY:     RADIOLOGY:  [ ] Reviewed and interpreted by me    Point of Care Ultrasound Findings:    PFT:    EKG:

## 2017-11-19 NOTE — PROGRESS NOTE ADULT - PROBLEM SELECTOR PLAN 5
Patient w/ troponin 0.11 on admission, likely 2/2 demand ischemia; ACS less likely given no ischemic changes on EKG  Second troponin 0.12, third troponin 0.16  Will continue to monitor troponin  C/w home atorvastatin and asa 81 daily

## 2017-11-19 NOTE — PROGRESS NOTE ADULT - PROBLEM SELECTOR PLAN 2
Patient w/ hx of afib (not on AC), found to be in afib w/ RVR w/ HR up to 150s on assessment by EMS  -since being in the hospital, patient has been in and out of sinus  -continue diltiazem 60 mg q6  Tele monitoring  Cardiology on board, appreciate recommendations

## 2017-11-19 NOTE — PROGRESS NOTE ADULT - PROBLEM SELECTOR PLAN 3
levalbuterol q 6h via HHN  pulmicort .5 via HHN q 12  spiriva 1 puff q day  singulair 10 q hs  pulmonary toilet-incentive spirometry, Chest Pt-acapella/chest vest-up to 5 times per day.    maintain oxygen levels above 90%-supplemental oxygen via nasal canula-humidified    All nebulized therapy is to be administered via hand held nebulizer-(patient must gargle and spit with water after use)

## 2017-11-19 NOTE — PROGRESS NOTE ADULT - PROBLEM SELECTOR PLAN 6
Patient w/ SOB x 1 wk, found to be hypoxic and tachypneic   NC to maintain O2 sat > 90%; Will wean as tolerated  C/w home meds montelukast, budesonide, xopenex  Added spiriva 1 puff qd per pulm  Pulmonary toilet-incentive spirometry, Chest Pt-acapella/chest vest up to 5x daily per pulm

## 2017-11-19 NOTE — PROGRESS NOTE ADULT - PROBLEM SELECTOR PLAN 6
DVT prophlaxis:  subcutaneous lovenox or heparin as per primary team  sequential teds stockings  early ambulation  GI prophylaxis:  PPI pre breakfast  aspiration precautions-all meals are to OOB as able

## 2017-11-19 NOTE — PROGRESS NOTE ADULT - PROBLEM SELECTOR PLAN 10
DVT PPX: Subq heparin  GI PPX: PPI pre-breakfast, dysphagia 3 diet (DASH/TLC) w/ no concentrated phosphorus    Dispo: F/u PT eval

## 2017-11-19 NOTE — PROGRESS NOTE ADULT - ATTENDING COMMENTS
as above--situation is most c/w CHF and not PNA--no signs or sx to support (CXR c/w APE and not PNA)  ABX (ceftriaxone and zithromax)--discontinued (I agree)  restart xopenex/pulmicort/spiriva; continue O2  Repeat CXR tomorrrow.  Cardiology mangement of AF and diuretics.  Edwin Briones MD-Pulmonary   [phone]

## 2017-11-19 NOTE — PROGRESS NOTE ADULT - PROBLEM SELECTOR PLAN 9
Patient has dementia and is AAOx1 at baseline  Fall precautions  Aspiration precautions  Enhanced supervision

## 2017-11-19 NOTE — PROGRESS NOTE ADULT - SUBJECTIVE AND OBJECTIVE BOX
Patient is a 87y old  Female who presents with a chief complaint of Shortness of breath, generalized weakness, malaise x 1 week (17 Nov 2017 17:02)      SUBJECTIVE / OVERNIGHT EVENTS:  Patient had converted to sinus rhythm yesterday, but now appears to be back in atrial fibrillation. Patient has no major complaints or symptoms at this time.    REVIEW OF SYSTEMS:    CONSTITUTIONAL: +tired; No weakness, fevers or chills  EYES/ENT: No visual changes;  No vertigo or throat pain   NECK: No pain or stiffness  RESPIRATORY: No cough, wheezing, hemoptysis; No shortness of breath  CARDIOVASCULAR: No chest pain or palpitations  GASTROINTESTINAL: No abdominal or epigastric pain. No nausea, vomiting, or hematemesis; No diarrhea or constipation. No melena or hematochezia.  GENITOURINARY: No dysuria, frequency or hematuria  NEUROLOGICAL: No numbness or weakness  SKIN: No itching, burning, rashes, or lesions   All other review of systems is negative unless indicated above.    MEDICATIONS  (STANDING):  aspirin enteric coated 81 milliGRAM(s) Oral daily  atorvastatin 10 milliGRAM(s) Oral at bedtime  buDESOnide   0.5 milliGRAM(s) Respule 0.5 milliGRAM(s) Inhalation two times a day  cholecalciferol 2000 Unit(s) Oral daily  diltiazem    Tablet 60 milliGRAM(s) Oral every 6 hours  furosemide   Injectable 40 milliGRAM(s) IV Push daily  heparin  Injectable 5000 Unit(s) SubCutaneous every 8 hours  influenza   Vaccine 0.5 milliLiter(s) IntraMuscular once  levalbuterol Inhalation 0.63 milliGRAM(s) Inhalation every 6 hours  levETIRAcetam 250 milliGRAM(s) Oral daily  montelukast 10 milliGRAM(s) Oral daily  pantoprazole    Tablet 40 milliGRAM(s) Oral before breakfast  tiotropium 18 MICROgram(s) Capsule 1 Capsule(s) Inhalation daily    MEDICATIONS  (PRN):      CAPILLARY BLOOD GLUCOSE        I&O's Summary    18 Nov 2017 07:01  -  19 Nov 2017 07:00  --------------------------------------------------------  IN: 180 mL / OUT: 0 mL / NET: 180 mL        PHYSICAL EXAM:  GENERAL: NAD, laying in bed; occasionally grunting but denying any pain  EYES:  PERRLA, conjunctiva and sclera clear  NECK: Supple, No JVD  CHEST/LUNG: Clear to auscultation bilaterally; No wheeze  HEART: irregular, tachycardic; No murmurs, rubs, or gallops  ABDOMEN: Soft, Nontender, Nondistended; Bowel sounds present  EXTREMITIES:  No clubbing, cyanosis, or edema  PSYCH: normal mood/affect; answers questions appropriately  NEUROLOGY: AAOx1; no focal deficits  SKIN: No rashes or lesions    LABS:                        10.5   10.9  )-----------( 327      ( 19 Nov 2017 06:58 )             32.7     11-19    140  |  105  |  62<H>  ----------------------------<  152<H>  4.2   |  19<L>  |  1.82<H>    Ca    9.1      19 Nov 2017 06:58  Phos  4.2     11-19  Mg     2.4     11-19    TPro  7.7  /  Alb  4.0  /  TBili  0.4  /  DBili  x   /  AST  193<H>  /  ALT  184<H>  /  AlkPhos  96  11-18    PT/INR - ( 17 Nov 2017 13:08 )   PT: 12.2 sec;   INR: 1.13 ratio         PTT - ( 17 Nov 2017 13:08 )  PTT:33.8 sec  CARDIAC MARKERS ( 18 Nov 2017 06:34 )  x     / 0.16 ng/mL / x     / x     / x      CARDIAC MARKERS ( 17 Nov 2017 20:27 )  x     / 0.12 ng/mL / x     / x     / x      CARDIAC MARKERS ( 17 Nov 2017 13:08 )  x     / 0.11 ng/mL / 50 U/L / x     / 3.2 ng/mL            RADIOLOGY & ADDITIONAL TESTS:    Imaging Personally Reviewed:    Consultant(s) Notes Reviewed:  Pulmonology    Care Discussed with Consultants/Other Providers:

## 2017-11-20 ENCOUNTER — APPOINTMENT (OUTPATIENT)
Dept: HOME HEALTH SERVICES | Facility: HOME HEALTH | Age: 82
End: 2017-11-20

## 2017-11-20 ENCOUNTER — CLINICAL ADVICE (OUTPATIENT)
Age: 82
End: 2017-11-20

## 2017-11-20 DIAGNOSIS — I48.0 PAROXYSMAL ATRIAL FIBRILLATION: ICD-10-CM

## 2017-11-20 DIAGNOSIS — N18.9 CHRONIC KIDNEY DISEASE, UNSPECIFIED: ICD-10-CM

## 2017-11-20 DIAGNOSIS — I35.0 NONRHEUMATIC AORTIC (VALVE) STENOSIS: ICD-10-CM

## 2017-11-20 DIAGNOSIS — I50.9 HEART FAILURE, UNSPECIFIED: ICD-10-CM

## 2017-11-20 PROBLEM — I48.91 UNSPECIFIED ATRIAL FIBRILLATION: Chronic | Status: ACTIVE | Noted: 2017-11-17

## 2017-11-20 PROBLEM — F03.90 UNSPECIFIED DEMENTIA WITHOUT BEHAVIORAL DISTURBANCE: Chronic | Status: ACTIVE | Noted: 2017-11-17

## 2017-11-20 LAB
ANION GAP SERPL CALC-SCNC: 19 MMOL/L — HIGH (ref 5–17)
APPEARANCE UR: CLEAR — SIGNIFICANT CHANGE UP
BILIRUB UR-MCNC: NEGATIVE — SIGNIFICANT CHANGE UP
BUN SERPL-MCNC: 56 MG/DL — HIGH (ref 7–23)
CALCIUM SERPL-MCNC: 9.1 MG/DL — SIGNIFICANT CHANGE UP (ref 8.4–10.5)
CHLORIDE SERPL-SCNC: 104 MMOL/L — SIGNIFICANT CHANGE UP (ref 96–108)
CO2 SERPL-SCNC: 23 MMOL/L — SIGNIFICANT CHANGE UP (ref 22–31)
COLOR SPEC: SIGNIFICANT CHANGE UP
CREAT SERPL-MCNC: 1.67 MG/DL — HIGH (ref 0.5–1.3)
DIFF PNL FLD: NEGATIVE — SIGNIFICANT CHANGE UP
GLUCOSE SERPL-MCNC: 121 MG/DL — HIGH (ref 70–99)
GLUCOSE UR QL: NEGATIVE — SIGNIFICANT CHANGE UP
HCT VFR BLD CALC: 32.6 % — LOW (ref 34.5–45)
HGB BLD-MCNC: 10.7 G/DL — LOW (ref 11.5–15.5)
KETONES UR-MCNC: NEGATIVE — SIGNIFICANT CHANGE UP
LEUKOCYTE ESTERASE UR-ACNC: NEGATIVE — SIGNIFICANT CHANGE UP
MAGNESIUM SERPL-MCNC: 2.3 MG/DL — SIGNIFICANT CHANGE UP (ref 1.6–2.6)
MCHC RBC-ENTMCNC: 28.3 PG — SIGNIFICANT CHANGE UP (ref 27–34)
MCHC RBC-ENTMCNC: 32.8 GM/DL — SIGNIFICANT CHANGE UP (ref 32–36)
MCV RBC AUTO: 86.4 FL — SIGNIFICANT CHANGE UP (ref 80–100)
NITRITE UR-MCNC: NEGATIVE — SIGNIFICANT CHANGE UP
PH UR: 6 — SIGNIFICANT CHANGE UP (ref 5–8)
PHOSPHATE SERPL-MCNC: 3.9 MG/DL — SIGNIFICANT CHANGE UP (ref 2.5–4.5)
PLATELET # BLD AUTO: 389 K/UL — SIGNIFICANT CHANGE UP (ref 150–400)
POTASSIUM SERPL-MCNC: 4.3 MMOL/L — SIGNIFICANT CHANGE UP (ref 3.5–5.3)
POTASSIUM SERPL-SCNC: 4.3 MMOL/L — SIGNIFICANT CHANGE UP (ref 3.5–5.3)
PROT UR-MCNC: NEGATIVE — SIGNIFICANT CHANGE UP
RBC # BLD: 3.78 M/UL — LOW (ref 3.8–5.2)
RBC # FLD: 14.7 % — HIGH (ref 10.3–14.5)
SODIUM SERPL-SCNC: 146 MMOL/L — HIGH (ref 135–145)
SP GR SPEC: 1.01 — SIGNIFICANT CHANGE UP (ref 1.01–1.02)
UROBILINOGEN FLD QL: NEGATIVE — SIGNIFICANT CHANGE UP
WBC # BLD: 11.4 K/UL — HIGH (ref 3.8–10.5)
WBC # FLD AUTO: 11.4 K/UL — HIGH (ref 3.8–10.5)

## 2017-11-20 PROCEDURE — 93306 TTE W/DOPPLER COMPLETE: CPT | Mod: 26

## 2017-11-20 PROCEDURE — 99233 SBSQ HOSP IP/OBS HIGH 50: CPT

## 2017-11-20 PROCEDURE — 71010: CPT | Mod: 26

## 2017-11-20 PROCEDURE — 99233 SBSQ HOSP IP/OBS HIGH 50: CPT | Mod: GC

## 2017-11-20 RX ORDER — DILTIAZEM HCL 120 MG
120 CAPSULE, EXT RELEASE 24 HR ORAL DAILY
Qty: 0 | Refills: 0 | Status: DISCONTINUED | OUTPATIENT
Start: 2017-11-20 | End: 2017-11-27

## 2017-11-20 RX ORDER — AMIODARONE HYDROCHLORIDE 400 MG/1
200 TABLET ORAL
Qty: 0 | Refills: 0 | Status: DISCONTINUED | OUTPATIENT
Start: 2017-11-20 | End: 2017-11-21

## 2017-11-20 RX ORDER — FUROSEMIDE 40 MG
20 TABLET ORAL DAILY
Qty: 0 | Refills: 0 | Status: DISCONTINUED | OUTPATIENT
Start: 2017-11-20 | End: 2017-11-22

## 2017-11-20 RX ADMIN — LEVETIRACETAM 250 MILLIGRAM(S): 250 TABLET, FILM COATED ORAL at 11:30

## 2017-11-20 RX ADMIN — LEVALBUTEROL 0.63 MILLIGRAM(S): 1.25 SOLUTION, CONCENTRATE RESPIRATORY (INHALATION) at 11:31

## 2017-11-20 RX ADMIN — LEVALBUTEROL 0.63 MILLIGRAM(S): 1.25 SOLUTION, CONCENTRATE RESPIRATORY (INHALATION) at 05:54

## 2017-11-20 RX ADMIN — ATORVASTATIN CALCIUM 10 MILLIGRAM(S): 80 TABLET, FILM COATED ORAL at 20:33

## 2017-11-20 RX ADMIN — LEVALBUTEROL 0.63 MILLIGRAM(S): 1.25 SOLUTION, CONCENTRATE RESPIRATORY (INHALATION) at 23:19

## 2017-11-20 RX ADMIN — AMIODARONE HYDROCHLORIDE 200 MILLIGRAM(S): 400 TABLET ORAL at 20:33

## 2017-11-20 RX ADMIN — HEPARIN SODIUM 5000 UNIT(S): 5000 INJECTION INTRAVENOUS; SUBCUTANEOUS at 05:54

## 2017-11-20 RX ADMIN — HEPARIN SODIUM 5000 UNIT(S): 5000 INJECTION INTRAVENOUS; SUBCUTANEOUS at 15:36

## 2017-11-20 RX ADMIN — HEPARIN SODIUM 5000 UNIT(S): 5000 INJECTION INTRAVENOUS; SUBCUTANEOUS at 21:33

## 2017-11-20 RX ADMIN — Medication 2000 UNIT(S): at 11:30

## 2017-11-20 RX ADMIN — Medication 0.5 MILLIGRAM(S): at 17:42

## 2017-11-20 RX ADMIN — PANTOPRAZOLE SODIUM 40 MILLIGRAM(S): 20 TABLET, DELAYED RELEASE ORAL at 05:54

## 2017-11-20 RX ADMIN — Medication 40 MILLIGRAM(S): at 05:54

## 2017-11-20 RX ADMIN — Medication 81 MILLIGRAM(S): at 11:30

## 2017-11-20 RX ADMIN — LEVALBUTEROL 0.63 MILLIGRAM(S): 1.25 SOLUTION, CONCENTRATE RESPIRATORY (INHALATION) at 17:42

## 2017-11-20 RX ADMIN — MONTELUKAST 10 MILLIGRAM(S): 4 TABLET, CHEWABLE ORAL at 11:31

## 2017-11-20 RX ADMIN — AMIODARONE HYDROCHLORIDE 200 MILLIGRAM(S): 400 TABLET ORAL at 11:29

## 2017-11-20 RX ADMIN — Medication 0.5 MILLIGRAM(S): at 05:54

## 2017-11-20 NOTE — PROGRESS NOTE ADULT - PROBLEM SELECTOR PLAN 6
Patient w/ SOB x 1 wk, found to be hypoxic and tachypneic   NC to maintain O2 sat > 90%; Will wean as tolerated  C/w montelukast, budesonide, xopenex, and spiriva per pulm  Pulmonary toilet-incentive spirometry, Chest Pt-acapella/chest vest up to 5x daily per pulm

## 2017-11-20 NOTE — PROGRESS NOTE ADULT - PROBLEM SELECTOR PLAN 5
Patient w/ troponin 0.11 on admission, likely 2/2 demand ischemia; ACS less likely given no ischemic changes on EKG  Second troponin 0.12, third troponin 0.16  C/w home atorvastatin and asa 81 daily Patient w/ troponin 0.11 on admission, likely 2/2 demand ischemia (Type II NSTEMI); ACS less likely given no ischemic changes on EKG  Second troponin 0.12, third troponin 0.16  C/w home atorvastatin and asa 81 daily

## 2017-11-20 NOTE — PHYSICAL THERAPY INITIAL EVALUATION ADULT - IMPAIRMENTS CONTRIBUTING IMPAIRED BED MOBILITY, REHAB EVAL
sat x 6 min min unsteady , pebbles Roman in to listen to lungs in sitting ; pt off nc o2 few min and drop at rest on RA to 86% pt placed back on 4 L nc o2 93% pulse ox/impaired postural control/impaired balance/decreased strength/cognition

## 2017-11-20 NOTE — PROGRESS NOTE ADULT - PROBLEM SELECTOR PLAN 2
Patient w/ hx of afib (not on AC), found to be in afib w/ RVR w/ HR up to 150s on assessment by EMS  -during this admission, patient has been in and out of sinus; most likely has tachy-portia  -continue diltiazem 60 mg q6; cardiology recommending low dose additional doses for rates >100  Tele monitoring  Cardiology on board, appreciate recommendations Patient w/ hx of afib (not on AC), found to be in afib w/ RVR w/ HR up to 150s on assessment by EMS  -during this admission, patient has been in and out of sinus; most likely has tachy-portia  -cardizem 120 qd per cardiology  -added amiodarone 200 bid per cardiology  -to discuss dose-adjusted NOAC w/ pt's family and cardiology  Tele monitoring Patient w/ hx of afib (not on AC), found to be in afib w/ RVR w/ HR up to 150s on assessment by EMS  -during this admission, patient has been in and out of sinus; ?tachy-portia syndrome  -cardizem 120 qd per cardiology  -added amiodarone 200 bid per cardiology  -to discuss dose-adjusted NOAC w/ pt's family and cardiology (as pt does have a history of falls)  -continue tele monitoring

## 2017-11-20 NOTE — PHYSICAL THERAPY INITIAL EVALUATION ADULT - GAIT DEVIATIONS NOTED, PT EVAL
decreased weight-shifting ability/decreased ezequiel/decreased stride length/increased time in double stance/min unsteady/decreased step length

## 2017-11-20 NOTE — PROGRESS NOTE ADULT - SUBJECTIVE AND OBJECTIVE BOX
Patient is a 87y old  Female who presents with a chief complaint of Shortness of breath, generalized weakness, malaise x 1 week (2017 17:02)      SUBJECTIVE / OVERNIGHT EVENTS:  Patient seen and examined at bedside. No acute events overnight. Back in sinus rhythm. Pt is poor historian but denies fever, cough, SOB, CP, palpitations, abd pain, and body aches this AM.    MEDICATIONS  (STANDING):  aspirin enteric coated 81 milliGRAM(s) Oral daily  atorvastatin 10 milliGRAM(s) Oral at bedtime  buDESOnide   0.5 milliGRAM(s) Respule 0.5 milliGRAM(s) Inhalation two times a day  cholecalciferol 2000 Unit(s) Oral daily  diltiazem    Tablet 60 milliGRAM(s) Oral every 6 hours  furosemide   Injectable 40 milliGRAM(s) IV Push daily  heparin  Injectable 5000 Unit(s) SubCutaneous every 8 hours  influenza   Vaccine 0.5 milliLiter(s) IntraMuscular once  levalbuterol Inhalation 0.63 milliGRAM(s) Inhalation every 6 hours  levETIRAcetam 250 milliGRAM(s) Oral daily  montelukast 10 milliGRAM(s) Oral daily  pantoprazole    Tablet 40 milliGRAM(s) Oral before breakfast  tiotropium 18 MICROgram(s) Capsule 1 Capsule(s) Inhalation daily    MEDICATIONS  (PRN):      Vital Signs Last 24 Hrs  T(C): 36.3 (2017 04:47), Max: 36.9 (2017 20:48)  T(F): 97.4 (2017 04:47), Max: 98.4 (2017 20:48)  HR: 97 (2017 04:47) (78 - 97)  BP: 128/75 (2017 04:47) (100/54 - 135/75)  BP(mean): --  RR: 18 (2017 04:47) (18 - 18)  SpO2: 94% (2017 04:47) (94% - 98%)  CAPILLARY BLOOD GLUCOSE        I&O's Summary    2017 07:01  -  2017 07:00  --------------------------------------------------------  IN: 180 mL / OUT: 0 mL / NET: 180 mL    2017 07:01  -  2017 06:31  --------------------------------------------------------  IN: 600 mL / OUT: 700 mL / NET: -100 mL        PHYSICAL EXAM:  GENERAL: NAD, laying in bed; occasionally grunting but denying any pain  HEAD: NC/AT  EYES:  PERRLA, conjunctiva and sclera clear  NECK: Supple, No JVD  CHEST/LUNG: Clear to auscultation bilaterally; No wheeze  HEART: Regular rate and rhythm; +SM  ABDOMEN: Soft, Nontender, Nondistended; Bowel sounds present  EXTREMITIES:  No clubbing, cyanosis, or edema  PSYCH: Normal mood/affect; answers questions appropriately  NEUROLOGY: AAOx1; no focal deficits  SKIN: No rashes or lesions    LABS:                        10.7   11.4  )-----------( 389      ( 2017 06:14 )             32.6     WBC Trend: 11.4<--, 10.9<--, 12.8<--  1119    140  |  105  |  62<H>  ----------------------------<  152<H>  4.2   |  19<L>  |  1.82<H>    Ca    9.1      2017 06:58  Phos  4.2       Mg     2.4         TPro  7.7  /  Alb  4.0  /  TBili  0.4  /  DBili  x   /  AST  193<H>  /  ALT  184<H>  /  AlkPhos  96      Creatinine Trend: 1.82<--, 1.89<--, 1.91<--, 1.52<--    CARDIAC MARKERS ( 2017 06:34 )  x     / 0.16 ng/mL / x     / x     / x          Urinalysis Basic - ( 2017 00:16 )    Color: PL Yellow / Appearance: Clear / S.011 / pH: x  Gluc: x / Ketone: Negative  / Bili: Negative / Urobili: Negative   Blood: x / Protein: Negative / Nitrite: Negative   Leuk Esterase: Negative / RBC: x / WBC x   Sq Epi: x / Non Sq Epi: x / Bacteria: x    Culture - Blood (17 @ 15:50)    Specimen Source: .Blood Blood    Culture Results:   No growth to date.    Culture - Blood (17 @ 15:50)    Specimen Source: .Blood Blood    Culture Results:   No growth to date.    RADIOLOGY & ADDITIONAL TESTS:    Imaging Personally Reviewed:  CXR: < from: Xray Chest 1 View AP -PORTABLE-Routine (17 @ 08:44) >  Small bilateral pleural effusions and moderate pulmonary   edema, not significantly changed. 1.3 cm nodular opacity in the right   lower lobe, not definitively seen on the prior exam. If warranted, CT   scan of the chest may be performed for further evaluation.    Consultant(s) Notes Reviewed: Pulmonology, Cardiology    Care Discussed with Consultants/Other Providers:    CONTACT #: [id number] Patient is a 87y old  Female who presents with a chief complaint of Shortness of breath, generalized weakness, malaise x 1 week (2017 17:02)      SUBJECTIVE / OVERNIGHT EVENTS:  Patient seen and examined at bedside. No acute events overnight. Still in afib but rate-controlled. Pt is poor historian but denies fever, cough, SOB, CP, palpitations, abd pain, and body aches this AM.    MEDICATIONS  (STANDING):  aspirin enteric coated 81 milliGRAM(s) Oral daily  atorvastatin 10 milliGRAM(s) Oral at bedtime  buDESOnide   0.5 milliGRAM(s) Respule 0.5 milliGRAM(s) Inhalation two times a day  cholecalciferol 2000 Unit(s) Oral daily  diltiazem    Tablet 60 milliGRAM(s) Oral every 6 hours  furosemide   Injectable 40 milliGRAM(s) IV Push daily  heparin  Injectable 5000 Unit(s) SubCutaneous every 8 hours  influenza   Vaccine 0.5 milliLiter(s) IntraMuscular once  levalbuterol Inhalation 0.63 milliGRAM(s) Inhalation every 6 hours  levETIRAcetam 250 milliGRAM(s) Oral daily  montelukast 10 milliGRAM(s) Oral daily  pantoprazole    Tablet 40 milliGRAM(s) Oral before breakfast  tiotropium 18 MICROgram(s) Capsule 1 Capsule(s) Inhalation daily    MEDICATIONS  (PRN):      Vital Signs Last 24 Hrs  T(C): 36.3 (2017 04:47), Max: 36.9 (2017 20:48)  T(F): 97.4 (2017 04:47), Max: 98.4 (2017 20:48)  HR: 97 (2017 04:47) (78 - 97)  BP: 128/75 (2017 04:47) (100/54 - 135/75)  BP(mean): --  RR: 18 (2017 04:47) (18 - 18)  SpO2: 94% (2017 04:47) (94% - 98%)  CAPILLARY BLOOD GLUCOSE        I&O's Summary    2017 07:01  -  2017 07:00  --------------------------------------------------------  IN: 180 mL / OUT: 0 mL / NET: 180 mL    2017 07:01  -  2017 06:31  --------------------------------------------------------  IN: 600 mL / OUT: 700 mL / NET: -100 mL        PHYSICAL EXAM:  GENERAL: NAD, laying in bed; occasionally grunting but denying any pain  HEAD: NC/AT  EYES:  PERRLA, conjunctiva and sclera clear  NECK: Supple, No JVD  CHEST/LUNG: Clear to auscultation bilaterally; No wheeze  HEART: Regular rate, irregular rhythm; +SM  ABDOMEN: Soft, Nontender, Nondistended; Bowel sounds present  EXTREMITIES:  No clubbing, cyanosis, or edema  PSYCH: Normal mood/affect; answers questions appropriately  NEUROLOGY: AAOx1; no focal deficits  SKIN: No rashes or lesions    LABS:                        10.7   11.4  )-----------( 389      ( 2017 06:14 )             32.6     WBC Trend: 11.4<--, 10.9<--, 12.8<--  11-20    146<H>  |  104  |  56<H>  ----------------------------<  121<H>  4.3   |  23  |  1.67<H>    Ca    9.1      2017 06:14  Phos  3.9     20  Mg     2.3     -20    Creatinine Trend: 1.67<--, 1.82<--, 1.89<--, 1.91<--, 1.52<--    CARDIAC MARKERS ( 2017 06:34 )  x     / 0.16 ng/mL / x     / x     / x          Urinalysis Basic - ( 2017 00:16 )    Color: PL Yellow / Appearance: Clear / S.011 / pH: x  Gluc: x / Ketone: Negative  / Bili: Negative / Urobili: Negative   Blood: x / Protein: Negative / Nitrite: Negative   Leuk Esterase: Negative / RBC: x / WBC x   Sq Epi: x / Non Sq Epi: x / Bacteria: x    Culture - Blood (17 @ 15:50)    Specimen Source: .Blood Blood    Culture Results:   No growth to date.    Culture - Blood (17 @ 15:50)    Specimen Source: .Blood Blood    Culture Results:   No growth to date.    RADIOLOGY & ADDITIONAL TESTS:    Imaging Personally Reviewed:  CXR: < from: Xray Chest 1 View AP -PORTABLE-Routine (17 @ 08:44) >  Small bilateral pleural effusions and moderate pulmonary   edema, not significantly changed. 1.3 cm nodular opacity in the right   lower lobe, not definitively seen on the prior exam. If warranted, CT   scan of the chest may be performed for further evaluation.    Consultant(s) Notes Reviewed: Pulmonology, Cardiology    Care Discussed with Consultants/Other Providers:    CONTACT #: [id number] Patient is a 87y old  Female who presents with a chief complaint of Shortness of breath, generalized weakness, malaise x 1 week (2017 17:02)      SUBJECTIVE / OVERNIGHT EVENTS:  Patient seen and examined at bedside. No acute events overnight. Still in afib but rate-controlled. Pt is poor historian but denies fever, cough, SOB, CP, palpitations, abd pain, and body aches this AM.    MEDICATIONS  (STANDING):  aspirin enteric coated 81 milliGRAM(s) Oral daily  atorvastatin 10 milliGRAM(s) Oral at bedtime  buDESOnide   0.5 milliGRAM(s) Respule 0.5 milliGRAM(s) Inhalation two times a day  cholecalciferol 2000 Unit(s) Oral daily  diltiazem    Tablet 60 milliGRAM(s) Oral every 6 hours  furosemide   Injectable 40 milliGRAM(s) IV Push daily  heparin  Injectable 5000 Unit(s) SubCutaneous every 8 hours  influenza   Vaccine 0.5 milliLiter(s) IntraMuscular once  levalbuterol Inhalation 0.63 milliGRAM(s) Inhalation every 6 hours  levETIRAcetam 250 milliGRAM(s) Oral daily  montelukast 10 milliGRAM(s) Oral daily  pantoprazole    Tablet 40 milliGRAM(s) Oral before breakfast  tiotropium 18 MICROgram(s) Capsule 1 Capsule(s) Inhalation daily    MEDICATIONS  (PRN):      Vital Signs Last 24 Hrs  T(C): 36.3 (2017 04:47), Max: 36.9 (2017 20:48)  T(F): 97.4 (2017 04:47), Max: 98.4 (2017 20:48)  HR: 97 (2017 04:47) (78 - 97)  BP: 128/75 (2017 04:47) (100/54 - 135/75)  BP(mean): --  RR: 18 (2017 04:47) (18 - 18)  SpO2: 94% (2017 04:47) (94% - 98%)  CAPILLARY BLOOD GLUCOSE        I&O's Summary    2017 07:01  -  2017 07:00  --------------------------------------------------------  IN: 180 mL / OUT: 0 mL / NET: 180 mL    2017 07:01  -  2017 06:31  --------------------------------------------------------  IN: 600 mL / OUT: 700 mL / NET: -100 mL        PHYSICAL EXAM:  GENERAL: NAD, laying in bed; breathing comfortably on NC  HEAD: NC/AT  EYES:  PERRLA, conjunctiva and sclera clear  NECK: Supple, No JVD  CHEST/LUNG: Clear to auscultation bilaterally; No wheeze  HEART: Regular rate, irregular rhythm; +SM  ABDOMEN: Soft, Nontender, Nondistended; Bowel sounds present  EXTREMITIES:  No clubbing, cyanosis, or edema  PSYCH: Normal mood/affect; answers questions appropriately  NEUROLOGY: AAOx1; no focal deficits  SKIN: No rashes or lesions    LABS:                        10.7   11.4  )-----------( 389      ( 2017 06:14 )             32.6     WBC Trend: 11.4<--, 10.9<--, 12.8<--  11-20    146<H>  |  104  |  56<H>  ----------------------------<  121<H>  4.3   |  23  |  1.67<H>    Ca    9.1      2017 06:14  Phos  3.9     -20  Mg     2.3     -20    Creatinine Trend: 1.67<--, 1.82<--, 1.89<--, 1.91<--, 1.52<--    CARDIAC MARKERS ( 2017 06:34 )  x     / 0.16 ng/mL / x     / x     / x          Urinalysis Basic - ( 2017 00:16 )    Color: PL Yellow / Appearance: Clear / S.011 / pH: x  Gluc: x / Ketone: Negative  / Bili: Negative / Urobili: Negative   Blood: x / Protein: Negative / Nitrite: Negative   Leuk Esterase: Negative / RBC: x / WBC x   Sq Epi: x / Non Sq Epi: x / Bacteria: x    Culture - Blood (17 @ 15:50)    Specimen Source: .Blood Blood    Culture Results:   No growth to date.    Culture - Blood (17 @ 15:50)    Specimen Source: .Blood Blood    Culture Results:   No growth to date.    RADIOLOGY & ADDITIONAL TESTS:    Imaging Personally Reviewed:  CXR: < from: Xray Chest 1 View AP -PORTABLE-Routine (17 @ 08:44) >  Small bilateral pleural effusions and moderate pulmonary   edema, not significantly changed. 1.3 cm nodular opacity in the right   lower lobe, not definitively seen on the prior exam. If warranted, CT   scan of the chest may be performed for further evaluation.    Consultant(s) Notes Reviewed: Pulmonology, Cardiology    Care Discussed with Consultants/Other Providers:    CONTACT #: [id number] Patient is a 87y old  Female who presents with a chief complaint of Shortness of breath, generalized weakness, malaise x 1 week (2017 17:02)    SUBJECTIVE / OVERNIGHT EVENTS:  Patient seen and examined at bedside. No acute events overnight. Still in afib but rate-controlled. Pt is poor historian but denies fever, cough, SOB, CP, palpitations, abd pain, and body aches this AM.    MEDICATIONS  (STANDING):  aspirin enteric coated 81 milliGRAM(s) Oral daily  atorvastatin 10 milliGRAM(s) Oral at bedtime  buDESOnide   0.5 milliGRAM(s) Respule 0.5 milliGRAM(s) Inhalation two times a day  cholecalciferol 2000 Unit(s) Oral daily  diltiazem    Tablet 60 milliGRAM(s) Oral every 6 hours  furosemide   Injectable 40 milliGRAM(s) IV Push daily  heparin  Injectable 5000 Unit(s) SubCutaneous every 8 hours  influenza   Vaccine 0.5 milliLiter(s) IntraMuscular once  levalbuterol Inhalation 0.63 milliGRAM(s) Inhalation every 6 hours  levETIRAcetam 250 milliGRAM(s) Oral daily  montelukast 10 milliGRAM(s) Oral daily  pantoprazole    Tablet 40 milliGRAM(s) Oral before breakfast  tiotropium 18 MICROgram(s) Capsule 1 Capsule(s) Inhalation daily    Vital Signs Last 24 Hrs  T(C): 36.3 (2017 04:47), Max: 36.9 (2017 20:48)  T(F): 97.4 (2017 04:47), Max: 98.4 (2017 20:48)  HR: 97 (2017 04:47) (78 - 97)  BP: 128/75 (2017 04:47) (100/54 - 135/75)  RR: 18 (2017 04:47) (18 - 18)  SpO2: 94% (2017 04:47) (94% - 98%)    I&O's Summary  2017 07:01  -  2017 07:00  --------------------------------------------------------  IN: 180 mL / OUT: 0 mL / NET: 180 mL    2017 07:01  -  2017 06:31  --------------------------------------------------------  IN: 600 mL / OUT: 700 mL / NET: -100 mL    PHYSICAL EXAM:  GENERAL: NAD, laying in bed; breathing comfortably on NC  HEAD: NC/AT  EYES:  PERRLA, conjunctiva and sclera clear  NECK: Supple, No JVD  CHEST/LUNG: Clear to auscultation bilaterally; No wheeze  HEART: Regular rate, irregular rhythm; +SM  ABDOMEN: Soft, Nontender, Nondistended; Bowel sounds present  EXTREMITIES:  No clubbing, cyanosis, or edema  PSYCH: Normal mood/affect; answers questions appropriately  NEUROLOGY: AAOx1; no focal deficits  SKIN: No rashes or lesions    LABS:                      10.7   11.4  )-----------( 389      ( 2017 06:14 )             32.6     WBC Trend: 11.4<--, 10.9<--, 12.8<--  11-20    146<H>  |  104  |  56<H>  ----------------------------<  121<H>  4.3   |  23  |  1.67<H>    Ca    9.1      2017 06:14  Phos  3.9     11-20  Mg     2.3     11-20    Creatinine Trend: 1.67<--, 1.82<--, 1.89<--, 1.91<--, 1.52<--    Urinalysis Basic - ( 2017 00:16 )  Color: PL Yellow / Appearance: Clear / S.011 / pH: x  Gluc: x / Ketone: Negative  / Bili: Negative / Urobili: Negative   Blood: x / Protein: Negative / Nitrite: Negative   Leuk Esterase: Negative / RBC: x / WBC x   Sq Epi: x / Non Sq Epi: x / Bacteria: x    Culture - Blood (17 @ 15:50)    Specimen Source: .Blood Blood    Culture Results:   No growth to date.    Culture - Blood (17 @ 15:50)    Specimen Source: .Blood Blood    Culture Results:   No growth to date.    RADIOLOGY & ADDITIONAL TESTS:    Imaging Personally Reviewed:  CXR: < from: Xray Chest 1 View AP -PORTABLE-Routine (17 @ 08:44) >  Small bilateral pleural effusions and moderate pulmonary   edema, not significantly changed. 1.3 cm nodular opacity in the right   lower lobe, not definitively seen on the prior exam. If warranted, CT   scan of the chest may be performed for further evaluation.    Consultant(s) Notes Reviewed: Pulmonology, Cardiology, Pulmonology    CONTACT #: [id number]

## 2017-11-20 NOTE — PROGRESS NOTE ADULT - PROBLEM SELECTOR PLAN 1
Patient originally p/w SOB x 1 week w/ signs of overload on physical exam, elevated pro-BNP, and CXR concerning for acute HF exacerbation  Given lasix 20 IV x 1 per cards recs  Volume status appears to be improving; Continue with lasix IV 40 daily  Strict I+Os for goal net negative 500cc to 1L daily per cards recs; Will diurese as needed to meet goal  Monitor BMP, keep K above 4 and Mg above 2 per pulm  Daily weights  TTE from 1/2017 showing hyperdynamic LV, EF 75%, mild-moderate AS, severe AR, no segmental wall motion abnormality  Will obtain TTE  Cardiology following Patient originally p/w SOB x 1 week w/ signs of overload on physical exam, elevated pro-BNP, and CXR concerning for acute HF exacerbation  Given lasix 20 IV x 1 per cards recs  Volume status appears to be improving; D/philip IV lasix and started lasix 20 PO per cardiology  Strict I+Os for goal net negative 500cc to 1L daily per cards recs; Will diurese as needed to meet goal  Monitor BMP, keep K above 4 and Mg above 2 per pulm  Daily weights  TTE from 1/2017 showing hyperdynamic LV, EF 75%, mild-moderate AS, severe AR, no segmental wall motion abnormality  Will obtain TTE Patient originally p/w SOB x 1 week w/ signs of overload on physical exam, elevated pro-BNP, and CXR concerning for acute HF exacerbation  Given lasix 20 IV x 1 per cards recs  Volume status appears to be improving; D/philip IV lasix and started lasix 20 PO per cardiology  Strict I+Os for goal net negative 500cc to 1L daily per cards recs; Will diurese as needed to meet goal with close monitoring  Monitor BMP, keep K above 4 and Mg above 2 per pulm  Daily weights  TTE from 1/2017 showing hyperdynamic LV, EF 75%, mild-moderate AS, severe AR, no segmental wall motion abnormality  Will obtain repeat TTE as per cardiology to evaluate valvular disease, with pt +/- candidate for TAVR (given underlying cognitive impairment)

## 2017-11-20 NOTE — PHYSICAL THERAPY INITIAL EVALUATION ADULT - ADDITIONAL COMMENTS
pt lives alone in, elevated apt , 6 steps to enter with rail , assist with ADL's , amb with rolling walker , PTA had [facility] 7.5 hrs a day mon-friday and on weekends 6 hrs sat and sun ; used rolling walker to amb independent

## 2017-11-20 NOTE — PROGRESS NOTE ADULT - PROBLEM SELECTOR PLAN 3
-respiratory symptoms more likely due to CHF than pneumonia  -holding abx for now -respiratory symptoms more likely due to CHF than pneumonia  -holding abx for now  -BCx w/ NGTD, UA unremarkable, RVP negative; F/u UCx

## 2017-11-20 NOTE — PROGRESS NOTE ADULT - PROBLEM SELECTOR PLAN 4
Patient w/ SCr 1.52 on admission (baseline SCr ~1.1); Likely 2/2 hypoperfusion  S/p 750 cc NS; Will not aggressively hydrate w/ IVF for now due to concern for acute HF exacerbation  Trend SCr  Renally dose medications  Avoid nephrotoxins Patient w/ SCr 1.52 on admission (baseline SCr ~1.1); Likely 2/2 hypoperfusion  S/p 750 cc NS; Will not aggressively hydrate w/ IVF for now due to concern for acute HF exacerbation  Trend SCr  Renally dose medications

## 2017-11-20 NOTE — PHYSICAL THERAPY INITIAL EVALUATION ADULT - IMPAIRED TRANSFERS: SIT/STAND, REHAB EVAL
decreased strength/impaired balance/cognition/decreased endurance , mod to mni unsteady/impaired postural control

## 2017-11-20 NOTE — PHYSICAL THERAPY INITIAL EVALUATION ADULT - DISCHARGE DISPOSITION, PT EVAL
rehabilitation facility/PT recommend Subacute rehab , w/pt permission caled dtr Rachel [phone] granddtr answer and report Rachel unable to speak at this time to PT , given RACHEL cell [phone] and PT left PT DEPT number and PT name for call back re discharge planning options

## 2017-11-20 NOTE — PHYSICAL THERAPY INITIAL EVALUATION ADULT - PERTINENT HX OF CURRENT PROBLEM, REHAB EVAL
Glucose 121, CXR small bilateral pleural effusion , pulmonary edema , 1.3 cm nodular opacitiy RLL ; TTE in 1/2017 : EF 75 % ,mild-moderate AS, severe AR

## 2017-11-20 NOTE — PHYSICAL THERAPY INITIAL EVALUATION ADULT - IMPAIRMENTS FOUND, PT EVAL
muscle strength/ventilation and respiration/gas exchange/cognitive impairment/aerobic capacity/endurance/gait, locomotion, and balance

## 2017-11-20 NOTE — PROGRESS NOTE ADULT - ATTENDING COMMENTS
as above--situation is most c/w CHF and not PNA--no signs or sx to support (CXR c/w APE and not PNA)  ABX (ceftriaxone and zithromax)--discontinued (I agree)  restart xopenex/pulmicort/spiriva; continue O2  Repeat CXR today; echo pending.  Cardiology management of AF and diuretics.  Edwin Briones MD-Pulmonary   [phone]

## 2017-11-20 NOTE — PHYSICAL THERAPY INITIAL EVALUATION ADULT - PRECAUTIONS/LIMITATIONS, REHAB EVAL
fall precautions/Pt is an 86 yo F w/ hx of afib (not on AC), CVA, advanced dementia (AAOx1 baseline), epilepsy (on keppra), fall, HTN, and pulmonary disease who presents w/ generalized weakness, malaise, and SOB x 1 week, found to meet SIRS criteria possibly 2/2 PNA, in afib w/ RVR, and w/ signs of overload on physical exam and CXR concerning for acute HF exacerbation. Pt is an 88 yo F w/ hx of afib (not on AC), CVA, advanced dementia (AAOx1 baseline), epilepsy (on keppra), fall, HTN, and pulmonary disease who presents w/ generalized weakness, malaise, and SOB x 1 week, found to meet SIRS criteria possibly 2/2 PNA, in afib w/ RVR, and w/ signs of overload on physical exam and CXR concerning for acute HF exacerbation./fall precautions/oxygen therapy device and L/min Pt is an 88 yo F w/ hx of afib (not on AC), CVA, advanced dementia (AAOx1 baseline), epilepsy (on keppra), fall, HTN, and pulmonary disease who presents w/ generalized weakness, malaise, and SOB x 1 week, found to meet SIRS criteria possibly 2/2 PNA, in afib w/ RVR, and w/ signs of overload on physical exam and CXR concerning for acute HF exacerbation./fall precautions/oxygen therapy device and L/min/seizure precautions/aspiration precautions

## 2017-11-20 NOTE — PROGRESS NOTE ADULT - PROBLEM SELECTOR PLAN 3
Continue cardizem. Start amiodarone 200mg po bid to try to maintain NSR. To discuss dose adjusted NOAC (eg eliquis 2.5mg bid) with family and medicine.

## 2017-11-20 NOTE — PROGRESS NOTE ADULT - SUBJECTIVE AND OBJECTIVE BOX
Patient is a 87y old  Female who presents with a chief complaint of Shortness of breath, generalized weakness, malaise x 1 week (2017 17:02)    She denies c/o chest pain, SOB or palpitations. Lying flat comfortably.    Allergies    No Known Allergies    Intolerances      MEDICATIONS  (STANDING):  aspirin enteric coated 81 milliGRAM(s) Oral daily  atorvastatin 10 milliGRAM(s) Oral at bedtime  buDESOnide   0.5 milliGRAM(s) Respule 0.5 milliGRAM(s) Inhalation two times a day  cholecalciferol 2000 Unit(s) Oral daily  diltiazem    Tablet 60 milliGRAM(s) Oral every 6 hours  furosemide   Injectable 40 milliGRAM(s) IV Push daily  heparin  Injectable 5000 Unit(s) SubCutaneous every 8 hours  influenza   Vaccine 0.5 milliLiter(s) IntraMuscular once  levalbuterol Inhalation 0.63 milliGRAM(s) Inhalation every 6 hours  levETIRAcetam 250 milliGRAM(s) Oral daily  montelukast 10 milliGRAM(s) Oral daily  pantoprazole    Tablet 40 milliGRAM(s) Oral before breakfast  tiotropium 18 MICROgram(s) Capsule 1 Capsule(s) Inhalation daily    MEDICATIONS  (PRN):      PHYSICAL EXAM:  Vital Signs Last 24 Hrs  T(C): 36.3 (2017 04:47), Max: 36.9 (2017 20:48)  T(F): 97.4 (2017 04:47), Max: 98.4 (2017 20:48)  HR: 97 (2017 04:47) (78 - 97)  BP: 128/75 (2017 04:47) (100/54 - 135/75)  BP(mean): --  RR: 18 (2017 04:47) (18 - 18)  SpO2: 94% (2017 04:47) (94% - 98%)  Daily     Daily Weight in k.9 (2017 04:47)  I&O's Summary    2017 07:01  -  2017 07:00  --------------------------------------------------------  IN: 600 mL / OUT: 700 mL / NET: -100 mL        General Appearance: 	 Alert, cooperative, no distress  HEENT: normocephalic, atraumatic  Neck: no JVD,  carotid 2+  bilaterally with bruit vs. transmitted murmur  Lungs:  clear to auscultation and percussion bilaterally  Cor:  pmi 5th ICS MCL,irregular rate and rhythm, S1 normal intensity, S2 decreased intensity, Grade II-III/VI mid to late peaking crescendo decrescendo systolic murmur ULSB  Abdomen: soft, non-tender; bowel sounds normal; no masses,  no organomegaly  Extremities: without cyanosis, clubbing or edema  Vasc: 2-+ PT and DP pulses; LE varicosities    Telemetry: AFIB   Labs:  CBC Full  -  ( 2017 06:14 )  WBC Count : 11.4 K/uL  Hemoglobin : 10.7 g/dL  Hematocrit : 32.6 %  Platelet Count - Automated : 389 K/uL  Mean Cell Volume : 86.4 fl  Mean Cell Hemoglobin : 28.3 pg  Mean Cell Hemoglobin Concentration : 32.8 gm/dL  Auto Neutrophil # : x  Auto Lymphocyte # : x  Auto Monocyte # : x  Auto Eosinophil # : x  Auto Basophil # : x  Auto Neutrophil % : x  Auto Lymphocyte % : x  Auto Monocyte % : x  Auto Eosinophil % : x  Auto Basophil % : x    11-20    146<H>  |  104  |  x   ----------------------------<  x   4.3   |  x   |  x     Ca    9.1      2017 06:58  Phos  4.2       Mg     2.4                 Urinalysis Basic - ( 2017 00:16 )    Color: PL Yellow / Appearance: Clear / S.011 / pH: x  Gluc: x / Ketone: Negative  / Bili: Negative / Urobili: Negative   Blood: x / Protein: Negative / Nitrite: Negative   Leuk Esterase: Negative / RBC: x / WBC x   Sq Epi: x / Non Sq Epi: x / Bacteria: x        Radiology/Imaging:  < from: Xray Chest 1 View AP -PORTABLE-Routine (17 @ 08:44) >    EXAM:  CHEST PORTABLE ROUTINE                            PROCEDURE DATE:  2017            INTERPRETATION:  Indication: Shortness of breath. Signs of volume   overload on exam.    Technique: Single portable view of the chest.    Comparison: 2017    Findings: The heart size cannot be adequately assessed on this single   view. There are bilateral pleural effusions with associated atelectasis.   There is a 1.3 cm nodular opacity in the right lower lobe which was not   definitively seen on the prior exam. There is moderate pulmonary edema.    Impression: Small bilateral pleural effusions and moderate pulmonary   edema, not significantly changed. 1.3 cm nodular opacity in the right   lower lobe, not definitively seen on the prior exam. If warranted, CT   scan of the chest may be performed for further evaluation.    < end of copied text >                      Jake Macias MD [facility]  [phone]

## 2017-11-20 NOTE — PROGRESS NOTE ADULT - SUBJECTIVE AND OBJECTIVE BOX
CHIEF COMPLAINT: good spirits--no coughing or sob    Interval Events: none    REVIEW OF SYSTEMS:  Constitutional: No fevers or chills. No weight loss. No fatigue or generalized malaise.  Eyes: No itching or discharge from the eyes  ENT: No ear pain. No ear discharge. No nasal congestion. No post nasal drip. No epistaxis. No throat pain. No sore throat. No difficulty swallowing.   CV: No chest pain. No palpitations. No lightheadedness or dizziness.   Resp: No dyspnea at rest. No dyspnea on exertion. No orthopnea. No wheezing. No cough. No stridor. No sputum production. No chest pain with respiration.  GI: No nausea. No vomiting. No diarrhea.  MSK: No joint pain or pain in any extremities  Integumentary: No skin lesions. No pedal edema.  Neurological: No gross motor weakness. No sensory changes.  [+ ] All other systems negative  [ ] Unable to assess ROS because ________    OBJECTIVE:  ICU Vital Signs Last 24 Hrs  T(C): 36.3 (2017 04:47), Max: 36.9 (2017 20:48)  T(F): 97.4 (2017 04:47), Max: 98.4 (2017 20:48)  HR: 97 (2017 04:47) (78 - 97)  BP: 128/75 (2017 04:47) (100/54 - 135/75)  BP(mean): --  ABP: --  ABP(mean): --  RR: 18 (2017 04:47) (18 - 18)  SpO2: 94% (2017 04:47) (94% - 98%)         @ :  -   @ 07:00  --------------------------------------------------------  IN: 180 mL / OUT: 0 mL / NET: 180 mL     @ 07:  -   @ 05:31  --------------------------------------------------------  IN: 480 mL / OUT: 350 mL / NET: 130 mL      CAPILLARY BLOOD GLUCOSE          PHYSICAL EXAM: NAD in bed  General: Awake, alert, oriented X 3.   HEENT: Atraumatic, normocephalic.                 Mallampatti Grade 2                No nasal congestion.                No tonsillar or pharyngeal exudates.  Lymph Nodes: No palpable lymphadenopathy  Neck: No JVD. No carotid bruit.   Respiratory: Normal chest expansion                         Normal percussion                         Normal and equal air entry                         No wheeze, rhonchi or rales.  Cardiovascular: S1 S2 normal. 2+ murmurs,no rubs or gallops.   Abdomen: Soft, non-tender, non-distended. No organomegaly.  Extremities: Warm to touch. Peripheral pulse palpable. No pedal edema.   Skin: No rashes or skin lesions  Neurological: Motor and sensory examination equal and normal in all four extremities.  Psychiatry: Appropriate mood and affect.    HOSPITAL MEDICATIONS:  MEDICATIONS  (STANDING):  aspirin enteric coated 81 milliGRAM(s) Oral daily  atorvastatin 10 milliGRAM(s) Oral at bedtime  buDESOnide   0.5 milliGRAM(s) Respule 0.5 milliGRAM(s) Inhalation two times a day  cholecalciferol 2000 Unit(s) Oral daily  diltiazem    Tablet 60 milliGRAM(s) Oral every 6 hours  furosemide   Injectable 40 milliGRAM(s) IV Push daily  heparin  Injectable 5000 Unit(s) SubCutaneous every 8 hours  influenza   Vaccine 0.5 milliLiter(s) IntraMuscular once  levalbuterol Inhalation 0.63 milliGRAM(s) Inhalation every 6 hours  levETIRAcetam 250 milliGRAM(s) Oral daily  montelukast 10 milliGRAM(s) Oral daily  pantoprazole    Tablet 40 milliGRAM(s) Oral before breakfast  tiotropium 18 MICROgram(s) Capsule 1 Capsule(s) Inhalation daily    MEDICATIONS  (PRN):      LABS:                        10.5   10.9  )-----------( 327      ( 2017 06:58 )             32.7         140  |  105  |  62<H>  ----------------------------<  152<H>  4.2   |  19<L>  |  1.82<H>    Ca    9.1      2017 06:58  Phos  4.2     -  Mg     2.4         TPro  7.7  /  Alb  4.0  /  TBili  0.4  /  DBili  x   /  AST  193<H>  /  ALT  184<H>  /  AlkPhos  96  -      Urinalysis Basic - ( 2017 00:16 )    Color: PL Yellow / Appearance: Clear / S.011 / pH: x  Gluc: x / Ketone: Negative  / Bili: Negative / Urobili: Negative   Blood: x / Protein: Negative / Nitrite: Negative   Leuk Esterase: Negative / RBC: x / WBC x   Sq Epi: x / Non Sq Epi: x / Bacteria: x            MICROBIOLOGY:     RADIOLOGY:  [ ] Reviewed and interpreted by me    Point of Care Ultrasound Findings:    PFT:    EKG:

## 2017-11-20 NOTE — PHYSICAL THERAPY INITIAL EVALUATION ADULT - GENERAL OBSERVATIONS, REHAB EVAL
pt received in bed nad + 4 L nc O2 off O2 at rest pt pulse ox 86 % ,pt placed back on 4 L nc o2 humidified pt 93 % ,pt denies SOB or any other complaint ;pt received all siderails up hob 30 + bed alarm active ;pt in enhanced supervision room

## 2017-11-20 NOTE — PHYSICAL THERAPY INITIAL EVALUATION ADULT - TRANSFER SAFETY CONCERNS NOTED: SIT/STAND, REHAB EVAL
decreased balance during turns/losing balance/decreased step length/decreased weight-shifting ability

## 2017-11-21 DIAGNOSIS — I50.33 ACUTE ON CHRONIC DIASTOLIC (CONGESTIVE) HEART FAILURE: ICD-10-CM

## 2017-11-21 DIAGNOSIS — I35.0 NONRHEUMATIC AORTIC (VALVE) STENOSIS: ICD-10-CM

## 2017-11-21 DIAGNOSIS — I35.9 NONRHEUMATIC AORTIC VALVE DISORDER, UNSPECIFIED: ICD-10-CM

## 2017-11-21 DIAGNOSIS — I21.4 NON-ST ELEVATION (NSTEMI) MYOCARDIAL INFARCTION: ICD-10-CM

## 2017-11-21 DIAGNOSIS — I50.31 ACUTE DIASTOLIC (CONGESTIVE) HEART FAILURE: ICD-10-CM

## 2017-11-21 DIAGNOSIS — I35.1 NONRHEUMATIC AORTIC (VALVE) INSUFFICIENCY: ICD-10-CM

## 2017-11-21 DIAGNOSIS — R93.8 ABNORMAL FINDINGS ON DIAGNOSTIC IMAGING OF OTHER SPECIFIED BODY STRUCTURES: ICD-10-CM

## 2017-11-21 LAB
ANION GAP SERPL CALC-SCNC: 18 MMOL/L — HIGH (ref 5–17)
BUN SERPL-MCNC: 49 MG/DL — HIGH (ref 7–23)
CALCIUM SERPL-MCNC: 9.3 MG/DL — SIGNIFICANT CHANGE UP (ref 8.4–10.5)
CHLORIDE SERPL-SCNC: 102 MMOL/L — SIGNIFICANT CHANGE UP (ref 96–108)
CO2 SERPL-SCNC: 24 MMOL/L — SIGNIFICANT CHANGE UP (ref 22–31)
CREAT SERPL-MCNC: 1.64 MG/DL — HIGH (ref 0.5–1.3)
CULTURE RESULTS: SIGNIFICANT CHANGE UP
GLUCOSE SERPL-MCNC: 132 MG/DL — HIGH (ref 70–99)
HCT VFR BLD CALC: 36.7 % — SIGNIFICANT CHANGE UP (ref 34.5–45)
HGB BLD-MCNC: 11.6 G/DL — SIGNIFICANT CHANGE UP (ref 11.5–15.5)
MAGNESIUM SERPL-MCNC: 2.3 MG/DL — SIGNIFICANT CHANGE UP (ref 1.6–2.6)
MCHC RBC-ENTMCNC: 27.6 PG — SIGNIFICANT CHANGE UP (ref 27–34)
MCHC RBC-ENTMCNC: 31.7 GM/DL — LOW (ref 32–36)
MCV RBC AUTO: 87.1 FL — SIGNIFICANT CHANGE UP (ref 80–100)
PHOSPHATE SERPL-MCNC: 3.6 MG/DL — SIGNIFICANT CHANGE UP (ref 2.5–4.5)
PLATELET # BLD AUTO: 437 K/UL — HIGH (ref 150–400)
POTASSIUM SERPL-MCNC: 4.5 MMOL/L — SIGNIFICANT CHANGE UP (ref 3.5–5.3)
POTASSIUM SERPL-SCNC: 4.5 MMOL/L — SIGNIFICANT CHANGE UP (ref 3.5–5.3)
RBC # BLD: 4.21 M/UL — SIGNIFICANT CHANGE UP (ref 3.8–5.2)
RBC # FLD: 15 % — HIGH (ref 10.3–14.5)
SODIUM SERPL-SCNC: 144 MMOL/L — SIGNIFICANT CHANGE UP (ref 135–145)
SPECIMEN SOURCE: SIGNIFICANT CHANGE UP
WBC # BLD: 11.5 K/UL — HIGH (ref 3.8–10.5)
WBC # FLD AUTO: 11.5 K/UL — HIGH (ref 3.8–10.5)

## 2017-11-21 PROCEDURE — 99233 SBSQ HOSP IP/OBS HIGH 50: CPT | Mod: GC

## 2017-11-21 PROCEDURE — 99233 SBSQ HOSP IP/OBS HIGH 50: CPT

## 2017-11-21 PROCEDURE — 71250 CT THORAX DX C-: CPT | Mod: 26

## 2017-11-21 RX ORDER — DOCUSATE SODIUM 100 MG
100 CAPSULE ORAL DAILY
Qty: 0 | Refills: 0 | Status: DISCONTINUED | OUTPATIENT
Start: 2017-11-21 | End: 2017-11-27

## 2017-11-21 RX ORDER — SENNA PLUS 8.6 MG/1
2 TABLET ORAL AT BEDTIME
Qty: 0 | Refills: 0 | Status: DISCONTINUED | OUTPATIENT
Start: 2017-11-21 | End: 2017-11-27

## 2017-11-21 RX ORDER — AMIODARONE HYDROCHLORIDE 400 MG/1
400 TABLET ORAL EVERY 8 HOURS
Qty: 0 | Refills: 0 | Status: COMPLETED | OUTPATIENT
Start: 2017-11-21 | End: 2017-11-24

## 2017-11-21 RX ORDER — METOPROLOL TARTRATE 50 MG
5 TABLET ORAL ONCE
Qty: 0 | Refills: 0 | Status: COMPLETED | OUTPATIENT
Start: 2017-11-21 | End: 2017-11-21

## 2017-11-21 RX ORDER — APIXABAN 2.5 MG/1
2.5 TABLET, FILM COATED ORAL EVERY 12 HOURS
Qty: 0 | Refills: 0 | Status: DISCONTINUED | OUTPATIENT
Start: 2017-11-21 | End: 2017-11-27

## 2017-11-21 RX ORDER — AMIODARONE HYDROCHLORIDE 400 MG/1
200 TABLET ORAL DAILY
Qty: 0 | Refills: 0 | Status: COMPLETED | OUTPATIENT
Start: 2017-11-25 | End: 2018-10-24

## 2017-11-21 RX ADMIN — AMIODARONE HYDROCHLORIDE 400 MILLIGRAM(S): 400 TABLET ORAL at 07:30

## 2017-11-21 RX ADMIN — LEVETIRACETAM 250 MILLIGRAM(S): 250 TABLET, FILM COATED ORAL at 13:27

## 2017-11-21 RX ADMIN — Medication 100 MILLIGRAM(S): at 04:12

## 2017-11-21 RX ADMIN — LEVALBUTEROL 0.63 MILLIGRAM(S): 1.25 SOLUTION, CONCENTRATE RESPIRATORY (INHALATION) at 13:26

## 2017-11-21 RX ADMIN — Medication 20 MILLIGRAM(S): at 04:11

## 2017-11-21 RX ADMIN — ATORVASTATIN CALCIUM 10 MILLIGRAM(S): 80 TABLET, FILM COATED ORAL at 21:10

## 2017-11-21 RX ADMIN — Medication 5 MILLIGRAM(S): at 17:15

## 2017-11-21 RX ADMIN — AMIODARONE HYDROCHLORIDE 400 MILLIGRAM(S): 400 TABLET ORAL at 13:35

## 2017-11-21 RX ADMIN — LEVALBUTEROL 0.63 MILLIGRAM(S): 1.25 SOLUTION, CONCENTRATE RESPIRATORY (INHALATION) at 17:30

## 2017-11-21 RX ADMIN — LEVALBUTEROL 0.63 MILLIGRAM(S): 1.25 SOLUTION, CONCENTRATE RESPIRATORY (INHALATION) at 04:12

## 2017-11-21 RX ADMIN — Medication 2000 UNIT(S): at 13:25

## 2017-11-21 RX ADMIN — TIOTROPIUM BROMIDE 1 CAPSULE(S): 18 CAPSULE ORAL; RESPIRATORY (INHALATION) at 13:28

## 2017-11-21 RX ADMIN — SENNA PLUS 2 TABLET(S): 8.6 TABLET ORAL at 21:10

## 2017-11-21 RX ADMIN — Medication 0.5 MILLIGRAM(S): at 04:12

## 2017-11-21 RX ADMIN — MONTELUKAST 10 MILLIGRAM(S): 4 TABLET, CHEWABLE ORAL at 13:27

## 2017-11-21 RX ADMIN — HEPARIN SODIUM 5000 UNIT(S): 5000 INJECTION INTRAVENOUS; SUBCUTANEOUS at 04:12

## 2017-11-21 RX ADMIN — PANTOPRAZOLE SODIUM 40 MILLIGRAM(S): 20 TABLET, DELAYED RELEASE ORAL at 04:12

## 2017-11-21 RX ADMIN — Medication 81 MILLIGRAM(S): at 13:25

## 2017-11-21 RX ADMIN — Medication 0.5 MILLIGRAM(S): at 17:28

## 2017-11-21 RX ADMIN — APIXABAN 2.5 MILLIGRAM(S): 2.5 TABLET, FILM COATED ORAL at 17:28

## 2017-11-21 RX ADMIN — HEPARIN SODIUM 5000 UNIT(S): 5000 INJECTION INTRAVENOUS; SUBCUTANEOUS at 13:27

## 2017-11-21 RX ADMIN — AMIODARONE HYDROCHLORIDE 400 MILLIGRAM(S): 400 TABLET ORAL at 21:10

## 2017-11-21 RX ADMIN — Medication 120 MILLIGRAM(S): at 04:12

## 2017-11-21 NOTE — CONSULT NOTE ADULT - SUBJECTIVE AND OBJECTIVE BOX
HPI:    The patient is an 88 y/o female, who was brought into the hospital for SOB and rapid AFib. She lives at home with a part time HHA. She is able to walk on her own and dress herself, but often doesn't shower due to apathetic feeling towards personal hygeine as per daughter. She has significant Dementia, oriented to person only. She denies any issues at this time, but the patient is a poor historian. Most of the history was gotten from the daughter, who was at bedside. She reports that as of late, her mother has needed to sleep in a near seated position, and has noticed her getting more fatigued with any ambulation. She does not leave the house much to go and visit friends or family. She is currently a fall risk, and is a DNR.  Her minimal STS 30 day mortality risk score for AVR is around 8.9%, placing her in a high risk surgical category. This would likely increase once the results of further testing are looked at.        PAST MEDICAL & SURGICAL HISTORY:  Atrial fibrillation  Dementia  Pulmonary disease  Fall  CVA (cerebral vascular accident)  HTN (hypertension)  Epilepsy  S/P Hysterectomy      No Known Allergies    amiodarone    Tablet 400 milliGRAM(s) Oral every 8 hours  aspirin enteric coated 81 milliGRAM(s) Oral daily  atorvastatin 10 milliGRAM(s) Oral at bedtime  buDESOnide   0.5 milliGRAM(s) Respule 0.5 milliGRAM(s) Inhalation two times a day  cholecalciferol 2000 Unit(s) Oral daily  diltiazem    milliGRAM(s) Oral daily  furosemide    Tablet 20 milliGRAM(s) Oral daily  heparin  Injectable 5000 Unit(s) SubCutaneous every 8 hours  influenza   Vaccine 0.5 milliLiter(s) IntraMuscular once  levalbuterol Inhalation 0.63 milliGRAM(s) Inhalation every 6 hours  levETIRAcetam 250 milliGRAM(s) Oral daily  montelukast 10 milliGRAM(s) Oral daily  pantoprazole    Tablet 40 milliGRAM(s) Oral before breakfast  tiotropium 18 MICROgram(s) Capsule 1 Capsule(s) Inhalation daily      T(C): 37 (11-21-17 @ 04:01), Max: 37 (11-21-17 @ 04:01)  HR: 98 (11-21-17 @ 04:01) (92 - 98)  BP: 142/73 (11-21-17 @ 04:01) (128/69 - 142/73)  RR: 20 (11-21-17 @ 04:01) (20 - 20)  SpO2: 94% (11-21-17 @ 04:01) (94% - 95%)  Wt(kg): --    11-20 @ 07:01  -  11-21 @ 07:00  --------------------------------------------------------  IN: 540 mL / OUT: 500 mL / NET: 40 mL    11-21 @ 07:01  -  11-21 @ 13:56  --------------------------------------------------------  IN: 240 mL / OUT: 0 mL / NET: 240 mL        REVIEW OF SYSTEMS      General: Alert to person only	  Respiratory and Thorax: Non productive cough	  Cardiovascular:	Denies CP	  Neurological:	Dementia  	      PHYSICAL EXAM:    Constitutional: Alert to person only  Respiratory: + Non productive cough, Diminshed at bases. Nasal Cannula @ 2 LPM  Cardiovascular: S1S2, RRR, III/VI Diastolic Murmur  Gastrointestinal: Soft, NT/ND, + Bowel Sounds  Extremities: 1+ Pulses, No Edema Noted  Vascular:  No Bruits,  Neurological: Confused, Motor/Sensory = B/L      Laboratory:                        11.6   11.5  )-----------( 437      ( 21 Nov 2017 05:45 )             36.7    11-21    144  |  102  |  49<H>  ----------------------------<  132<H>  4.5   |  24  |  1.64<H>    Ca    9.3      21 Nov 2017 05:45  Phos  3.6     11-21  Mg     2.3     11-21         Pro-BNP: 98312 pg/dL    Echo:      Cardiac < from: Transthoracic Echocardiogram (11.20.17 @ 13:20) >  Dimensions:    Normal Values:  LA:     3.9    2.0 - 4.0 cm  Ao:     2.2    2.0 - 3.8 cm  SEPTUM: 1.1    0.6 - 1.2 cm  PWT:    1.0    0.6 - 1.1 cm  LVIDd:  3.5    3.0 - 5.6 cm  LVIDs:  2.0    1.8 - 4.0 cm  Derived variables:  LVMI: 70 g/m2  RWT: 0.57  Fractional short: 43 %  Doppler Peak Velocity (m/sec): AoV=3.7  ------------------------------------------------------------------------  Observations:  Mitral Valve: Mitral annular calcification and calcified  mitral leaflets with decreased diastolic opening. Moderate  mitral regurgitation. Mean transmitral valve gradient  equals 6 mm Hg, estimated mitral valve area equals 1.9 sqcm  (by pressure half time equation), consistent with mild  mitral stenosis.  Aortic Valve/Aorta: Calcified trileaflet aortic valve with  decreased opening. Peak transaortic valve gradient equals  55 mm Hg, mean transaortic valve gradient equals 27 mm Hg,  estimated aortic valve area equals 1 sqcm (by continuity  equation), aortic valve velocity time integral equals 66  cm, consistent with moderate-severe aortic stenosis. The  transaortic gradient may be elevated due to aortic  regurgitation, which  would overestimate severity of aortic  stenosis. Severe aortic regurgitation. Peak left  ventricular outflow tract gradient equals 9 mm Hg, mean  gradient is equal to 5 mm Hg,LVOT velocity time integral  equals 27 cm.  Aortic Root: 2.2 cm.  LVOT diameter: 1.8 cm.  Left Atrium: Mildly dilated left atrium.  LA volume index =  38 cc/m2.  Left Ventricle: Hyperdynamic left ventricular systolic  function. Increased relative wall thickness with normal  left ventricular mass index, consistent with concentric  left ventricular remodeling.  Right Heart: Normal right atrium. Normal right ventricular  size and function. Normal tricuspid valve. Mild tricuspid  regurgitation. Normal pulmonic valve. Mild-moderate  pulmonic regurgitation.  Pericardium/Pleura: Normal pericardium with no pericardial  effusion.  Right pleural effusion.  Hemodynamic: Estimated right atrial pressure is 8 mm Hg.  Estimated right ventricular systolic pressure equals 26 mm  Hg, assuming right atrial pressure equals 8 mm Hg,  consistent with normal pulmonary pressures.    < end of copied text >

## 2017-11-21 NOTE — PROGRESS NOTE ADULT - PROBLEM SELECTOR PLAN 5
Patient w/ troponin 0.11 on admission, likely 2/2 demand ischemia (Type II NSTEMI); ACS less likely given no ischemic changes on EKG  Second troponin 0.12, third troponin 0.16  C/w home atorvastatin and asa 81 daily On admission.  Patient w/ troponin 0.11, likely 2/2 demand ischemia (Type II NSTEMI); ACS less likely given no ischemic changes on EKG  Second troponin 0.12, third troponin 0.16  C/w home atorvastatin and asa 81 daily

## 2017-11-21 NOTE — CONSULT NOTE ADULT - PROBLEM SELECTOR RECOMMENDATION 2
Diurese as needed, monitor I/O's
AF now in NSR--as per cards-rate control  diurese as able, keep k above 4 and mg above 2  daily weights; keep input equal to or less than output

## 2017-11-21 NOTE — CONSULT NOTE ADULT - PROBLEM SELECTOR RECOMMENDATION 3
Fall precautions, geriatric follow up.
xopenex .63 via HHN q 6h  pulmicort .5 via HHN q 12 h  spiriva 1 puff q day  singulair 10 mg qhs  pulmonary toilet-incentive spirometry, Chest Pt-acapella/chest vest-up to 5 times per day.  O2 2 liters via nc    maintain oxygen levels above 90%-supplemental oxygen via nasal canula-humidified    All nebulized therapy is to be administered via hand held nebulizer-(patient must gargle and spit with water after use)

## 2017-11-21 NOTE — CONSULT NOTE ADULT - PROBLEM SELECTOR RECOMMENDATION 9
Patient off Anticoagulation due to fall risk. Continue Amiodarone.
multifactorial-copd, chf-AF, poor mechanics of breathing, anxiety

## 2017-11-21 NOTE — PROGRESS NOTE ADULT - SUBJECTIVE AND OBJECTIVE BOX
CHIEF COMPLAINT: good--no sob or chest pain, good sleep    Interval Events: none    REVIEW OF SYSTEMS:  Constitutional: No fevers or chills. No weight loss. No fatigue or generalized malaise.  Eyes: No itching or discharge from the eyes  ENT: No ear pain. No ear discharge. No nasal congestion. No post nasal drip. No epistaxis. No throat pain. No sore throat. No difficulty swallowing.   CV: No chest pain. No palpitations. No lightheadedness or dizziness.   Resp: No dyspnea at rest. No dyspnea on exertion. No orthopnea. No wheezing. No cough. No stridor. No sputum production. No chest pain with respiration.  GI: No nausea. No vomiting. No diarrhea.  MSK: No joint pain or pain in any extremities  Integumentary: No skin lesions. No pedal edema.  Neurological: No gross motor weakness. No sensory changes.  [+ ] All other systems negative  [ ] Unable to assess ROS because ________    OBJECTIVE:  ICU Vital Signs Last 24 Hrs  T(C): 37 (2017 04:01), Max: 37 (2017 04:01)  T(F): 98.6 (2017 04:01), Max: 98.6 (2017 04:01)  HR: 98 (2017 04:) (81 - 98)  BP: 142/73 (2017 04:01) (122/65 - 142/73)  BP(mean): --  ABP: --  ABP(mean): --  RR: 20 (2017 04:) (18 - 20)  SpO2: 94% (2017 04:) (93% - 95%)         @ :  -   @ 07:00  --------------------------------------------------------  IN: 600 mL / OUT: 700 mL / NET: -100 mL     @ 07:  -   @ 05:36  --------------------------------------------------------  IN: 540 mL / OUT: 500 mL / NET: 40 mL      CAPILLARY BLOOD GLUCOSE          PHYSICAL EXAM: nad in bed on O2 -NC  General: Awake, alert, oriented X 3.   HEENT: Atraumatic, normocephalic.                 Mallampatti Grade 2                No nasal congestion.                No tonsillar or pharyngeal exudates.  Lymph Nodes: No palpable lymphadenopathy  Neck: No JVD. No carotid bruit.   Respiratory: Normal chest expansion                         Normal percussion                         Normal and equal air entry                         mild exp wheeze,no rhonchi or rales.  Cardiovascular: S1 S2 normal. No murmurs, rubs or gallops.   Abdomen: Soft, non-tender, non-distended. No organomegaly.  Extremities: Warm to touch. Peripheral pulse palpable. No pedal edema.   Skin: No rashes or skin lesions  Neurological: Motor and sensory examination equal and normal in all four extremities.  Psychiatry: Appropriate mood and affect.    HOSPITAL MEDICATIONS:  MEDICATIONS  (STANDING):  amiodarone    Tablet 200 milliGRAM(s) Oral two times a day  aspirin enteric coated 81 milliGRAM(s) Oral daily  atorvastatin 10 milliGRAM(s) Oral at bedtime  buDESOnide   0.5 milliGRAM(s) Respule 0.5 milliGRAM(s) Inhalation two times a day  cholecalciferol 2000 Unit(s) Oral daily  diltiazem    milliGRAM(s) Oral daily  furosemide    Tablet 20 milliGRAM(s) Oral daily  heparin  Injectable 5000 Unit(s) SubCutaneous every 8 hours  influenza   Vaccine 0.5 milliLiter(s) IntraMuscular once  levalbuterol Inhalation 0.63 milliGRAM(s) Inhalation every 6 hours  levETIRAcetam 250 milliGRAM(s) Oral daily  montelukast 10 milliGRAM(s) Oral daily  pantoprazole    Tablet 40 milliGRAM(s) Oral before breakfast  tiotropium 18 MICROgram(s) Capsule 1 Capsule(s) Inhalation daily    MEDICATIONS  (PRN):  docusate sodium 100 milliGRAM(s) Oral daily PRN Constipation  senna 2 Tablet(s) Oral at bedtime PRN Constipation      LABS:                        10.7   11.4  )-----------( 389      ( 2017 06:14 )             32.6     11-20    146<H>  |  104  |  56<H>  ----------------------------<  121<H>  4.3   |  23  |  1.67<H>    Ca    9.1      2017 06:14  Phos  3.9     11-20  Mg     2.3     11-20        Urinalysis Basic - ( 2017 00:16 )    Color: PL Yellow / Appearance: Clear / S.011 / pH: x  Gluc: x / Ketone: Negative  / Bili: Negative / Urobili: Negative   Blood: x / Protein: Negative / Nitrite: Negative   Leuk Esterase: Negative / RBC: x / WBC x   Sq Epi: x / Non Sq Epi: x / Bacteria: x            MICROBIOLOGY:     RADIOLOGY:  [ ] Reviewed and interpreted by me    Point of Care Ultrasound Findings:    PFT:    EKG:

## 2017-11-21 NOTE — PROGRESS NOTE ADULT - SUBJECTIVE AND OBJECTIVE BOX
Patient is a 87y old  Female who presents with a chief complaint of Shortness of breath, generalized weakness, malaise x 1 week (2017 17:02)    She remains confused. She denies c/o chest pain, SOB or palpitations but is coughing with audible rhonchi.    Allergies    No Known Allergies    Intolerances      MEDICATIONS  (STANDING):  amiodarone    Tablet 200 milliGRAM(s) Oral two times a day  aspirin enteric coated 81 milliGRAM(s) Oral daily  atorvastatin 10 milliGRAM(s) Oral at bedtime  buDESOnide   0.5 milliGRAM(s) Respule 0.5 milliGRAM(s) Inhalation two times a day  cholecalciferol 2000 Unit(s) Oral daily  diltiazem    milliGRAM(s) Oral daily  furosemide    Tablet 20 milliGRAM(s) Oral daily  heparin  Injectable 5000 Unit(s) SubCutaneous every 8 hours  influenza   Vaccine 0.5 milliLiter(s) IntraMuscular once  levalbuterol Inhalation 0.63 milliGRAM(s) Inhalation every 6 hours  levETIRAcetam 250 milliGRAM(s) Oral daily  montelukast 10 milliGRAM(s) Oral daily  pantoprazole    Tablet 40 milliGRAM(s) Oral before breakfast  tiotropium 18 MICROgram(s) Capsule 1 Capsule(s) Inhalation daily    MEDICATIONS  (PRN):  docusate sodium 100 milliGRAM(s) Oral daily PRN Constipation  senna 2 Tablet(s) Oral at bedtime PRN Constipation      PHYSICAL EXAM:  Vital Signs Last 24 Hrs  T(C): 37 (2017 04:01), Max: 37 (2017 04:01)  T(F): 98.6 (2017 04:01), Max: 98.6 (2017 04:01)  HR: 98 (2017 04:01) (81 - 98)  BP: 142/73 (2017 04:01) (122/65 - 142/73)  BP(mean): --  RR: 20 (2017 04:01) (18 - 20)  SpO2: 94% (2017 04:01) (93% - 95%)  Daily     Daily Weight in k.6 (2017 04:01)  I&O's Summary    2017 07:01  -  2017 07:00  --------------------------------------------------------  IN: 540 mL / OUT: 500 mL / NET: 40 mL    General Appearance: 	awake   HEENT: normocephalic, atraumatic  Neck: no JVD,  carotid 2+  bilaterally with bruit vs. transmitted murmur  Lungs:  scattered rhonchi bilaterally  Cor:  pmi 5th ICS MCL,irregular rate and rhythm, S1 normal intensity, S2 decreased intensity, Grade II-III/VI mid to late peaking crescendo decrescendo systolic murmur ULSB. Grade II/Vi diastolic murmur ULSB  Abdomen: soft, non-tender; bowel sounds normal; no masses,  no organomegaly  Extremities: without cyanosis, clubbing or edema  Vasc: 2-+ PT and DP pulses; LE varicosities      [id number]    Labs:  CBC Full  -  ( 2017 05:45 )  WBC Count : 11.5 K/uL  Hemoglobin : 11.6 g/dL  Hematocrit : 36.7 %  Platelet Count - Automated : 437 K/uL  Mean Cell Volume : 87.1 fl  Mean Cell Hemoglobin : 27.6 pg  Mean Cell Hemoglobin Concentration : 31.7 gm/dL  Auto Neutrophil # : x  Auto Lymphocyte # : x  Auto Monocyte # : x  Auto Eosinophil # : x  Auto Basophil # : x  Auto Neutrophil % : x  Auto Lymphocyte % : x  Auto Monocyte % : x  Auto Eosinophil % : x  Auto Basophil % : x    11-20    146<H>  |  104  |  56<H>  ----------------------------<  121<H>  4.3   |  23  |  1.67<H>    Ca    9.1      2017 06:14  Phos  3.9     11-20  Mg     2.3     11-20      Troponin T, Serum (17 @ 06:34)    Troponin T, Serum: 0.16: Reference Interval for Troponin T  Less than 0.06 ng/mL - includes the 99th percentile of a healthy  population at a method C.V. of 10% or less.  NOTE: Troponin T is measured by the Roche ECLIA method and these  results are not interchangable with Troponin I results since they are  different assays with different reference intervals. ng/mL      Urinalysis Basic - ( 2017 00:16 )    Color: PL Yellow / Appearance: Clear / S.011 / pH: x  Gluc: x / Ketone: Negative  / Bili: Negative / Urobili: Negative   Blood: x / Protein: Negative / Nitrite: Negative   Leuk Esterase: Negative / RBC: x / WBC x   Sq Epi: x / Non Sq Epi: x / Bacteria: x        Radiology/Imaging:    < from: Transthoracic Echocardiogram (17 @ 13:20) >  Patient name: LIBAN RIVER  YOB: 1930   Age: 87 (F)   MR#: [id number]  Study Date: 2017  [facility]  Study quality: Technically difficult  Referring Physician: Juvencio Lamas MD  Blood Pressure: 141/66 mmHg  Height: 160 cm  Weight: 57 kg  BSA: 1.6 m2  ------------------------------------------------------------------------  PROCEDURE: Transthoracic echocardiogram with 2-D, M-Mode  and complete spectral and color flow Doppler. Strain  Imaging.  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 Moderate  mitral regurgitation. Mean transmitral valve gradient  equals 6 mm Hg, estimated mitral valve area equals 1.9 sqcm  (by pressure half time equation), consistent with mild  mitral stenosis.  Aortic Valve/Aorta: Calcified trileaflet aortic valve with  decreased opening. Peak transaortic valve gradient equals  55 mm Hg, mean transaortic valve gradient equals 27 mm Hg,  estimated aortic valve area equals 1 sqcm (by continuity  equation), aortic valve velocity time integral equals 66  cm, consistent with moderate-severe aortic stenosis. The  transaortic gradient may be elevated due to aortic  regurgitation, which  would overestimate severity of aortic  stenosis. Severe aortic regurgitation. Peak left  ventricular outflow tract gradient equals 9 mm Hg, mean  gradient is equal to 5 mm Hg,LVOT velocity time integral  equals 27 cm.  Aortic Root: 2.2 cm.  LVOT diameter: 1.8 cm.  Left Atrium: Mildly dilated left atrium.  LA volume index =  38 cc/m2.  Left Ventricle: Hyperdynamic left ventricular systolic  function. Increased relative wall thickness with normal  left ventricular mass index, consistent with concentric  left ventricular remodeling.  Right Heart: Normal right atrium. Normal right ventricular  size and function. Normal tricuspid valve. Mild tricuspid    < end of copied text >            < from: Transthoracic Echocardiogram (17 @ 13:20) >  Patient name: LIBAN RIVER  YOB: 1930   Age: 87 (F)   MR#: [id number]  Study Date: 2017  [facility]  Study quality: Technically difficult  Referring Physician: Juvencio Lamas MD  Blood Pressure: 141/66 mmHg  Height: 160 cm  Weight: 57 kg  BSA: 1.6 m2  ------------------------------------------------------------------------  PROCEDURE: Transthoracic echocardiogram with 2-D, M-Mode  and complete spectral and color flow Doppler. Strain  Imaging.  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 Moderate  mitral regurgitation. Mean transmitral valve gradient  equals 6 mm Hg, estimated mitral valve area equals 1.9 sqcm  (by pressure half time equation), consistent with mild  mitral stenosis.  Aortic Valve/Aorta: Calcified trileaflet aortic valve with  decreased opening. Peak transaortic valve gradient equals  55 mm Hg, mean transaortic valve gradient equals 27 mm Hg,  estimated aortic valve area equals 1 sqcm (by continuity  equation), aortic valve velocity time integral equals 66  cm, consistent with moderate-severe aortic stenosis. The  transaortic gradient may be elevated due to aortic  regurgitation, which  would overestimate severity of aortic  stenosis. Severe aortic regurgitation. Peak left  ventricular outflow tract gradient equals 9 mm Hg, mean  gradient is equal to 5 mm Hg,LVOT velocity time integral  equals 27 cm.  Aortic Root: 2.2 cm.  LVOT diameter: 1.8 cm.  Left Atrium: Mildly dilated left atrium.  LA volume index =  38 cc/m2.  Left Ventricle: Hyperdynamic left ventricular systolic  function. Increased relative wall thickness with normal  left ventricular mass index, consistent with concentric  left ventricular remodeling.  Right Heart: Normal right atrium. Normal right ventricular  size and function. Normal tricuspid valve. Mild tricuspid    < end of copied text >      Jake Macias MD [facility]  [phone]

## 2017-11-21 NOTE — PROGRESS NOTE ADULT - PROBLEM SELECTOR PLAN 2
Patient w/ hx of afib (not on AC), found to be in afib w/ RVR w/ HR up to 150s on assessment by EMS  -during this admission, patient has been in and out of sinus; ?tachy-portia syndrome  -c/w cardizem 120 qd per cardiology  -c/w amiodarone 200 bid per cardiology  -to discuss dose-adjusted NOAC w/ pt's family and cardiology (as pt does have a history of falls)  -continue tele monitoring Patient w/ hx of afib (not on AC), found to be in afib w/ RVR w/ HR up to 150s on assessment by EMS  -during this admission, patient has been in and out of sinus; ?tachy-portia syndrome  -c/w cardizem 120 qd per cardiology  -switched amiodarone from 200 bid to 400 q8h per cardiology  -AC discussed w/ pt's daughter per cardiology, will start eliquis 2.5 bid  -continue tele monitoring Patient w/ hx of afib (not on AC), found to be in afib w/ RVR w/ HR up to 150s on assessment by EMS  -during this admission, patient has been in and out of sinus; ?tachy-portia syndrome  -c/w cardizem 120 qd per cardiology  -switched from amiodarone 200 bid to amio load w/ 400 q8h for 12 doses to be followed by maintenance dose of 200 qd per cardiology  -AC discussed w/ pt's daughter per cardiology, will start eliquis 2.5 bid  -continue tele monitoring TTE from 1/2017 showing hyperdynamic LV, EF 75%, mild-moderate AS, severe AR, no segmental wall motion abnormality  Repeat TTE obtained per cardiology to evaluate for valvular disease and pt's candidacy for TAVR showing mild MS, moderate-severe AS, severe AR, hyperdynamic LV systolic fx, and mild-moderate pulm regurg; However pt w/ underlying cognitive impairment

## 2017-11-21 NOTE — PROGRESS NOTE ADULT - PROBLEM SELECTOR PLAN 2
AS/AI. To ask TAVR team to evaluate but may not be candidate due to cognitive dysfunction and AI. Continue lasix and current CV meds.

## 2017-11-21 NOTE — CONSULT NOTE ADULT - PROBLEM SELECTOR RECOMMENDATION 4
Patient with mixed Aortic Valve disease, with moderate to severe Aortic Stenosis, and Severe Aortic Insufficiency. She is at high risk for surgical replacement. With severe AI, TAVR not indicated, as the AI Appears to be the prominent problem. BAV also contraindicated with severe AI. Will review TTE to see if TAVR Feasible
refused work up prior

## 2017-11-21 NOTE — PROGRESS NOTE ADULT - PROBLEM SELECTOR PLAN 10
DVT PPX: Subq heparin  GI PPX: PPI pre-breakfast, dysphagia 3 diet (DASH/TLC) w/ no concentrated phosphorus    Dispo: PT recommending d/c to [facility], pt's daughter on board

## 2017-11-21 NOTE — PROGRESS NOTE ADULT - SUBJECTIVE AND OBJECTIVE BOX
Patient is a 87y old  Female who presents with a chief complaint of Shortness of breath, generalized weakness, malaise x 1 week (2017 17:02)      SUBJECTIVE / OVERNIGHT EVENTS:  Patient seen and examined at bedside. No acute events overnight. Still in afib but rate-controlled. Pt is poor historian but denies fever, cough, SOB, CP, palpitations, abd pain, and body aches this AM.      MEDICATIONS  (STANDING):  amiodarone    Tablet 200 milliGRAM(s) Oral two times a day  aspirin enteric coated 81 milliGRAM(s) Oral daily  atorvastatin 10 milliGRAM(s) Oral at bedtime  buDESOnide   0.5 milliGRAM(s) Respule 0.5 milliGRAM(s) Inhalation two times a day  cholecalciferol 2000 Unit(s) Oral daily  diltiazem    milliGRAM(s) Oral daily  furosemide    Tablet 20 milliGRAM(s) Oral daily  heparin  Injectable 5000 Unit(s) SubCutaneous every 8 hours  influenza   Vaccine 0.5 milliLiter(s) IntraMuscular once  levalbuterol Inhalation 0.63 milliGRAM(s) Inhalation every 6 hours  levETIRAcetam 250 milliGRAM(s) Oral daily  montelukast 10 milliGRAM(s) Oral daily  pantoprazole    Tablet 40 milliGRAM(s) Oral before breakfast  tiotropium 18 MICROgram(s) Capsule 1 Capsule(s) Inhalation daily    MEDICATIONS  (PRN):  docusate sodium 100 milliGRAM(s) Oral daily PRN Constipation  senna 2 Tablet(s) Oral at bedtime PRN Constipation      Vital Signs Last 24 Hrs  T(C): 37 (2017 04:01), Max: 37 (2017 04:01)  T(F): 98.6 (2017 04:01), Max: 98.6 (2017 04:01)  HR: 98 (2017 04:01) (81 - 98)  BP: 142/73 (2017 04:01) (122/65 - 142/73)  BP(mean): --  RR: 20 (2017 04:01) (18 - 20)  SpO2: 94% (2017 04:01) (93% - 95%)  CAPILLARY BLOOD GLUCOSE        I&O's Summary    2017 07:01  -  2017 07:00  --------------------------------------------------------  IN: 600 mL / OUT: 700 mL / NET: -100 mL    2017 07:01  -  2017 06:10  --------------------------------------------------------  IN: 540 mL / OUT: 500 mL / NET: 40 mL        PHYSICAL EXAM:  GENERAL: NAD, laying in bed; breathing comfortably on NC  HEAD: NC/AT  EYES:  PERRLA, conjunctiva and sclera clear  NECK: Supple, No JVD  CHEST/LUNG: Mild expiratory wheezes, No rales or rhonchi  HEART: Regular rate, irregular rhythm; +SM  ABDOMEN: Soft, Nontender, Nondistended; Bowel sounds present  EXTREMITIES:  No clubbing, cyanosis, or edema  PSYCH: Normal mood/affect; answers questions appropriately  NEUROLOGY: AAOx1; no focal deficits  SKIN: No rashes or lesions    LABS:                        11.6   11.5  )-----------( 437      ( 2017 05:45 )             36.7     WBC Trend: 11.5<--, 11.4<--, 10.9<--  11-20    146<H>  |  104  |  56<H>  ----------------------------<  121<H>  4.3   |  23  |  1.67<H>    Ca    9.1      2017 06:14  Phos  3.9     11-20  Mg     2.3     11-20      Creatinine Trend: 1.67<--, 1.82<--, 1.89<--, 1.91<--, 1.52<--        Urinalysis Basic - ( 2017 00:16 )    Color: PL Yellow / Appearance: Clear / S.011 / pH: x  Gluc: x / Ketone: Negative  / Bili: Negative / Urobili: Negative   Blood: x / Protein: Negative / Nitrite: Negative   Leuk Esterase: Negative / RBC: x / WBC x   Sq Epi: x / Non Sq Epi: x / Bacteria: x          RADIOLOGY & ADDITIONAL TESTS:    Imaging Personally Reviewed:  CXR: < from: Xray Chest 1 View AP- PORTABLE-Urgent (17 @ 11:18) >  Small bilateral pleural effusions with adjacent atelectasis.    TTE: < from: Transthoracic Echocardiogram (17 @ 13:20) >  1. Mitral annular calcification and calcifiedmitral  leaflets with decreased diastolic opening. Moderate mitral  regurgitation. Mean transmitral valve gradient equals 6 mm  Hg, estimated mitral valve area equals 1.9 sqcm (by  pressure half time equation), consistent with mild mitral  stenosis.  2. Calcified trileaflet aortic valve with decreased  opening. Peak transaortic valve gradient equals 55 mm Hg,  mean transaortic valve gradient equals 27 mm Hg, estimated  aortic valve area equals 1 sqcm (by continuity equation),  aortic valve velocity time integral equals 66 cm,  consistent with moderate-severe aortic stenosis. The  transaortic gradient may be elevated due to aortic  regurgitation, which  would overestimate severity of aortic  stenosis. Severe aortic regurgitation.  3. Hyperdynamic left ventricular systolic function.  4. Normal right ventricular size and function.  5. Normal pulmonic valve. Mild-moderate pulmonic  regurgitation.  *** Compared with echocardiogram of 2017, no  significant changes noted.    Consultant(s) Notes Reviewed: Pulmonology, Cardiology    Care Discussed with Consultants/Other Providers:    CONTACT #: [id number] Patient is a 87y old  Female who presents with a chief complaint of Shortness of breath, generalized weakness, malaise x 1 week (2017 17:02)      SUBJECTIVE / OVERNIGHT EVENTS:  Patient seen and examined at bedside. Was in afib w/ HR 90-120s ON, as high as 130s. Pt is poor historian but denies fever, cough, SOB, CP, palpitations, abd pain, and body aches this AM.      MEDICATIONS  (STANDING):  amiodarone    Tablet 200 milliGRAM(s) Oral two times a day  aspirin enteric coated 81 milliGRAM(s) Oral daily  atorvastatin 10 milliGRAM(s) Oral at bedtime  buDESOnide   0.5 milliGRAM(s) Respule 0.5 milliGRAM(s) Inhalation two times a day  cholecalciferol 2000 Unit(s) Oral daily  diltiazem    milliGRAM(s) Oral daily  furosemide    Tablet 20 milliGRAM(s) Oral daily  heparin  Injectable 5000 Unit(s) SubCutaneous every 8 hours  influenza   Vaccine 0.5 milliLiter(s) IntraMuscular once  levalbuterol Inhalation 0.63 milliGRAM(s) Inhalation every 6 hours  levETIRAcetam 250 milliGRAM(s) Oral daily  montelukast 10 milliGRAM(s) Oral daily  pantoprazole    Tablet 40 milliGRAM(s) Oral before breakfast  tiotropium 18 MICROgram(s) Capsule 1 Capsule(s) Inhalation daily    MEDICATIONS  (PRN):  docusate sodium 100 milliGRAM(s) Oral daily PRN Constipation  senna 2 Tablet(s) Oral at bedtime PRN Constipation      Vital Signs Last 24 Hrs  T(C): 37 (2017 04:01), Max: 37 (2017 04:01)  T(F): 98.6 (2017 04:01), Max: 98.6 (2017 04:01)  HR: 98 (2017 04:01) (81 - 98)  BP: 142/73 (2017 04:01) (122/65 - 142/73)  BP(mean): --  RR: 20 (2017 04:01) (18 - 20)  SpO2: 94% (2017 04:01) (93% - 95%)  CAPILLARY BLOOD GLUCOSE        I&O's Summary    2017 07:01  -  2017 07:00  --------------------------------------------------------  IN: 600 mL / OUT: 700 mL / NET: -100 mL    2017 07:01  -  2017 06:10  --------------------------------------------------------  IN: 540 mL / OUT: 500 mL / NET: 40 mL        PHYSICAL EXAM:  GENERAL: NAD, laying in bed; breathing comfortably on NC  HEAD: NC/AT  EYES:  PERRLA, conjunctiva and sclera clear  NECK: Supple, No JVD  CHEST/LUNG: Mild expiratory wheezes, No rales or rhonchi  HEART: Regular rate, irregular rhythm; +SM  ABDOMEN: Soft, Nontender, Nondistended; Bowel sounds present  EXTREMITIES:  No clubbing, cyanosis, or edema  PSYCH: Normal mood/affect; answers questions appropriately  NEUROLOGY: AAOx1; no focal deficits  SKIN: No rashes or lesions    LABS:                        11.6   11.5  )-----------( 437      ( 2017 05:45 )             36.7     WBC Trend: 11.5<--, 11.4<--, 10.9<--  11-20    146<H>  |  104  |  56<H>  ----------------------------<  121<H>  4.3   |  23  |  1.67<H>    Ca    9.1      2017 06:14  Phos  3.9     11-20  Mg     2.3     11-20      Creatinine Trend: 1.67<--, 1.82<--, 1.89<--, 1.91<--, 1.52<--        Urinalysis Basic - ( 2017 00:16 )    Color: PL Yellow / Appearance: Clear / S.011 / pH: x  Gluc: x / Ketone: Negative  / Bili: Negative / Urobili: Negative   Blood: x / Protein: Negative / Nitrite: Negative   Leuk Esterase: Negative / RBC: x / WBC x   Sq Epi: x / Non Sq Epi: x / Bacteria: x          RADIOLOGY & ADDITIONAL TESTS:    Imaging Personally Reviewed:  CXR: < from: Xray Chest 1 View AP- PORTABLE-Urgent (17 @ 11:18) >  Small bilateral pleural effusions with adjacent atelectasis.    TTE: < from: Transthoracic Echocardiogram (17 @ 13:20) >  1. Mitral annular calcification and calcifiedmitral  leaflets with decreased diastolic opening. Moderate mitral  regurgitation. Mean transmitral valve gradient equals 6 mm  Hg, estimated mitral valve area equals 1.9 sqcm (by  pressure half time equation), consistent with mild mitral  stenosis.  2. Calcified trileaflet aortic valve with decreased  opening. Peak transaortic valve gradient equals 55 mm Hg,  mean transaortic valve gradient equals 27 mm Hg, estimated  aortic valve area equals 1 sqcm (by continuity equation),  aortic valve velocity time integral equals 66 cm,  consistent with moderate-severe aortic stenosis. The  transaortic gradient may be elevated due to aortic  regurgitation, which  would overestimate severity of aortic  stenosis. Severe aortic regurgitation.  3. Hyperdynamic left ventricular systolic function.  4. Normal right ventricular size and function.  5. Normal pulmonic valve. Mild-moderate pulmonic  regurgitation.  *** Compared with echocardiogram of 2017, no  significant changes noted.    Consultant(s) Notes Reviewed: Pulmonology, Cardiology    Care Discussed with Consultants/Other Providers:    CONTACT #: [id number] Patient is a 87y old  Female who presents with a chief complaint of Shortness of breath, generalized weakness, malaise x 1 week (2017 17:02)      SUBJECTIVE / OVERNIGHT EVENTS:  Patient seen and examined at bedside. Was in afib w/ HR 90-120s ON, as high as 130s. Pt is poor historian but denies fever, cough, SOB, CP, palpitations, abd pain, and body aches this AM.      MEDICATIONS  (STANDING):  amiodarone    Tablet 200 milliGRAM(s) Oral two times a day  aspirin enteric coated 81 milliGRAM(s) Oral daily  atorvastatin 10 milliGRAM(s) Oral at bedtime  buDESOnide   0.5 milliGRAM(s) Respule 0.5 milliGRAM(s) Inhalation two times a day  cholecalciferol 2000 Unit(s) Oral daily  diltiazem    milliGRAM(s) Oral daily  furosemide    Tablet 20 milliGRAM(s) Oral daily  heparin  Injectable 5000 Unit(s) SubCutaneous every 8 hours  influenza   Vaccine 0.5 milliLiter(s) IntraMuscular once  levalbuterol Inhalation 0.63 milliGRAM(s) Inhalation every 6 hours  levETIRAcetam 250 milliGRAM(s) Oral daily  montelukast 10 milliGRAM(s) Oral daily  pantoprazole    Tablet 40 milliGRAM(s) Oral before breakfast  tiotropium 18 MICROgram(s) Capsule 1 Capsule(s) Inhalation daily    MEDICATIONS  (PRN):  docusate sodium 100 milliGRAM(s) Oral daily PRN Constipation  senna 2 Tablet(s) Oral at bedtime PRN Constipation      Vital Signs Last 24 Hrs  T(C): 37 (2017 04:01), Max: 37 (2017 04:01)  T(F): 98.6 (2017 04:01), Max: 98.6 (2017 04:01)  HR: 98 (2017 04:01) (81 - 98)  BP: 142/73 (2017 04:01) (122/65 - 142/73)  BP(mean): --  RR: 20 (2017 04:01) (18 - 20)  SpO2: 94% (2017 04:01) (93% - 95%)  CAPILLARY BLOOD GLUCOSE        I&O's Summary    2017 07:01  -  2017 07:00  --------------------------------------------------------  IN: 600 mL / OUT: 700 mL / NET: -100 mL    2017 07:01  -  2017 06:10  --------------------------------------------------------  IN: 540 mL / OUT: 500 mL / NET: 40 mL        PHYSICAL EXAM:  GENERAL: NAD, laying in bed; breathing comfortably on NC  HEAD: NC/AT  EYES:  PERRLA, conjunctiva and sclera clear  NECK: Supple, No JVD  CHEST/LUNG: Mild expiratory wheezes, No rales or rhonchi  HEART: Regular rate, irregular rhythm; +SM  ABDOMEN: Soft, Nontender, Nondistended; Bowel sounds present  EXTREMITIES:  No clubbing, cyanosis, or edema  PSYCH: Normal mood/affect; answers questions appropriately  NEUROLOGY: AAOx1; no focal deficits  SKIN: No rashes or lesions    LABS:                        11.6   11.5  )-----------( 437      ( 2017 05:45 )             36.7     WBC Trend: 11.5<--, 11.4<--, 10.9<--  11-21    144  |  102  |  49<H>  ----------------------------<  132<H>  4.5   |  24  |  1.64<H>    Ca    9.3      2017 05:45  Phos  3.6       Mg     2.3         Creatinine Trend: 1.64<--, 1.67<--, 1.82<--, 1.89<--, 1.91<--, 1.52<--        Urinalysis Basic - ( 2017 00:16 )    Color: PL Yellow / Appearance: Clear / S.011 / pH: x  Gluc: x / Ketone: Negative  / Bili: Negative / Urobili: Negative   Blood: x / Protein: Negative / Nitrite: Negative   Leuk Esterase: Negative / RBC: x / WBC x   Sq Epi: x / Non Sq Epi: x / Bacteria: x    Culture - Urine (17 @ 03:07)    Specimen Source: .Urine Clean Catch (Midstream)    Culture Results:   <10,000 CFU/ml Normal Urogenital catie present    RADIOLOGY & ADDITIONAL TESTS:    Imaging Personally Reviewed:  CXR: < from: Xray Chest 1 View AP- PORTABLE-Urgent (17 @ 11:18) >  Small bilateral pleural effusions with adjacent atelectasis.    TTE: < from: Transthoracic Echocardiogram (17 @ 13:20) >  1. Mitral annular calcification and calcifiedmitral  leaflets with decreased diastolic opening. Moderate mitral  regurgitation. Mean transmitral valve gradient equals 6 mm  Hg, estimated mitral valve area equals 1.9 sqcm (by  pressure half time equation), consistent with mild mitral  stenosis.  2. Calcified trileaflet aortic valve with decreased  opening. Peak transaortic valve gradient equals 55 mm Hg,  mean transaortic valve gradient equals 27 mm Hg, estimated  aortic valve area equals 1 sqcm (by continuity equation),  aortic valve velocity time integral equals 66 cm,  consistent with moderate-severe aortic stenosis. The  transaortic gradient may be elevated due to aortic  regurgitation, which  would overestimate severity of aortic  stenosis. Severe aortic regurgitation.  3. Hyperdynamic left ventricular systolic function.  4. Normal right ventricular size and function.  5. Normal pulmonic valve. Mild-moderate pulmonic  regurgitation.  *** Compared with echocardiogram of 2017, no  significant changes noted.    Consultant(s) Notes Reviewed: Pulmonology, Cardiology    Care Discussed with Consultants/Other Providers:    CONTACT #: [id number] Patient is a 87y old  Female who presents with a chief complaint of Shortness of breath, generalized weakness, malaise x 1 week (2017 17:02)    SUBJECTIVE / OVERNIGHT EVENTS:  Patient seen and examined at bedside. Was in afib w/ HR 90-120s ON, as high as 130s. Pt is poor historian but denies fever, cough, SOB, CP, palpitations, abd pain, and body aches this AM.    MEDICATIONS  (STANDING):  amiodarone    Tablet 200 milliGRAM(s) Oral two times a day  aspirin enteric coated 81 milliGRAM(s) Oral daily  atorvastatin 10 milliGRAM(s) Oral at bedtime  buDESOnide   0.5 milliGRAM(s) Respule 0.5 milliGRAM(s) Inhalation two times a day  cholecalciferol 2000 Unit(s) Oral daily  diltiazem    milliGRAM(s) Oral daily  furosemide    Tablet 20 milliGRAM(s) Oral daily  heparin  Injectable 5000 Unit(s) SubCutaneous every 8 hours  influenza   Vaccine 0.5 milliLiter(s) IntraMuscular once  levalbuterol Inhalation 0.63 milliGRAM(s) Inhalation every 6 hours  levETIRAcetam 250 milliGRAM(s) Oral daily  montelukast 10 milliGRAM(s) Oral daily  pantoprazole    Tablet 40 milliGRAM(s) Oral before breakfast  tiotropium 18 MICROgram(s) Capsule 1 Capsule(s) Inhalation daily    MEDICATIONS  (PRN):  docusate sodium 100 milliGRAM(s) Oral daily PRN Constipation  senna 2 Tablet(s) Oral at bedtime PRN Constipation    Vital Signs Last 24 Hrs  T(C): 37 (2017 04:01), Max: 37 (2017 04:01)  T(F): 98.6 (2017 04:01), Max: 98.6 (2017 04:01)  HR: 98 (2017 04:01) (81 - 98)  BP: 142/73 (2017 04:01) (122/65 - 142/73)  RR: 20 (2017 04:01) (18 - 20)  SpO2: 94% (2017 04:01) (93% - 95%)    I&O's Summary    2017 07:01  -  2017 07:00  --------------------------------------------------------  IN: 600 mL / OUT: 700 mL / NET: -100 mL    2017 07:01  -  2017 06:10  --------------------------------------------------------  IN: 540 mL / OUT: 500 mL / NET: 40 mL    PHYSICAL EXAM:  GENERAL: NAD, laying in bed; breathing comfortably on NC  HEAD: NC/AT  EYES:  PERRLA, conjunctiva and sclera clear  NECK: Supple, No JVD  CHEST/LUNG: Mild expiratory wheezes, No rales or rhonchi  HEART: Regular rate, irregular rhythm; +SM  ABDOMEN: Soft, Nontender, Nondistended; Bowel sounds present  EXTREMITIES:  No clubbing, cyanosis, or edema  PSYCH: Normal mood/affect; answers questions appropriately  NEUROLOGY: AAOx1; no focal deficits  SKIN: No rashes or lesions    LABS:                   11.6   11.5  )-----------( 437      ( 2017 05:45 )             36.7     WBC Trend: 11.5<--, 11.4<--, 10.9<--  1121    144  |  102  |  49<H>  ----------------------------<  132<H>  4.5   |  24  |  1.64<H>    Ca    9.3      2017 05:45  Phos  3.6       Mg     2.3         Creatinine Trend: 1.64<--, 1.67<--, 1.82<--, 1.89<--, 1.91<--, 1.52<--    Urinalysis Basic - ( 2017 00:16 )  Color: PL Yellow / Appearance: Clear / S.011 / pH: x  Gluc: x / Ketone: Negative  / Bili: Negative / Urobili: Negative   Blood: x / Protein: Negative / Nitrite: Negative   Leuk Esterase: Negative / RBC: x / WBC x   Sq Epi: x / Non Sq Epi: x / Bacteria: x    Culture - Urine (17 @ 03:07)    Specimen Source: .Urine Clean Catch (Midstream)    Culture Results:   <10,000 CFU/ml Normal Urogenital catie present    RADIOLOGY & ADDITIONAL TESTS:    Imaging Personally Reviewed:  CXR: < from: Xray Chest 1 View AP- PORTABLE-Urgent (17 @ 11:18) >  Small bilateral pleural effusions with adjacent atelectasis.    TTE: < from: Transthoracic Echocardiogram (17 @ 13:20) >  1. Mitral annular calcification and calcifiedmitral  leaflets with decreased diastolic opening. Moderate mitral  regurgitation. Mean transmitral valve gradient equals 6 mm  Hg, estimated mitral valve area equals 1.9 sqcm (by  pressure half time equation), consistent with mild mitral  stenosis.  2. Calcified trileaflet aortic valve with decreased  opening. Peak transaortic valve gradient equals 55 mm Hg,  mean transaortic valve gradient equals 27 mm Hg, estimated  aortic valve area equals 1 sqcm (by continuity equation),  aortic valve velocity time integral equals 66 cm,  consistent with moderate-severe aortic stenosis. The  transaortic gradient may be elevated due to aortic  regurgitation, which  would overestimate severity of aortic  stenosis. Severe aortic regurgitation.  3. Hyperdynamic left ventricular systolic function.  4. Normal right ventricular size and function.  5. Normal pulmonic valve. Mild-moderate pulmonic  regurgitation.  *** Compared with echocardiogram of 2017, no  significant changes noted.    Consultant(s) Notes Reviewed: Pulmonology, Cardiology, Pulmonology    CONTACT #: [id number]

## 2017-11-21 NOTE — PROGRESS NOTE ADULT - PROBLEM SELECTOR PLAN 3
-respiratory symptoms more likely due to CHF than pneumonia  -holding abx for now  -repeat CXR showing small bilateral pleural effusions with adjacent atelectasis but no focal consolidations  -BCx w/ NGTD, UA unremarkable, RVP negative; F/u UCx -respiratory symptoms more likely due to CHF than pneumonia  -holding abx for now  -repeat CXR showing small bilateral pleural effusions with adjacent atelectasis but no focal consolidations  -BCx w/ NGTD, UA and UCx unremarkable, RVP negative As above re: severe aortic stenosis

## 2017-11-21 NOTE — PROGRESS NOTE ADULT - PROBLEM SELECTOR PLAN 7
Patient has prior hx of stroke  C/w home atorvastatin and asa Patient w/ SOB x 1 wk, found to be hypoxic and tachypneic   NC to maintain O2 sat > 90%; Will wean as tolerated  C/w montelukast, budesonide, xopenex, and spiriva per pulm  Pulmonary toilet-incentive spirometry, Chest Pt-acapella/chest vest up to 5x daily per pulm  Will obtain CT chest for further evaluation of 1.3 cm RLL pulmonary nodule, although pt not a candidate for surgical intervention

## 2017-11-21 NOTE — PROGRESS NOTE ADULT - ATTENDING COMMENTS
as above--situation is most c/w CHF and not PNA--no signs or sx to support (CXR c/w APE and not PNA)  ABX (ceftriaxone and zithromax)--discontinued (I agree)  restart xopenex/pulmicort/spiriva; continue O2  Repeat CXR abnormal-"nodule"--dedicated CT of chest (not a candidate for any surgical intervention)  echo pending.  Cardiology management of AF and diuretics.  Edwin Briones MD-Pulmonary   [phone]

## 2017-11-21 NOTE — PROGRESS NOTE ADULT - PROBLEM SELECTOR PLAN 6
Patient w/ SOB x 1 wk, found to be hypoxic and tachypneic   NC to maintain O2 sat > 90%; Will wean as tolerated  C/w montelukast, budesonide, xopenex, and spiriva per pulm  Pulmonary toilet-incentive spirometry, Chest Pt-acapella/chest vest up to 5x daily per pulm  Will obtain CT chest for further evaluation of 1.3 cm RLL pulmonary nodule, although pt not a candidate for surgical intervention Patient w/ SCr 1.52 on admission (baseline SCr ~1.1); Likely 2/2 hypoperfusion  S/p 750 cc NS; Will not aggressively hydrate w/ IVF for now due to concern for acute HF exacerbation  Trend SCr  Renally dose medications

## 2017-11-21 NOTE — PROGRESS NOTE ADULT - PROBLEM SELECTOR PLAN 4
Patient w/ SCr 1.52 on admission (baseline SCr ~1.1); Likely 2/2 hypoperfusion  S/p 750 cc NS; Will not aggressively hydrate w/ IVF for now due to concern for acute HF exacerbation  Trend SCr  Renally dose medications Patient w/ hx of afib (not on AC), found to be in afib w/ RVR w/ HR up to 150s on assessment by EMS  -during this admission, patient has been in and out of sinus; ?tachy-portia syndrome  -c/w cardizem 120 qd per cardiology  -switched from amiodarone 200 bid to amio load w/ 400 q8h for 12 doses to be followed by maintenance dose of 200 qd per cardiology  -AC discussed w/ pt's daughter per cardiology, will start eliquis 2.5 bid  -continue tele monitoring

## 2017-11-21 NOTE — PROGRESS NOTE ADULT - PROBLEM SELECTOR PLAN 1
Patient originally p/w SOB x 1 week w/ signs of overload on physical exam, elevated pro-BNP, and CXR concerning for acute HF exacerbation  Given lasix 20 IV x 1 per cards recs  Volume status appears to be improving; Presently off IV lasix and on lasix 20 PO per cardiology  Strict I+Os for goal net negative 500cc to 1L daily per cards recs; Will diurese as needed to meet goal with close monitoring  Monitor BMP, keep K above 4 and Mg above 2 per pulm  Daily weights  TTE from 1/2017 showing hyperdynamic LV, EF 75%, mild-moderate AS, severe AR, no segmental wall motion abnormality  Repeat TTE obtained per cardiology to evaluate for valvular disease and pt's candidacy for TAVR showing mild MS, moderate-severe AS, severe AR, hyperdynamic LV systolic fx, and mild-moderate pulm regurg; However pt w/ underlying cognitive impairment Patient originally p/w SOB x 1 week w/ signs of overload on physical exam, elevated pro-BNP, and CXR concerning for acute HF exacerbation  Given lasix 20 IV x 1 per cards recs  Volume status appears to be improving; Presently off IV lasix and on lasix 20 PO per cardiology  Strict I+Os for goal net negative 500cc to 1L daily per cards recs; Will diurese as needed to meet goal with close monitoring  Monitor BMP, keep K above 4 and Mg above 2 per pulm  Daily weights

## 2017-11-21 NOTE — PROGRESS NOTE ADULT - PROBLEM SELECTOR PLAN 3
As above. AS/ AI. To ask TAVR team to evaluate but may not be candidate due to cognitive dysfunction and AI

## 2017-11-22 DIAGNOSIS — R91.1 SOLITARY PULMONARY NODULE: ICD-10-CM

## 2017-11-22 LAB
ANION GAP SERPL CALC-SCNC: 15 MMOL/L — SIGNIFICANT CHANGE UP (ref 5–17)
BUN SERPL-MCNC: 56 MG/DL — HIGH (ref 7–23)
CALCIUM SERPL-MCNC: 9.5 MG/DL — SIGNIFICANT CHANGE UP (ref 8.4–10.5)
CHLORIDE SERPL-SCNC: 105 MMOL/L — SIGNIFICANT CHANGE UP (ref 96–108)
CO2 SERPL-SCNC: 25 MMOL/L — SIGNIFICANT CHANGE UP (ref 22–31)
CREAT SERPL-MCNC: 2 MG/DL — HIGH (ref 0.5–1.3)
CULTURE RESULTS: SIGNIFICANT CHANGE UP
CULTURE RESULTS: SIGNIFICANT CHANGE UP
GLUCOSE SERPL-MCNC: 88 MG/DL — SIGNIFICANT CHANGE UP (ref 70–99)
HCT VFR BLD CALC: 36.2 % — SIGNIFICANT CHANGE UP (ref 34.5–45)
HGB BLD-MCNC: 11.4 G/DL — LOW (ref 11.5–15.5)
MAGNESIUM SERPL-MCNC: 2.4 MG/DL — SIGNIFICANT CHANGE UP (ref 1.6–2.6)
MCHC RBC-ENTMCNC: 27.5 PG — SIGNIFICANT CHANGE UP (ref 27–34)
MCHC RBC-ENTMCNC: 31.5 GM/DL — LOW (ref 32–36)
MCV RBC AUTO: 87.2 FL — SIGNIFICANT CHANGE UP (ref 80–100)
PHOSPHATE SERPL-MCNC: 4.7 MG/DL — HIGH (ref 2.5–4.5)
PLATELET # BLD AUTO: 394 K/UL — SIGNIFICANT CHANGE UP (ref 150–400)
POTASSIUM SERPL-MCNC: 4.7 MMOL/L — SIGNIFICANT CHANGE UP (ref 3.5–5.3)
POTASSIUM SERPL-SCNC: 4.7 MMOL/L — SIGNIFICANT CHANGE UP (ref 3.5–5.3)
RBC # BLD: 4.16 M/UL — SIGNIFICANT CHANGE UP (ref 3.8–5.2)
RBC # FLD: 14.6 % — HIGH (ref 10.3–14.5)
SODIUM SERPL-SCNC: 145 MMOL/L — SIGNIFICANT CHANGE UP (ref 135–145)
SPECIMEN SOURCE: SIGNIFICANT CHANGE UP
SPECIMEN SOURCE: SIGNIFICANT CHANGE UP
WBC # BLD: 12.5 K/UL — HIGH (ref 3.8–10.5)
WBC # FLD AUTO: 12.5 K/UL — HIGH (ref 3.8–10.5)

## 2017-11-22 PROCEDURE — 99233 SBSQ HOSP IP/OBS HIGH 50: CPT | Mod: GC

## 2017-11-22 PROCEDURE — 99233 SBSQ HOSP IP/OBS HIGH 50: CPT

## 2017-11-22 RX ADMIN — AMIODARONE HYDROCHLORIDE 400 MILLIGRAM(S): 400 TABLET ORAL at 22:01

## 2017-11-22 RX ADMIN — MONTELUKAST 10 MILLIGRAM(S): 4 TABLET, CHEWABLE ORAL at 11:24

## 2017-11-22 RX ADMIN — Medication 2000 UNIT(S): at 11:24

## 2017-11-22 RX ADMIN — Medication 0.5 MILLIGRAM(S): at 05:42

## 2017-11-22 RX ADMIN — Medication 20 MILLIGRAM(S): at 05:42

## 2017-11-22 RX ADMIN — PANTOPRAZOLE SODIUM 40 MILLIGRAM(S): 20 TABLET, DELAYED RELEASE ORAL at 05:42

## 2017-11-22 RX ADMIN — Medication 0.5 MILLIGRAM(S): at 17:25

## 2017-11-22 RX ADMIN — AMIODARONE HYDROCHLORIDE 400 MILLIGRAM(S): 400 TABLET ORAL at 13:20

## 2017-11-22 RX ADMIN — LEVETIRACETAM 250 MILLIGRAM(S): 250 TABLET, FILM COATED ORAL at 11:24

## 2017-11-22 RX ADMIN — LEVALBUTEROL 0.63 MILLIGRAM(S): 1.25 SOLUTION, CONCENTRATE RESPIRATORY (INHALATION) at 11:24

## 2017-11-22 RX ADMIN — APIXABAN 2.5 MILLIGRAM(S): 2.5 TABLET, FILM COATED ORAL at 17:25

## 2017-11-22 RX ADMIN — AMIODARONE HYDROCHLORIDE 400 MILLIGRAM(S): 400 TABLET ORAL at 05:42

## 2017-11-22 RX ADMIN — LEVALBUTEROL 0.63 MILLIGRAM(S): 1.25 SOLUTION, CONCENTRATE RESPIRATORY (INHALATION) at 23:32

## 2017-11-22 RX ADMIN — LEVALBUTEROL 0.63 MILLIGRAM(S): 1.25 SOLUTION, CONCENTRATE RESPIRATORY (INHALATION) at 05:42

## 2017-11-22 RX ADMIN — APIXABAN 2.5 MILLIGRAM(S): 2.5 TABLET, FILM COATED ORAL at 05:43

## 2017-11-22 RX ADMIN — Medication 120 MILLIGRAM(S): at 05:41

## 2017-11-22 RX ADMIN — SENNA PLUS 2 TABLET(S): 8.6 TABLET ORAL at 22:01

## 2017-11-22 RX ADMIN — TIOTROPIUM BROMIDE 1 CAPSULE(S): 18 CAPSULE ORAL; RESPIRATORY (INHALATION) at 11:24

## 2017-11-22 RX ADMIN — ATORVASTATIN CALCIUM 10 MILLIGRAM(S): 80 TABLET, FILM COATED ORAL at 22:01

## 2017-11-22 RX ADMIN — LEVALBUTEROL 0.63 MILLIGRAM(S): 1.25 SOLUTION, CONCENTRATE RESPIRATORY (INHALATION) at 00:02

## 2017-11-22 RX ADMIN — LEVALBUTEROL 0.63 MILLIGRAM(S): 1.25 SOLUTION, CONCENTRATE RESPIRATORY (INHALATION) at 17:25

## 2017-11-22 NOTE — PROGRESS NOTE ADULT - ATTENDING COMMENTS
as above--situation is most c/w CHF and not PNA--no signs or sx to support (CXR c/w APE and not PNA)  Restarted on xopenex/pulmicort/spiriva; continue O2  Repeat CXR abnormal-"nodule"--dedicated CT of chest reveals bilateral effusions/atelectasis--c/w CHF  echo reveals significant AVdz--TAVR evaluation  Cardiology management of AF and diuretics.  Edwin Briones MD-Pulmonary   [phone]

## 2017-11-22 NOTE — PROGRESS NOTE ADULT - PROBLEM SELECTOR PLAN 7
Patient w/ SOB x 1 wk, found to be hypoxic and tachypneic   NC to maintain O2 sat > 90%; Will wean as tolerated  C/w montelukast, budesonide, xopenex, and spiriva per pulm  Pulmonary toilet-incentive spirometry, Chest Pt-acapella/chest vest up to 5x daily per pulm  Wean off supplemental O2 as tolerated

## 2017-11-22 NOTE — PROGRESS NOTE ADULT - PROBLEM SELECTOR PLAN 5
On admission.  Patient w/ troponin 0.11, likely 2/2 demand ischemia (Type II NSTEMI); ACS less likely given no ischemic changes on EKG  Second troponin 0.12, third troponin 0.16  D/philip asa 81 per cardiology recommendations; C/w home atorvastatin

## 2017-11-22 NOTE — PROGRESS NOTE ADULT - PROBLEM SELECTOR PLAN 1
Converted to NSR. To complete amiodarone load then to maintenance dose. Continue cardizem. D/C planning once load is completed. On eliquis.

## 2017-11-22 NOTE — PROGRESS NOTE ADULT - PROBLEM SELECTOR PLAN 1
Patient originally p/w SOB x 1 week w/ signs of overload on physical exam, elevated pro-BNP, and CXR concerning for acute HF exacerbation  Given lasix 20 IV x 1 per cards recs  Volume status appears to be improving; Presently off IV lasix and on lasix 20 PO per cardiology  Strict I+Os for goal net negative 500cc to 1L daily per cards recs; Will diurese as needed to meet goal with close monitoring  Monitor BMP, keep K above 4 and Mg above 2 per pulm  Daily weights

## 2017-11-22 NOTE — PROGRESS NOTE ADULT - PROBLEM SELECTOR PLAN 4
Patient w/ hx of afib (not on AC), found to be in afib w/ RVR w/ HR up to 150s on assessment by EMS  -during this admission, patient has been in and out of sinus; ?tachy-portia syndrome  -c/w cardizem 120 qd per cardiology  -on amio load w/ 400 q8h for 12 doses to be followed by maintenance dose of 200 qd per cardiology  -started eliquis 2.5 bid  -continue tele monitoring

## 2017-11-22 NOTE — PROGRESS NOTE ADULT - SUBJECTIVE AND OBJECTIVE BOX
CHIEF COMPLAINT: good spirits--no sob or cough    Interval Events: cars follow up--AGGIE vang; amio load/ac    REVIEW OF SYSTEMS:  Constitutional: No fevers or chills. No weight loss. No fatigue or generalized malaise.  Eyes: No itching or discharge from the eyes  ENT: No ear pain. No ear discharge. No nasal congestion. No post nasal drip. No epistaxis. No throat pain. No sore throat. No difficulty swallowing.   CV: No chest pain. No palpitations. No lightheadedness or dizziness.   Resp: No dyspnea at rest. No dyspnea on exertion. No orthopnea. No wheezing. No cough. No stridor. No sputum production. No chest pain with respiration.  GI: No nausea. No vomiting. No diarrhea.  MSK: No joint pain or pain in any extremities  Integumentary: No skin lesions. No pedal edema.  Neurological: No gross motor weakness. No sensory changes.  [+ ] All other systems negative  [ ] Unable to assess ROS because ________    OBJECTIVE:  ICU Vital Signs Last 24 Hrs  T(C): 36.8 (21 Nov 2017 21:19), Max: 36.8 (21 Nov 2017 21:19)  T(F): 98.2 (21 Nov 2017 21:19), Max: 98.2 (21 Nov 2017 21:19)  HR: 68 (21 Nov 2017 21:19) (66 - 133)  BP: 125/66 (21 Nov 2017 21:19) (100/58 - 125/85)  BP(mean): --  ABP: --  ABP(mean): --  RR: 19 (21 Nov 2017 21:19) (19 - 19)  SpO2: 95% (21 Nov 2017 21:19) (95% - 98%)        11-20 @ 07:01  -  11-21 @ 07:00  --------------------------------------------------------  IN: 540 mL / OUT: 500 mL / NET: 40 mL    11-21 @ 07:01  -  11-22 @ 05:33  --------------------------------------------------------  IN: 240 mL / OUT: 50 mL / NET: 190 mL      CAPILLARY BLOOD GLUCOSE          PHYSICAL EXAM: NAD in bed  General: Awake, alert, oriented X 2.   HEENT: Atraumatic, normocephalic.                 Mallampatti Grade 2                No nasal congestion.                No tonsillar or pharyngeal exudates.  Lymph Nodes: No palpable lymphadenopathy  Neck: No JVD. No carotid bruit.   Respiratory: reduced chest expansion                         dullness at bases percussion                         Normal and equal air entry                         No wheeze, rhonchi or rales.  Cardiovascular: S1 S2 normal. + murmurs,no rubs or gallops.   Abdomen: Soft, non-tender, non-distended. No organomegaly.  Extremities: Warm to touch. Peripheral pulse palpable. No pedal edema.   Skin: No rashes or skin lesions  Neurological: Motor and sensory examination equal and normal in all four extremities.  Psychiatry: Appropriate mood and affect.    HOSPITAL MEDICATIONS:  MEDICATIONS  (STANDING):  amiodarone    Tablet 400 milliGRAM(s) Oral every 8 hours  apixaban 2.5 milliGRAM(s) Oral every 12 hours  atorvastatin 10 milliGRAM(s) Oral at bedtime  buDESOnide   0.5 milliGRAM(s) Respule 0.5 milliGRAM(s) Inhalation two times a day  cholecalciferol 2000 Unit(s) Oral daily  diltiazem    milliGRAM(s) Oral daily  furosemide    Tablet 20 milliGRAM(s) Oral daily  influenza   Vaccine 0.5 milliLiter(s) IntraMuscular once  levalbuterol Inhalation 0.63 milliGRAM(s) Inhalation every 6 hours  levETIRAcetam 250 milliGRAM(s) Oral daily  montelukast 10 milliGRAM(s) Oral daily  pantoprazole    Tablet 40 milliGRAM(s) Oral before breakfast  tiotropium 18 MICROgram(s) Capsule 1 Capsule(s) Inhalation daily    MEDICATIONS  (PRN):  docusate sodium 100 milliGRAM(s) Oral daily PRN Constipation  senna 2 Tablet(s) Oral at bedtime PRN Constipation      LABS:                        11.6   11.5  )-----------( 437      ( 21 Nov 2017 05:45 )             36.7     11-21    144  |  102  |  49<H>  ----------------------------<  132<H>  4.5   |  24  |  1.64<H>    Ca    9.3      21 Nov 2017 05:45  Phos  3.6     11-21  Mg     2.3     11-21                MICROBIOLOGY:     RADIOLOGY: < from: CT Chest No Cont (11.21.17 @ 10:57) >  HEST:    Thyroid: Unremarkable  Heart: Mildly enlarged. Valvular and coronary artery calcifications.   Ascending aorta at upper limits of normal at 4.0 cm diameter. 3.2 cm in   diameter and descending thoracic aorta. Unchanged.    Lymph nodes: No significant adenopathy.    Lungs and Pleura:   Large bilateral pleural effusions with associated atelectasis.  Minimal right middle lobe bronchiectasis, unchanged.  Subcentimeter left upper lobe calcified granuloma unchanged.  Airways: Patent    Visualized abdominal structures:   Unremarkable.      Osseous structures:   Compression deformity of the T6 and T7 vertebral bodies unchanged.    IMPRESSION:    Large bilateral effusions with associated atelectasis.      < end of copied text >    [ ] Reviewed and interpreted by me    Point of Care Ultrasound Findings:    PFT:    EKG:

## 2017-11-22 NOTE — PROGRESS NOTE ADULT - PROBLEM SELECTOR PLAN 2
Pt w/ severe AS and severe AR  TTE from 1/2017 showing hyperdynamic LV, EF 75%, mild-moderate AS, severe AR, no segmental wall motion abnormality  Repeat TTE obtained per cardiology to evaluate for valvular disease and pt's candidacy for TAVR showing mild MS, moderate-severe AS, severe AR, hyperdynamic LV systolic fx, and mild-moderate pulm regurg; However pt w/ underlying cognitive impairment  Per CT surgery, given severe AI, TAVR not indicated, as AI is the prominent problem; BAV also contraindicated w/ severe AI. TTE to be reviewed to see if TAVR feasible.

## 2017-11-22 NOTE — PROGRESS NOTE ADULT - SUBJECTIVE AND OBJECTIVE BOX
Patient is a 87y old  Female who presents with a chief complaint of Shortness of breath, generalized weakness, malaise x 1 week (2017 17:02)    She denies c/o chest pain, SOB or palpitations. Not oriented.     Allergies    No Known Allergies    Intolerances      MEDICATIONS  (STANDING):  amiodarone    Tablet 400 milliGRAM(s) Oral every 8 hours  apixaban 2.5 milliGRAM(s) Oral every 12 hours  atorvastatin 10 milliGRAM(s) Oral at bedtime  buDESOnide   0.5 milliGRAM(s) Respule 0.5 milliGRAM(s) Inhalation two times a day  cholecalciferol 2000 Unit(s) Oral daily  diltiazem    milliGRAM(s) Oral daily  furosemide    Tablet 20 milliGRAM(s) Oral daily  influenza   Vaccine 0.5 milliLiter(s) IntraMuscular once  levalbuterol Inhalation 0.63 milliGRAM(s) Inhalation every 6 hours  levETIRAcetam 250 milliGRAM(s) Oral daily  montelukast 10 milliGRAM(s) Oral daily  pantoprazole    Tablet 40 milliGRAM(s) Oral before breakfast  tiotropium 18 MICROgram(s) Capsule 1 Capsule(s) Inhalation daily    MEDICATIONS  (PRN):  docusate sodium 100 milliGRAM(s) Oral daily PRN Constipation  senna 2 Tablet(s) Oral at bedtime PRN Constipation      PHYSICAL EXAM:  Vital Signs Last 24 Hrs  T(C): 36.7 (2017 05:38), Max: 36.8 (2017 21:19)  T(F): 98 (2017 05:38), Max: 98.2 (2017 21:19)  HR: 70 (2017 05:38) (66 - 133)  BP: 145/88 (2017 05:38) (100/58 - 145/88)  BP(mean): --  RR: 18 (2017 05:38) (18 - 19)  SpO2: 96% (2017 05:38) (95% - 98%)  Daily     Daily Weight in k.2 (2017 05:38)  I&O's Summary    2017 07:01  -  2017 07:00  --------------------------------------------------------  IN: 240 mL / OUT: 50 mL / NET: 190 mL    General Appearance: 	awake   HEENT: normocephalic, atraumatic  Neck: no JVD,  carotid 2+  bilaterally with. transmitted murmur  Lungs:  increased expiratory phase bilaterally  Cor:  pmi 5th ICS MCL,regular rate and rhythm, S1 normal intensity, S2 decreased intensity, Grade II/VI mid to late peaking crescendo decrescendo systolic murmur ULSB. Grade II/Vi diastolic murmur ULSB  Abdomen: soft, non-tender; bowel sounds normal; no masses,  no organomegaly  Extremities: without cyanosis, clubbing or edema  Vasc: 2-+ PT and DP pulses; LE varicosities    Telemetry: NSR since yesterday afternoon    Labs:  CBC Full  -  ( 2017 06:31 )  WBC Count : 12.5 K/uL  Hemoglobin : 11.4 g/dL  Hematocrit : 36.2 %  Platelet Count - Automated : 394 K/uL  Mean Cell Volume : 87.2 fl  Mean Cell Hemoglobin : 27.5 pg  Mean Cell Hemoglobin Concentration : 31.5 gm/dL  Auto Neutrophil # : x  Auto Lymphocyte # : x  Auto Monocyte # : x  Auto Eosinophil # : x  Auto Basophil # : x  Auto Neutrophil % : x  Auto Lymphocyte % : x  Auto Monocyte % : x  Auto Eosinophil % : x  Auto Basophil % : x        144  |  102  |  49<H>  ----------------------------<  132<H>  4.5   |  24  |  1.64<H>    Ca    9.3      2017 05:45  Phos  3.6       Mg     2.3                                   Jake Macias MD [facility]  [phone]

## 2017-11-22 NOTE — PROGRESS NOTE ADULT - PROBLEM SELECTOR PLAN 10
DVT PPX: Eliquis 2.5 bid  GI PPX: PPI pre-breakfast, dysphagia 3 diet (DASH/TLC) w/ no concentrated phosphorus    Dispo: PT recommending d/c to [facility], pt's daughter on board

## 2017-11-22 NOTE — PROGRESS NOTE ADULT - SUBJECTIVE AND OBJECTIVE BOX
Patient is a 87y old  Female who presents with a chief complaint of Shortness of breath, generalized weakness, malaise x 1 week (17 Nov 2017 17:02)      SUBJECTIVE / OVERNIGHT EVENTS:  Patient seen and examined at bedside. Yesterday pt had sustained afib x 40 minutes w/ HR in the 130s, given lopressor 5 IV x 1 w/ subsequent conversion to sinus. Was _ on tele. Pt is poor historian but denies fever, SOB, CP, palpitations, abd pain, and body aches this AM.    MEDICATIONS  (STANDING):  amiodarone    Tablet 400 milliGRAM(s) Oral every 8 hours  apixaban 2.5 milliGRAM(s) Oral every 12 hours  atorvastatin 10 milliGRAM(s) Oral at bedtime  buDESOnide   0.5 milliGRAM(s) Respule 0.5 milliGRAM(s) Inhalation two times a day  cholecalciferol 2000 Unit(s) Oral daily  diltiazem    milliGRAM(s) Oral daily  furosemide    Tablet 20 milliGRAM(s) Oral daily  influenza   Vaccine 0.5 milliLiter(s) IntraMuscular once  levalbuterol Inhalation 0.63 milliGRAM(s) Inhalation every 6 hours  levETIRAcetam 250 milliGRAM(s) Oral daily  montelukast 10 milliGRAM(s) Oral daily  pantoprazole    Tablet 40 milliGRAM(s) Oral before breakfast  tiotropium 18 MICROgram(s) Capsule 1 Capsule(s) Inhalation daily    MEDICATIONS  (PRN):  docusate sodium 100 milliGRAM(s) Oral daily PRN Constipation  senna 2 Tablet(s) Oral at bedtime PRN Constipation      Vital Signs Last 24 Hrs  T(C): 36.7 (22 Nov 2017 05:38), Max: 36.8 (21 Nov 2017 21:19)  T(F): 98 (22 Nov 2017 05:38), Max: 98.2 (21 Nov 2017 21:19)  HR: 70 (22 Nov 2017 05:38) (66 - 133)  BP: 145/88 (22 Nov 2017 05:38) (100/58 - 145/88)  BP(mean): --  RR: 18 (22 Nov 2017 05:38) (18 - 19)  SpO2: 96% (22 Nov 2017 05:38) (95% - 98%)  CAPILLARY BLOOD GLUCOSE        I&O's Summary    20 Nov 2017 07:01 - 21 Nov 2017 07:00  --------------------------------------------------------  IN: 540 mL / OUT: 500 mL / NET: 40 mL    21 Nov 2017 07:01  -  22 Nov 2017 06:10  --------------------------------------------------------  IN: 240 mL / OUT: 50 mL / NET: 190 mL        PHYSICAL EXAM:  GENERAL: NAD, laying in bed; breathing comfortably on NC  HEAD: NC/AT  EYES:  PERRLA, conjunctiva and sclera clear  NECK: Supple, No JVD  CHEST/LUNG: Breath sounds CTA b/l; No wheezes, rales, or rhonchi  HEART: RRR; S1 and S2 present; +SM  ABDOMEN: Soft, Nontender, Nondistended; Bowel sounds present  EXTREMITIES:  No clubbing, cyanosis, or edema  PSYCH: Normal mood/affect; answers questions appropriately  NEUROLOGY: AAOx1; no focal deficits  SKIN: No rashes or lesions    LABS:                        11.6   11.5  )-----------( 437      ( 21 Nov 2017 05:45 )             36.7     WBC Trend: 11.5<--, 11.4<--, 10.9<--  11-21    144  |  102  |  49<H>  ----------------------------<  132<H>  4.5   |  24  |  1.64<H>    Ca    9.3      21 Nov 2017 05:45  Phos  3.6     11-21  Mg     2.3     11-21      Creatinine Trend: 1.64<--, 1.67<--, 1.82<--, 1.89<--, 1.91<--, 1.52<--              RADIOLOGY & ADDITIONAL TESTS:    Imaging Personally Reviewed:  CT chest: < from: CT Chest No Cont (11.21.17 @ 10:57) >  Large bilateral effusions with associated atelectasis.    Consultant(s) Notes Reviewed: Pulmonology, Cardiology, CT Surgery    Care Discussed with Consultants/Other Providers:    CONTACT #: [id number] Patient is a 87y old  Female who presents with a chief complaint of Shortness of breath, generalized weakness, malaise x 1 week (17 Nov 2017 17:02)      SUBJECTIVE / OVERNIGHT EVENTS:  Patient seen and examined at bedside. Yesterday pt had sustained afib x 40 minutes w/ HR in the 130s, given lopressor 5 IV x 1 w/ subsequent conversion to sinus. Was sinus rhythm 60-80s on tele overnight. Pt is poor historian but denies fever, SOB, CP, palpitations, abd pain, and body aches this AM.    MEDICATIONS  (STANDING):  amiodarone    Tablet 400 milliGRAM(s) Oral every 8 hours  apixaban 2.5 milliGRAM(s) Oral every 12 hours  atorvastatin 10 milliGRAM(s) Oral at bedtime  buDESOnide   0.5 milliGRAM(s) Respule 0.5 milliGRAM(s) Inhalation two times a day  cholecalciferol 2000 Unit(s) Oral daily  diltiazem    milliGRAM(s) Oral daily  furosemide    Tablet 20 milliGRAM(s) Oral daily  influenza   Vaccine 0.5 milliLiter(s) IntraMuscular once  levalbuterol Inhalation 0.63 milliGRAM(s) Inhalation every 6 hours  levETIRAcetam 250 milliGRAM(s) Oral daily  montelukast 10 milliGRAM(s) Oral daily  pantoprazole    Tablet 40 milliGRAM(s) Oral before breakfast  tiotropium 18 MICROgram(s) Capsule 1 Capsule(s) Inhalation daily    MEDICATIONS  (PRN):  docusate sodium 100 milliGRAM(s) Oral daily PRN Constipation  senna 2 Tablet(s) Oral at bedtime PRN Constipation      Vital Signs Last 24 Hrs  T(C): 36.7 (22 Nov 2017 05:38), Max: 36.8 (21 Nov 2017 21:19)  T(F): 98 (22 Nov 2017 05:38), Max: 98.2 (21 Nov 2017 21:19)  HR: 70 (22 Nov 2017 05:38) (66 - 133)  BP: 145/88 (22 Nov 2017 05:38) (100/58 - 145/88)  BP(mean): --  RR: 18 (22 Nov 2017 05:38) (18 - 19)  SpO2: 96% (22 Nov 2017 05:38) (95% - 98%)  CAPILLARY BLOOD GLUCOSE        I&O's Summary    20 Nov 2017 07:01  -  21 Nov 2017 07:00  --------------------------------------------------------  IN: 540 mL / OUT: 500 mL / NET: 40 mL    21 Nov 2017 07:01  -  22 Nov 2017 06:10  --------------------------------------------------------  IN: 240 mL / OUT: 50 mL / NET: 190 mL        PHYSICAL EXAM:  GENERAL: NAD, laying in bed, breathing comfortably on RA  HEAD: NC/AT  EYES:  PERRLA, conjunctiva and sclera clear  NECK: Supple, No JVD  CHEST/LUNG: Breath sounds CTA b/l; No wheezes, rales, or rhonchi  HEART: RRR; S1 and S2 present; +SM  ABDOMEN: Soft, Nontender, Nondistended; Bowel sounds present  EXTREMITIES:  No clubbing, cyanosis, or edema  PSYCH: Normal mood/affect; answers questions appropriately  NEUROLOGY: AAOx1; no focal deficits  SKIN: No rashes or lesions    LABS:                                   11.4   12.5  )-----------( 394      ( 22 Nov 2017 06:31 )             36.2     WBC Trend: 12.5<--, 11.5<--, 11.4<--, 10.9<--  11-22    145  |  105  |  56<H>  ----------------------------<  88  4.7   |  25  |  2.00<H>    Ca    9.5      22 Nov 2017 06:31  Phos  4.7     11-22  Mg     2.4     11-22    Creatinine Trend: 2.0-0<--, 1.64<--, 1.67<--, 1.82<--, 1.89<--, 1.91<--, 1.52<--              RADIOLOGY & ADDITIONAL TESTS:    Imaging Personally Reviewed:  CT chest: < from: CT Chest No Cont (11.21.17 @ 10:57) >  Large bilateral effusions with associated atelectasis.    Consultant(s) Notes Reviewed: Pulmonology, Cardiology, CT Surgery    Care Discussed with Consultants/Other Providers:    CONTACT #: [id number] Patient is a 87y old  Female who presents with a chief complaint of Shortness of breath, generalized weakness, malaise x 1 week (17 Nov 2017 17:02)    SUBJECTIVE / OVERNIGHT EVENTS:  Patient seen and examined at bedside. Yesterday pt had sustained afib x 40 minutes w/ HR in the 130s, given lopressor 5 IV x 1 w/ subsequent conversion to sinus. Was sinus rhythm 60-80s on tele overnight. Pt is poor historian but denies fever, SOB, CP, palpitations, abd pain, and body aches this AM.    MEDICATIONS  (STANDING):  amiodarone    Tablet 400 milliGRAM(s) Oral every 8 hours  apixaban 2.5 milliGRAM(s) Oral every 12 hours  atorvastatin 10 milliGRAM(s) Oral at bedtime  buDESOnide   0.5 milliGRAM(s) Respule 0.5 milliGRAM(s) Inhalation two times a day  cholecalciferol 2000 Unit(s) Oral daily  diltiazem    milliGRAM(s) Oral daily  furosemide    Tablet 20 milliGRAM(s) Oral daily  influenza   Vaccine 0.5 milliLiter(s) IntraMuscular once  levalbuterol Inhalation 0.63 milliGRAM(s) Inhalation every 6 hours  levETIRAcetam 250 milliGRAM(s) Oral daily  montelukast 10 milliGRAM(s) Oral daily  pantoprazole    Tablet 40 milliGRAM(s) Oral before breakfast  tiotropium 18 MICROgram(s) Capsule 1 Capsule(s) Inhalation daily    MEDICATIONS  (PRN):  docusate sodium 100 milliGRAM(s) Oral daily PRN Constipation  senna 2 Tablet(s) Oral at bedtime PRN Constipation    Vital Signs Last 24 Hrs  T(C): 36.7 (22 Nov 2017 05:38), Max: 36.8 (21 Nov 2017 21:19)  T(F): 98 (22 Nov 2017 05:38), Max: 98.2 (21 Nov 2017 21:19)  HR: 70 (22 Nov 2017 05:38) (66 - 133)  BP: 145/88 (22 Nov 2017 05:38) (100/58 - 145/88)  RR: 18 (22 Nov 2017 05:38) (18 - 19)  SpO2: 96% (22 Nov 2017 05:38) (95% - 98%)    I&O's Summary    20 Nov 2017 07:01 - 21 Nov 2017 07:00  --------------------------------------------------------  IN: 540 mL / OUT: 500 mL / NET: 40 mL    21 Nov 2017 07:01  -  22 Nov 2017 06:10  --------------------------------------------------------  IN: 240 mL / OUT: 50 mL / NET: 190 mL    PHYSICAL EXAM:  GENERAL: NAD, laying in bed, breathing comfortably on RA  HEAD: NC/AT  EYES:  PERRLA, conjunctiva and sclera clear  NECK: Supple, No JVD  CHEST/LUNG: Breath sounds CTA b/l; No wheezes, rales, or rhonchi  HEART: RRR; S1 and S2 present; +SM  ABDOMEN: Soft, Nontender, Nondistended; Bowel sounds present  EXTREMITIES:  No clubbing, cyanosis, or edema  PSYCH: Normal mood/affect; answers questions appropriately  NEUROLOGY: AAOx1; no focal deficits  SKIN: No rashes or lesions    LABS:                   11.4   12.5  )-----------( 394      ( 22 Nov 2017 06:31 )             36.2     WBC Trend: 12.5<--, 11.5<--, 11.4<--, 10.9<--  11-22    145  |  105  |  56<H>  ----------------------------<  88  4.7   |  25  |  2.00<H>    Ca    9.5      22 Nov 2017 06:31  Phos  4.7     11-22  Mg     2.4     11-22    Creatinine Trend: 2.0-0<--, 1.64<--, 1.67<--, 1.82<--, 1.89<--, 1.91<--, 1.52<--    RADIOLOGY & ADDITIONAL TESTS:    Imaging Personally Reviewed:  CT chest: < from: CT Chest No Cont (11.21.17 @ 10:57) >  Large bilateral effusions with associated atelectasis.    Consultant(s) Notes Reviewed: Pulmonology, Cardiology, CT Surgery    CONTACT #: [id number]

## 2017-11-22 NOTE — PROGRESS NOTE ADULT - PROBLEM SELECTOR PLAN 3
1.3 cm RLL pulmonary nodule was found on CXR  CT chest obtained for further evaluation showed large b/l pleural effusions w/ associated atelectasis; However, no evidence of pulm nodule

## 2017-11-22 NOTE — PROGRESS NOTE ADULT - PROBLEM SELECTOR PLAN 6
Patient w/ SCr 1.52 on admission (baseline SCr ~1.1); Likely 2/2 hypoperfusion  S/p 750 cc NS; Will not aggressively hydrate w/ IVF for now due to concern for acute HF exacerbation  Trend SCr  Renally dose medications

## 2017-11-23 LAB
ANION GAP SERPL CALC-SCNC: 16 MMOL/L — SIGNIFICANT CHANGE UP (ref 5–17)
BUN SERPL-MCNC: 53 MG/DL — HIGH (ref 7–23)
CALCIUM SERPL-MCNC: 9.7 MG/DL — SIGNIFICANT CHANGE UP (ref 8.4–10.5)
CHLORIDE SERPL-SCNC: 102 MMOL/L — SIGNIFICANT CHANGE UP (ref 96–108)
CO2 SERPL-SCNC: 26 MMOL/L — SIGNIFICANT CHANGE UP (ref 22–31)
CREAT SERPL-MCNC: 1.98 MG/DL — HIGH (ref 0.5–1.3)
GLUCOSE SERPL-MCNC: 108 MG/DL — HIGH (ref 70–99)
HCT VFR BLD CALC: 37.9 % — SIGNIFICANT CHANGE UP (ref 34.5–45)
HGB BLD-MCNC: 12.1 G/DL — SIGNIFICANT CHANGE UP (ref 11.5–15.5)
MAGNESIUM SERPL-MCNC: 2.3 MG/DL — SIGNIFICANT CHANGE UP (ref 1.6–2.6)
MCHC RBC-ENTMCNC: 27.9 PG — SIGNIFICANT CHANGE UP (ref 27–34)
MCHC RBC-ENTMCNC: 31.9 GM/DL — LOW (ref 32–36)
MCV RBC AUTO: 87.5 FL — SIGNIFICANT CHANGE UP (ref 80–100)
PHOSPHATE SERPL-MCNC: 3.7 MG/DL — SIGNIFICANT CHANGE UP (ref 2.5–4.5)
PLATELET # BLD AUTO: 401 K/UL — HIGH (ref 150–400)
POTASSIUM SERPL-MCNC: 4.1 MMOL/L — SIGNIFICANT CHANGE UP (ref 3.5–5.3)
POTASSIUM SERPL-SCNC: 4.1 MMOL/L — SIGNIFICANT CHANGE UP (ref 3.5–5.3)
RBC # BLD: 4.32 M/UL — SIGNIFICANT CHANGE UP (ref 3.8–5.2)
RBC # FLD: 14.6 % — HIGH (ref 10.3–14.5)
SODIUM SERPL-SCNC: 144 MMOL/L — SIGNIFICANT CHANGE UP (ref 135–145)
WBC # BLD: 11.5 K/UL — HIGH (ref 3.8–10.5)
WBC # FLD AUTO: 11.5 K/UL — HIGH (ref 3.8–10.5)

## 2017-11-23 PROCEDURE — 99233 SBSQ HOSP IP/OBS HIGH 50: CPT | Mod: GC

## 2017-11-23 RX ADMIN — LEVALBUTEROL 0.63 MILLIGRAM(S): 1.25 SOLUTION, CONCENTRATE RESPIRATORY (INHALATION) at 05:30

## 2017-11-23 RX ADMIN — APIXABAN 2.5 MILLIGRAM(S): 2.5 TABLET, FILM COATED ORAL at 17:01

## 2017-11-23 RX ADMIN — Medication 0.5 MILLIGRAM(S): at 17:01

## 2017-11-23 RX ADMIN — LEVALBUTEROL 0.63 MILLIGRAM(S): 1.25 SOLUTION, CONCENTRATE RESPIRATORY (INHALATION) at 23:03

## 2017-11-23 RX ADMIN — LEVETIRACETAM 250 MILLIGRAM(S): 250 TABLET, FILM COATED ORAL at 11:18

## 2017-11-23 RX ADMIN — PANTOPRAZOLE SODIUM 40 MILLIGRAM(S): 20 TABLET, DELAYED RELEASE ORAL at 05:29

## 2017-11-23 RX ADMIN — LEVALBUTEROL 0.63 MILLIGRAM(S): 1.25 SOLUTION, CONCENTRATE RESPIRATORY (INHALATION) at 11:17

## 2017-11-23 RX ADMIN — AMIODARONE HYDROCHLORIDE 400 MILLIGRAM(S): 400 TABLET ORAL at 05:29

## 2017-11-23 RX ADMIN — AMIODARONE HYDROCHLORIDE 400 MILLIGRAM(S): 400 TABLET ORAL at 22:57

## 2017-11-23 RX ADMIN — Medication 0.5 MILLIGRAM(S): at 05:29

## 2017-11-23 RX ADMIN — AMIODARONE HYDROCHLORIDE 400 MILLIGRAM(S): 400 TABLET ORAL at 14:47

## 2017-11-23 RX ADMIN — ATORVASTATIN CALCIUM 10 MILLIGRAM(S): 80 TABLET, FILM COATED ORAL at 22:57

## 2017-11-23 RX ADMIN — Medication 2000 UNIT(S): at 11:17

## 2017-11-23 RX ADMIN — MONTELUKAST 10 MILLIGRAM(S): 4 TABLET, CHEWABLE ORAL at 11:17

## 2017-11-23 RX ADMIN — APIXABAN 2.5 MILLIGRAM(S): 2.5 TABLET, FILM COATED ORAL at 05:29

## 2017-11-23 RX ADMIN — Medication 120 MILLIGRAM(S): at 05:29

## 2017-11-23 RX ADMIN — TIOTROPIUM BROMIDE 1 CAPSULE(S): 18 CAPSULE ORAL; RESPIRATORY (INHALATION) at 11:17

## 2017-11-23 RX ADMIN — LEVALBUTEROL 0.63 MILLIGRAM(S): 1.25 SOLUTION, CONCENTRATE RESPIRATORY (INHALATION) at 17:01

## 2017-11-23 NOTE — PROGRESS NOTE ADULT - SUBJECTIVE AND OBJECTIVE BOX
CARDIOLOGY FOLLOW UP NOTE - DR. URIARTE  (for dr. garay)    Subjective:    no cp, sob    PHYSICAL EXAM:  T(C): 36.7 (11-23-17 @ 12:01), Max: 37 (11-22-17 @ 14:15)  HR: 62 (11-23-17 @ 12:01) (62 - 72)  BP: 119/76 (11-23-17 @ 12:01) (116/69 - 163/69)  RR: 18 (11-23-17 @ 12:01) (18 - 19)  SpO2: 97% (11-23-17 @ 12:01) (94% - 100%)  Wt(kg): --  I&O's Summary    22 Nov 2017 07:01  -  23 Nov 2017 07:00  --------------------------------------------------------  IN: 200 mL / OUT: 120 mL / NET: 80 mL    23 Nov 2017 07:01  -  23 Nov 2017 12:24  --------------------------------------------------------  IN: 180 mL / OUT: 0 mL / NET: 180 mL        Appearance: Normal	  Cardiovascular: Normal S1 S2,RRR, + sm   Respiratory: Lungs clear to auscultation	dec bs bases  Gastrointestinal:  Soft, Non-tender, + BS	  Extremities: Normal range of motion, No clubbing, cyanosis or edema    MEDICATIONS  (STANDING):  amiodarone    Tablet 400 milliGRAM(s) Oral every 8 hours  apixaban 2.5 milliGRAM(s) Oral every 12 hours  atorvastatin 10 milliGRAM(s) Oral at bedtime  buDESOnide   0.5 milliGRAM(s) Respule 0.5 milliGRAM(s) Inhalation two times a day  cholecalciferol 2000 Unit(s) Oral daily  diltiazem    milliGRAM(s) Oral daily  influenza   Vaccine 0.5 milliLiter(s) IntraMuscular once  levalbuterol Inhalation 0.63 milliGRAM(s) Inhalation every 6 hours  levETIRAcetam 250 milliGRAM(s) Oral daily  montelukast 10 milliGRAM(s) Oral daily  pantoprazole    Tablet 40 milliGRAM(s) Oral before breakfast  tiotropium 18 MICROgram(s) Capsule 1 Capsule(s) Inhalation daily      TELEMETRY: 	    ECG:  	  RADIOLOGY:   DIAGNOSTIC TESTING:  [ ] Echocardiogram:  [ ] Catheterization:  [ ] Stress Test:    OTHER: 	    LABS:	 	    CARDIAC MARKERS:                                12.1   11.5  )-----------( 401      ( 23 Nov 2017 07:03 )             37.9     11-23    144  |  102  |  53<H>  ----------------------------<  108<H>  4.1   |  26  |  1.98<H>    Ca    9.7      23 Nov 2017 07:03  Phos  3.7     11-23  Mg     2.3     11-23      proBNP:     Lipid Profile:   HgA1c:

## 2017-11-23 NOTE — PROGRESS NOTE ADULT - PROBLEM SELECTOR PLAN 9
- Patient has dementia and is AAOx1 at baseline  - Fall precautions  - Aspiration precautions  Enhanced supervision

## 2017-11-23 NOTE — PROGRESS NOTE ADULT - PROBLEM SELECTOR PLAN 2
- Pt w/ severe AS and severe AR  - TTE from 1/2017 showing hyperdynamic LV, EF 75%, mild-moderate AS, severe AR, no segmental wall motion abnormality  - Repeat TTE obtained per cardiology to evaluate for valvular disease and pt's candidacy for TAVR showing mild MS, moderate-severe AS, severe AR, hyperdynamic LV systolic fx, and mild-moderate pulm regurg; However pt w/ underlying cognitive impairment  - Per CT surgery, given severe AI, TAVR not indicated, as AI is the prominent problem; BAV also contraindicated w/ severe AI. TTE to be reviewed to see if TAVR feasible

## 2017-11-23 NOTE — PROGRESS NOTE ADULT - PROBLEM SELECTOR PLAN 6
- Patient w/ SCr 1.52 on admission (baseline SCr ~1.1); Likely 2/2 hypoperfusion  S/p 750 cc NS; Will not aggressively hydrate w/ IVF for now due to concern for acute HF exacerbation  - Trend SCr, stable this AM  - Renally dose medications

## 2017-11-23 NOTE — PROGRESS NOTE ADULT - PROBLEM SELECTOR PLAN 7
- Patient w/ SOB x 1 wk, found to be hypoxic and tachypneic   - NC to maintain O2 sat > 90%; Will wean as tolerated  - C/w montelukast, budesonide, xopenex, and spiriva per pulm  - Pulmonary toilet-incentive spirometry, Chest Pt-acapella/chest vest up to 5x daily per pulm  - Wean off supplemental O2 as tolerated

## 2017-11-23 NOTE — PROGRESS NOTE ADULT - PROBLEM SELECTOR PLAN 3
- 1.3 cm RLL pulmonary nodule was found on CXR  - CT chest obtained for further evaluation showed large b/l pleural effusions w/ associated atelectasis; However, no evidence of pulm nodule

## 2017-11-23 NOTE — PROGRESS NOTE ADULT - PROBLEM SELECTOR PLAN 4
- Patient w/ hx of afib (not on AC), found to be in afib w/ RVR w/ HR up to 150s on assessment by EMS  - During this admission, patient has been in and out of sinus, currently in  -c/w cardizem 120 qd per cardiology  -on amio load w/ 400 q8h for 12 doses to be followed by maintenance dose of 200 qd per cardiology  -started eliquis 2.5 bid  -continue tele monitoring

## 2017-11-23 NOTE — PROGRESS NOTE ADULT - ATTENDING COMMENTS
Patient seen and examined. Family bedside.  F admitted with Acute on Chronic diastolic failure on Po Lasix currently, A.fib- rate controlled with Cardizem and Amiodarone.  TTE show severe AR and moderate- severe AS, as per CTS TAVR not indicated, given predominance of AI. Patient otherwise stable, continue with current management, dysphagic diet, monitor renal function.   Plan discussed at length with family.

## 2017-11-23 NOTE — PROGRESS NOTE ADULT - SUBJECTIVE AND OBJECTIVE BOX
Patient is a 87y old  Female who presents with a chief complaint of Shortness of breath, generalized weakness, malaise x 1 week (17 Nov 2017 17:02)      INTERVAL HPI/OVERNIGHT EVENTS: No acute events overnight. Patient denies CP, SOB, palpitations this AM.     T(C): 36.4 (11-23-17 @ 03:58), Max: 37 (11-22-17 @ 14:15)  HR: 65 (11-23-17 @ 03:58) (62 - 72)  BP: 163/69 (11-23-17 @ 03:58) (116/69 - 179/74)  RR: 18 (11-23-17 @ 03:58) (18 - 19)  SpO2: 98% (11-23-17 @ 03:58) (94% - 100%)  Wt(kg): --  I&O's Summary    22 Nov 2017 07:01  -  23 Nov 2017 07:00  --------------------------------------------------------  IN: 200 mL / OUT: 120 mL / NET: 80 mL    Telemetry: Sinus rhythm 60s-70s      LABS:                        12.1   11.5  )-----------( 401      ( 23 Nov 2017 07:03 )             37.9     11-23    144  |  102  |  53<H>  ----------------------------<  108<H>  4.1   |  26  |  1.98<H>    Ca    9.7      23 Nov 2017 07:03  Phos  3.7     11-23  Mg     2.3     11-23          CAPILLARY BLOOD GLUCOSE              REVIEW OF SYSTEMS:  CONSTITUTIONAL: No fever, weight loss, or fatigue  EYES: No eye pain, visual disturbances, or discharge  ENMT:  No difficulty hearing, tinnitus, vertigo; No sinus or throat pain  NECK: No pain or stiffness  BREASTS: No pain, masses, or nipple discharge  RESPIRATORY: No cough, wheezing, chills or hemoptysis; No shortness of breath  CARDIOVASCULAR: No chest pain, palpitations, dizziness, or leg swelling  GASTROINTESTINAL: No abdominal or epigastric pain. No nausea, vomiting, or hematemesis; No diarrhea or constipation. No melena or hematochezia.  GENITOURINARY: No dysuria, frequency, hematuria, or incontinence  NEUROLOGICAL: No headaches, memory loss, loss of strength, numbness, or tremors  SKIN: No itching, burning, rashes, or lesions   LYMPH NODES: No enlarged glands  ENDOCRINE: No heat or cold intolerance; No hair loss  MUSCULOSKELETAL: No joint pain or swelling; No muscle, back, or extremity pain  PSYCHIATRIC: No depression, anxiety, mood swings, or difficulty sleeping  HEME/LYMPH: No easy bruising, or bleeding gums  ALLERY AND IMMUNOLOGIC: No hives or eczema    RADIOLOGY & ADDITIONAL TESTS:    Imaging Personally Reviewed:  [ ] YES  [x ] NO    Consultant(s) Notes Reviewed:  [x ] YES  [ ] NO, Cardiology, Pulmonary    PHYSICAL EXAM:  GENERAL: NAD, laying in bed  HEAD:  Atraumatic, Normocephalic  EYES: EOMI, PERRLA, conjunctiva and sclera clear  ENMT: No tonsillar erythema, exudates, or enlargement  NECK: Supple   NERVOUS SYSTEM: Follows commands, no focal deficits  CHEST/LUNG: No rhonchi, wheezes, or rales  HEART: Regular rate and rhythm  ABDOMEN: Soft, Nontender, Nondistended; Bowel sounds present  EXTREMITIES:  2+ Peripheral Pulses, No clubbing, cyanosis, or edema  LYMPH: No lymphadenopathy noted  SKIN: No rashes or lesions    Care Discussed with Consultants/Other Providers [ ] YES  [x ] NO

## 2017-11-23 NOTE — PROGRESS NOTE ADULT - PROBLEM SELECTOR PLAN 1
- Patient originally p/w SOB x 1 week w/ signs of overload on physical exam, elevated pro-BNP, and CXR concerning for acute HF exacerbation  - Given lasix 20 IV x 1 per cards recs  - Volume status appears to be improving; Presently off IV lasix and on lasix 20 PO per cardiology  - Strict I+Os for goal net negative 500cc to 1L daily per cards recs; Will diurese as needed to meet goal with close monitoring  - Monitor BMP, keep K above 4 and Mg above 2 per pulm  - Daily weights  - Wean supplemental O2 as tolerated

## 2017-11-23 NOTE — PROGRESS NOTE ADULT - PROBLEM SELECTOR PLAN 5
- On admission, patient w/ troponin 0.11, likely 2/2 demand ischemia (Type II NSTEMI); ACS less likely given no ischemic changes on EKG  Second troponin 0.12, third troponin 0.16  - D/philip asa 81 per cardiology recommendations; C/w home atorvastatin

## 2017-11-24 ENCOUNTER — TRANSCRIPTION ENCOUNTER (OUTPATIENT)
Age: 82
End: 2017-11-24

## 2017-11-24 LAB
ANION GAP SERPL CALC-SCNC: 13 MMOL/L — SIGNIFICANT CHANGE UP (ref 5–17)
BUN SERPL-MCNC: 50 MG/DL — HIGH (ref 7–23)
CALCIUM SERPL-MCNC: 9.7 MG/DL — SIGNIFICANT CHANGE UP (ref 8.4–10.5)
CHLORIDE SERPL-SCNC: 104 MMOL/L — SIGNIFICANT CHANGE UP (ref 96–108)
CO2 SERPL-SCNC: 25 MMOL/L — SIGNIFICANT CHANGE UP (ref 22–31)
CREAT SERPL-MCNC: 1.9 MG/DL — HIGH (ref 0.5–1.3)
GLUCOSE SERPL-MCNC: 104 MG/DL — HIGH (ref 70–99)
HCT VFR BLD CALC: 36.2 % — SIGNIFICANT CHANGE UP (ref 34.5–45)
HGB BLD-MCNC: 11.2 G/DL — LOW (ref 11.5–15.5)
MAGNESIUM SERPL-MCNC: 2.3 MG/DL — SIGNIFICANT CHANGE UP (ref 1.6–2.6)
MCHC RBC-ENTMCNC: 27.1 PG — SIGNIFICANT CHANGE UP (ref 27–34)
MCHC RBC-ENTMCNC: 30.9 GM/DL — LOW (ref 32–36)
MCV RBC AUTO: 87.6 FL — SIGNIFICANT CHANGE UP (ref 80–100)
PHOSPHATE SERPL-MCNC: 3.1 MG/DL — SIGNIFICANT CHANGE UP (ref 2.5–4.5)
PLATELET # BLD AUTO: 377 K/UL — SIGNIFICANT CHANGE UP (ref 150–400)
POTASSIUM SERPL-MCNC: 4.8 MMOL/L — SIGNIFICANT CHANGE UP (ref 3.5–5.3)
POTASSIUM SERPL-SCNC: 4.8 MMOL/L — SIGNIFICANT CHANGE UP (ref 3.5–5.3)
RBC # BLD: 4.13 M/UL — SIGNIFICANT CHANGE UP (ref 3.8–5.2)
RBC # FLD: 14.5 % — SIGNIFICANT CHANGE UP (ref 10.3–14.5)
SODIUM SERPL-SCNC: 142 MMOL/L — SIGNIFICANT CHANGE UP (ref 135–145)
WBC # BLD: 11.9 K/UL — HIGH (ref 3.8–10.5)
WBC # FLD AUTO: 11.9 K/UL — HIGH (ref 3.8–10.5)

## 2017-11-24 PROCEDURE — 99233 SBSQ HOSP IP/OBS HIGH 50: CPT | Mod: GC

## 2017-11-24 PROCEDURE — 99233 SBSQ HOSP IP/OBS HIGH 50: CPT

## 2017-11-24 RX ORDER — LEVETIRACETAM 250 MG/1
1 TABLET, FILM COATED ORAL
Qty: 0 | Refills: 0 | DISCHARGE
Start: 2017-11-24

## 2017-11-24 RX ORDER — LEVALBUTEROL 1.25 MG/.5ML
3 SOLUTION, CONCENTRATE RESPIRATORY (INHALATION)
Qty: 0 | Refills: 0 | DISCHARGE
Start: 2017-11-24

## 2017-11-24 RX ORDER — MONTELUKAST 4 MG/1
1 TABLET, CHEWABLE ORAL
Qty: 0 | Refills: 0 | DISCHARGE
Start: 2017-11-24

## 2017-11-24 RX ORDER — CHOLECALCIFEROL (VITAMIN D3) 125 MCG
2000 CAPSULE ORAL
Qty: 0 | Refills: 0 | DISCHARGE
Start: 2017-11-24

## 2017-11-24 RX ORDER — CHOLECALCIFEROL (VITAMIN D3) 125 MCG
1 CAPSULE ORAL
Qty: 0 | Refills: 0 | COMMUNITY

## 2017-11-24 RX ORDER — MONTELUKAST 4 MG/1
1 TABLET, CHEWABLE ORAL
Qty: 0 | Refills: 0 | COMMUNITY

## 2017-11-24 RX ORDER — APIXABAN 2.5 MG/1
1 TABLET, FILM COATED ORAL
Qty: 0 | Refills: 0 | DISCHARGE
Start: 2017-11-24

## 2017-11-24 RX ORDER — PANTOPRAZOLE SODIUM 20 MG/1
1 TABLET, DELAYED RELEASE ORAL
Qty: 0 | Refills: 0 | DISCHARGE
Start: 2017-11-24

## 2017-11-24 RX ORDER — BUDESONIDE, MICRONIZED 100 %
1 POWDER (GRAM) MISCELLANEOUS
Qty: 0 | Refills: 0 | DISCHARGE
Start: 2017-11-24

## 2017-11-24 RX ORDER — SENNA PLUS 8.6 MG/1
2 TABLET ORAL
Qty: 0 | Refills: 0 | DISCHARGE
Start: 2017-11-24

## 2017-11-24 RX ORDER — LEVALBUTEROL 1.25 MG/.5ML
3 SOLUTION, CONCENTRATE RESPIRATORY (INHALATION)
Qty: 0 | Refills: 0 | COMMUNITY

## 2017-11-24 RX ORDER — DILTIAZEM HCL 120 MG
1 CAPSULE, EXT RELEASE 24 HR ORAL
Qty: 0 | Refills: 0 | COMMUNITY
Start: 2017-11-24

## 2017-11-24 RX ORDER — DOCUSATE SODIUM 100 MG
1 CAPSULE ORAL
Qty: 0 | Refills: 0 | DISCHARGE
Start: 2017-11-24

## 2017-11-24 RX ORDER — ATORVASTATIN CALCIUM 80 MG/1
1 TABLET, FILM COATED ORAL
Qty: 0 | Refills: 0 | DISCHARGE
Start: 2017-11-24

## 2017-11-24 RX ORDER — AMIODARONE HYDROCHLORIDE 400 MG/1
2 TABLET ORAL
Qty: 0 | Refills: 0 | COMMUNITY
Start: 2017-11-24 | End: 2017-11-24

## 2017-11-24 RX ORDER — BUDESONIDE, MICRONIZED 100 %
2 POWDER (GRAM) MISCELLANEOUS
Qty: 0 | Refills: 0 | COMMUNITY

## 2017-11-24 RX ORDER — DILTIAZEM HCL 120 MG
1 CAPSULE, EXT RELEASE 24 HR ORAL
Qty: 0 | Refills: 0 | COMMUNITY

## 2017-11-24 RX ORDER — TIOTROPIUM BROMIDE 18 UG/1
1 CAPSULE ORAL; RESPIRATORY (INHALATION)
Qty: 0 | Refills: 0 | DISCHARGE
Start: 2017-11-24

## 2017-11-24 RX ORDER — LEVETIRACETAM 250 MG/1
1 TABLET, FILM COATED ORAL
Qty: 0 | Refills: 0 | COMMUNITY

## 2017-11-24 RX ADMIN — APIXABAN 2.5 MILLIGRAM(S): 2.5 TABLET, FILM COATED ORAL at 17:32

## 2017-11-24 RX ADMIN — LEVALBUTEROL 0.63 MILLIGRAM(S): 1.25 SOLUTION, CONCENTRATE RESPIRATORY (INHALATION) at 17:32

## 2017-11-24 RX ADMIN — PANTOPRAZOLE SODIUM 40 MILLIGRAM(S): 20 TABLET, DELAYED RELEASE ORAL at 06:19

## 2017-11-24 RX ADMIN — LEVALBUTEROL 0.63 MILLIGRAM(S): 1.25 SOLUTION, CONCENTRATE RESPIRATORY (INHALATION) at 11:27

## 2017-11-24 RX ADMIN — MONTELUKAST 10 MILLIGRAM(S): 4 TABLET, CHEWABLE ORAL at 11:27

## 2017-11-24 RX ADMIN — Medication 0.5 MILLIGRAM(S): at 17:32

## 2017-11-24 RX ADMIN — LEVALBUTEROL 0.63 MILLIGRAM(S): 1.25 SOLUTION, CONCENTRATE RESPIRATORY (INHALATION) at 23:29

## 2017-11-24 RX ADMIN — AMIODARONE HYDROCHLORIDE 400 MILLIGRAM(S): 400 TABLET ORAL at 06:19

## 2017-11-24 RX ADMIN — Medication 1 TABLET(S): at 11:27

## 2017-11-24 RX ADMIN — ATORVASTATIN CALCIUM 10 MILLIGRAM(S): 80 TABLET, FILM COATED ORAL at 23:29

## 2017-11-24 RX ADMIN — AMIODARONE HYDROCHLORIDE 400 MILLIGRAM(S): 400 TABLET ORAL at 13:09

## 2017-11-24 RX ADMIN — Medication 120 MILLIGRAM(S): at 06:19

## 2017-11-24 RX ADMIN — APIXABAN 2.5 MILLIGRAM(S): 2.5 TABLET, FILM COATED ORAL at 06:19

## 2017-11-24 RX ADMIN — LEVETIRACETAM 250 MILLIGRAM(S): 250 TABLET, FILM COATED ORAL at 11:27

## 2017-11-24 RX ADMIN — Medication 0.5 MILLIGRAM(S): at 06:19

## 2017-11-24 RX ADMIN — TIOTROPIUM BROMIDE 1 CAPSULE(S): 18 CAPSULE ORAL; RESPIRATORY (INHALATION) at 11:27

## 2017-11-24 RX ADMIN — AMIODARONE HYDROCHLORIDE 400 MILLIGRAM(S): 400 TABLET ORAL at 23:29

## 2017-11-24 RX ADMIN — Medication 2000 UNIT(S): at 11:27

## 2017-11-24 RX ADMIN — LEVALBUTEROL 0.63 MILLIGRAM(S): 1.25 SOLUTION, CONCENTRATE RESPIRATORY (INHALATION) at 06:19

## 2017-11-24 NOTE — DISCHARGE NOTE ADULT - ADDITIONAL INSTRUCTIONS
Please follow up with your primary care provider within one week of your discharge from the hospital as well as a cardiologist and pulmonologist.

## 2017-11-24 NOTE — DIETITIAN INITIAL EVALUATION ADULT. - PROBLEM SELECTOR PLAN 4
Patient w/ SCr 1.52 on admission (baseline SCr ~1.1); Likely 2/2 shock  S/p 750 cc NS; Will not aggressively hydrate w/ IVF for now due to concern for acute HF exacerbation  Trend SCr  Renally dose medications  Avoid nephrotoxins

## 2017-11-24 NOTE — DISCHARGE NOTE ADULT - CARE PROVIDER_API CALL
Edwin Briones), Internal Medicine; Pulmonary Disease  [address]  [address]  [place] [address]  Phone: [phone]  Fax: [phone]    Jake Macias), Cardiovascular Disease; Internal Medicine; Nuclear Cardiology  [address]  [place], [place] [id number]  Phone: [phone]  Fax: [phone]

## 2017-11-24 NOTE — DISCHARGE NOTE ADULT - MEDICATION SUMMARY - MEDICATIONS TO TAKE
I will START or STAY ON the medications listed below when I get home from the hospital:    Pulmicort Respules 0.5 mg/2 mL inhalation suspension  -- 1 cap(s) inhaled once a day  -- Indication: For COPD (chronic obstructive pulmonary disease)    dilTIAZem 120 mg/24 hours oral capsule, extended release  -- 1 cap(s) by mouth once a day  -- Indication: For Atrial fibrillation with RVR    amiodarone 200 mg oral tablet  -- 2 tab(s) by mouth every 8 hours x 1dose in the evening of 11/24 (this will be the last dose of PO 400mg!)  -- Indication: For Atrial fibrillation with RVR    amiodarone 200 mg oral tablet  -- 1 tab(s) by mouth once a day to be started on 11/25/17 (after discontinuation of prescription for Amiodarone 400mg PO Q8H)  -- Indication: For Atrial fibrillation with RVR    apixaban 2.5 mg oral tablet  -- 1 tab(s) by mouth every 12 hours  -- Indication: For Atrial fibrillation with RVR    Keppra 250 mg oral tablet  -- 1 tab(s) by mouth once a day  -- Indication: For Epilepsy    atorvastatin 10 mg oral tablet  -- 1 tab(s) by mouth once a day (at bedtime)  -- Indication: For Acute diastolic heart failure    levalbuterol 0.63 mg/3 mL inhalation solution  -- 3 milliliter(s) inhaled every 6 hours, As Needed  -- Indication: For COPD (chronic obstructive pulmonary disease)    tiotropium 18 mcg inhalation capsule  -- 1 cap(s) inhaled once a day  -- Indication: For COPD (chronic obstructive pulmonary disease)    senna oral tablet  -- 2 tab(s) by mouth once a day (at bedtime), As needed, Constipation  -- Indication: For Constipation    docusate sodium 100 mg oral capsule  -- 1 cap(s) by mouth once a day, As needed, Constipation  -- Indication: For Constipation    montelukast 10 mg oral tablet  -- 1 tab(s) by mouth once a day  -- Indication: For COPD (chronic obstructive pulmonary disease)    pantoprazole 40 mg oral delayed release tablet  -- 1 tab(s) by mouth once a day (before a meal)  -- Indication: For GERD    Multiple Vitamins oral tablet  -- 1 tab(s) by mouth once a day  -- Indication: For Prophylactic measure    cholecalciferol oral tablet  -- 2000 unit(s) by mouth once a day  -- Indication: For Prophylactic measure I will START or STAY ON the medications listed below when I get home from the hospital:    Pulmicort Respules 0.5 mg/2 mL inhalation suspension  -- 1 cap(s) inhaled once a day  -- Indication: For COPD (chronic obstructive pulmonary disease)    dilTIAZem 120 mg/24 hours oral capsule, extended release  -- 1 cap(s) by mouth once a day  -- Indication: For Atrial fibrillation with RVR    amiodarone 200 mg oral tablet  -- 1 tab(s) by mouth once a day to be started on 11/25/17  -- Indication: For Atrial fibrillation with RVR    apixaban 2.5 mg oral tablet  -- 1 tab(s) by mouth every 12 hours  -- Indication: For Atrial fibrillation with RVR    Keppra 250 mg oral tablet  -- 1 tab(s) by mouth once a day  -- Indication: For Epilepsy    atorvastatin 10 mg oral tablet  -- 1 tab(s) by mouth once a day (at bedtime)  -- Indication: For Acute diastolic heart failure    levalbuterol 0.63 mg/3 mL inhalation solution  -- 3 milliliter(s) inhaled every 6 hours, As Needed  -- Indication: For COPD (chronic obstructive pulmonary disease)    tiotropium 18 mcg inhalation capsule  -- 1 cap(s) inhaled once a day  -- Indication: For COPD (chronic obstructive pulmonary disease)    senna oral tablet  -- 2 tab(s) by mouth once a day (at bedtime), As needed, Constipation  -- Indication: For Constipation    docusate sodium 100 mg oral capsule  -- 1 cap(s) by mouth once a day, As needed, Constipation  -- Indication: For Constipation    montelukast 10 mg oral tablet  -- 1 tab(s) by mouth once a day  -- Indication: For COPD (chronic obstructive pulmonary disease)    pantoprazole 40 mg oral delayed release tablet  -- 1 tab(s) by mouth once a day (before a meal)  -- Indication: For GERD    Multiple Vitamins oral tablet  -- 1 tab(s) by mouth once a day  -- Indication: For Prophylactic measure    cholecalciferol oral tablet  -- 2000 unit(s) by mouth once a day  -- Indication: For Prophylactic measure I will START or STAY ON the medications listed below when I get home from the hospital:    Pulmicort Respules 0.5 mg/2 mL inhalation suspension  -- 1 cap(s) inhaled once a day  -- Indication: For COPD (chronic obstructive pulmonary disease)    amiodarone 200 mg oral tablet  -- 1 tab(s) by mouth once a day to be started on 11/25/17  -- Indication: For Atrial fibrillation with RVR    dilTIAZem 180 mg/24 hours oral capsule, extended release  -- 1 cap(s) by mouth once a day  -- Indication: For Atrial fibrillation with RVR    apixaban 2.5 mg oral tablet  -- 1 tab(s) by mouth every 12 hours  -- Indication: For Atrial fibrillation with RVR    Keppra 250 mg oral tablet  -- 1 tab(s) by mouth once a day  -- Indication: For Epilepsy    atorvastatin 10 mg oral tablet  -- 1 tab(s) by mouth once a day (at bedtime)  -- Indication: For Acute diastolic heart failure    levalbuterol 0.63 mg/3 mL inhalation solution  -- 3 milliliter(s) inhaled every 6 hours, As Needed  -- Indication: For COPD (chronic obstructive pulmonary disease)    tiotropium 18 mcg inhalation capsule  -- 1 cap(s) inhaled once a day  -- Indication: For COPD (chronic obstructive pulmonary disease)    hydroCHLOROthiazide 12.5 mg oral capsule  -- 1 cap(s) by mouth every other day  -- Indication: For Hypertension    senna oral tablet  -- 2 tab(s) by mouth once a day (at bedtime), As needed, Constipation  -- Indication: For Constipation    docusate sodium 100 mg oral capsule  -- 1 cap(s) by mouth once a day, As needed, Constipation  -- Indication: For Constipation    montelukast 10 mg oral tablet  -- 1 tab(s) by mouth once a day  -- Indication: For COPD (chronic obstructive pulmonary disease)    pantoprazole 40 mg oral delayed release tablet  -- 1 tab(s) by mouth once a day (before a meal)  -- Indication: For GERD    Multiple Vitamins oral tablet  -- 1 tab(s) by mouth once a day  -- Indication: For Prophylactic measure    cholecalciferol oral tablet  -- 2000 unit(s) by mouth once a day  -- Indication: For Prophylactic measure

## 2017-11-24 NOTE — DISCHARGE NOTE ADULT - PATIENT PORTAL LINK FT
“You can access the Follow[facility] Patient Portal, offered by [facility], by registering with the following website: [email or web address]”

## 2017-11-24 NOTE — PROGRESS NOTE ADULT - PROBLEM SELECTOR PLAN 10
DVT PPX: Eliquis 2.5 bid  GI PPX: PPI pre-breakfast, dysphagia 3 diet (DASH/TLC) w/ no concentrated phosphorus    Dispo: PT recommending d/c to [facility], pt's daughter on board DVT PPX: Eliquis 2.5 bid  GI PPX: PPI pre-breakfast, dysphagia 3 diet (DASH/TLC) w/ no concentrated phosphorus    Dispo: PT recommending d/c to [facility], pt's daughter on board -- will touch base with case management re: plan for discharge.

## 2017-11-24 NOTE — PROGRESS NOTE ADULT - PROBLEM SELECTOR PLAN 7
- Patient w/ SOB x 1 wk, found to be hypoxic and tachypneic   - NC to maintain O2 sat > 90%; Wean as tolerated  - C/w montelukast, budesonide, xopenex, and spiriva per pulm  - Pulmonary toilet-incentive spirometry, Chest Pt-acapella/chest vest up to 5x daily per pulm  - Will need PFTs/TFTs, LFTs outpatient per pulm

## 2017-11-24 NOTE — DISCHARGE NOTE ADULT - SECONDARY DIAGNOSIS.
Aortic valve disease Atrial fibrillation with RVR Non-ST elevation myocardial infarction (NSTEMI), type 2 Pulmonary nodule COPD (chronic obstructive pulmonary disease) Epilepsy Acute kidney injury

## 2017-11-24 NOTE — DIETITIAN INITIAL EVALUATION ADULT. - OTHER INFO
seen for length of stay. pt with dementia, unable to effectively interview. consuming >75% of meals as per PCA at bedside. last BM yesterday as per PCA who was overing pt. NKFA

## 2017-11-24 NOTE — PROGRESS NOTE ADULT - PROBLEM SELECTOR PLAN 6
- Patient w/ SCr 1.52 on admission (baseline SCr ~1.1); Likely 2/2 hypoperfusion  S/p 750 cc NS; Will not aggressively hydrate w/ IVF for now due to concern for acute HF exacerbation  - Trend SCr, _ this AM  - Renally dose medications - Patient w/ SCr 1.52 on admission (baseline SCr ~1.1); Likely 2/2 hypoperfusion  S/p 750 cc NS; Will not aggressively hydrate w/ IVF for now due to concern for acute HF exacerbation  - Trend SCr, downtrending to 1.9 this AM  - Renally dose medications

## 2017-11-24 NOTE — PROGRESS NOTE ADULT - PROBLEM SELECTOR PLAN 1
- Patient originally p/w SOB x 1 week w/ signs of overload on physical exam, elevated pro-BNP, and CXR concerning for acute HF exacerbation  - Volume status improved; Monitoring off lasix per cardiology  - Strict I+Os for goal net negative 500cc to 1L daily per cards recs; Will diurese if needed to meet goal with close monitoring  - Monitor BMP, keep K above 4 and Mg above 2 per pulm  - Daily weights  - Wean supplemental O2 as tolerated

## 2017-11-24 NOTE — DISCHARGE NOTE ADULT - REASON FOR ADMISSION
Shortness of breath, generalized weakness, malaise x 1 week Shortness of breath, generalized weakness, malaise for 1 week

## 2017-11-24 NOTE — DISCHARGE NOTE ADULT - PLAN OF CARE
Please follow up with a primary care provider as well as a cardiologist after discharge. You came to the hospital with shortness of breath and were found on chest x-ray to have fluid in your lungs, likely due to acutely worsening heart failure. You were treated with diuretics in the hospital and improved. Please seek medical attention if you develop worsening shortness of breath or leg swelling. Please follow up with your primary care provider within one week of your discharge from the hospital as well as a cardiologist. Transthoracic echocardiogram showed that you have moderate to severe aortic stenosis and severe aortic regurgitation. You are not a candidate for surgical treatment and will be managed medically. Please follow up with your primary care provider within one week of your discharge from the hospital as well as a cardiologist. You have a history of atrial fibrillation which means that your heart rate and rhythm are irregular. Please take your medications as prescribed. Please seek medical attention if you notice your heart is beating too fast. Please follow up with your primary care provider within one week of your discharge from the hospital as well as a cardiologist. You were found to have elevated cardiac markers in your blood which is an indicator of heart damage. Your cardiac markers were likely increased due to your being in a state of acute illness. Please take your medication as prescribed. Please seek medical attention if you develop chest pain, sweating, nausea, vomiting, or left shoulder, arm, neck, or jaw pain. Please follow up with your primary care provider within one week of your discharge from the hospital as well as a cardiologist. Please follow up with a primary care provider as well as a pulmonologist after discharge. You were found to have a 1.3 centimeter nodular opacity in the lower lobe of your right lung on chest x-ray. Please follow up with your primary care provider within one week of your discharge from the hospital as well as a pulmonologist. You have a history of chronic obstructive pulmonary disease. Please take your medications as prescribed. Please seek medical attention if you develop worsening cough, wheezing, or shortness of breath. Please follow up with your primary care provider within one week of your discharge from the hospital as well as a pulmonologist. Please follow up with a primary care provider after discharge. You were found to have an elevated level of creatinine in your blood which indicates impaired kidney function. Your kidney function may have been impaired from reduced blood flow in the setting of acute illness. Please follow up with a primary care provider within one week of your discharge from the hospital and have your creatinine level rechecked at that visit.

## 2017-11-24 NOTE — CHART NOTE - NSCHARTNOTEFT_GEN_A_CORE
Notified about brief sinus bradycardia down to 46 noted on tele. Back up to 50s on tele. Patient sleeping and vital signs otherwise stable: BP with systolic in 150s, saturating well on low flow NC.    Tre Ceja MD   PGY1  NF  Pager: [phone]

## 2017-11-24 NOTE — PROGRESS NOTE ADULT - ATTENDING COMMENTS
Patient stable for discharge today. As per case management, planning initiated for discharge to [facility] as recommended by PT. Will update and clarify plan for management with the patient's daughter/proxy. Total discharge time spent = 45minutes.

## 2017-11-24 NOTE — DISCHARGE NOTE ADULT - CARE PROVIDERS DIRECT ADDRESSES
[email or web address]

## 2017-11-24 NOTE — PROGRESS NOTE ADULT - SUBJECTIVE AND OBJECTIVE BOX
Patient is a 87y old  Female who presents with a chief complaint of Shortness of breath, generalized weakness, malaise x 1 week (2017 17:02)    She remains confused. She denies c/o chest pain, SOB or palpitations.    Allergies    No Known Allergies    Intolerances      MEDICATIONS  (STANDING):  amiodarone    Tablet 400 milliGRAM(s) Oral every 8 hours  apixaban 2.5 milliGRAM(s) Oral every 12 hours  atorvastatin 10 milliGRAM(s) Oral at bedtime  buDESOnide   0.5 milliGRAM(s) Respule 0.5 milliGRAM(s) Inhalation two times a day  cholecalciferol 2000 Unit(s) Oral daily  diltiazem    milliGRAM(s) Oral daily  influenza   Vaccine 0.5 milliLiter(s) IntraMuscular once  levalbuterol Inhalation 0.63 milliGRAM(s) Inhalation every 6 hours  levETIRAcetam 250 milliGRAM(s) Oral daily  montelukast 10 milliGRAM(s) Oral daily  pantoprazole    Tablet 40 milliGRAM(s) Oral before breakfast  tiotropium 18 MICROgram(s) Capsule 1 Capsule(s) Inhalation daily    MEDICATIONS  (PRN):  docusate sodium 100 milliGRAM(s) Oral daily PRN Constipation  senna 2 Tablet(s) Oral at bedtime PRN Constipation      PHYSICAL EXAM:  Vital Signs Last 24 Hrs  T(C): 36.8 (2017 05:09), Max: 36.8 (2017 05:09)  T(F): 98.3 (2017 05:09), Max: 98.3 (2017 05:09)  HR: 71 (2017 05:09) (62 - 71)  BP: 159/66 (2017 05:09) (119/76 - 159/66)  BP(mean): --  RR: 18 (2017 05:09) (18 - 18)  SpO2: 97% (2017 05:09) (96% - 97%)  Daily     Daily Weight in k.1 (2017 05:09)  I&O's Summary    2017 07:01  -  2017 07:00  --------------------------------------------------------  IN: 200 mL / OUT: 120 mL / NET: 80 mL    2017 07:01  -  2017 06:56  --------------------------------------------------------  IN: 300 mL / OUT: 0 mL / NET: 300 mL    General Appearance: NAD  HEENT: normocephalic, atraumatic  Neck: no JVD,  carotid 2+  bilaterally with transmitted murmur  Lungs:  increased expiratory phase bilaterally  Cor:  pmi 5th ICS MCL,regular rate and rhythm, S1 normal intensity, S2 decreased intensity, Grade II/VI mid to late peaking crescendo decrescendo systolic murmur ULSB. Grade II/Vi diastolic murmur ULSB  Abdomen: soft, non-tender; bowel sounds normal; no masses,  no organomegaly  Extremities: without cyanosis, clubbing or edema  Vasc: 2-+ PT and DP pulses; LE varicosities    Telemetry: NSR    Labs:  CBC Full  -  ( 2017 06:24 )  WBC Count : 11.9 K/uL  Hemoglobin : 11.2 g/dL  Hematocrit : 36.2 %  Platelet Count - Automated : 377 K/uL  Mean Cell Volume : 87.6 fl  Mean Cell Hemoglobin : 27.1 pg  Mean Cell Hemoglobin Concentration : 30.9 gm/dL  Auto Neutrophil # : x  Auto Lymphocyte # : x  Auto Monocyte # : x  Auto Eosinophil # : x  Auto Basophil # : x  Auto Neutrophil % : x  Auto Lymphocyte % : x  Auto Monocyte % : x  Auto Eosinophil % : x  Auto Basophil % : x        142  |  104  |  50<H>  ----------------------------<  104<H>  4.8   |  25  |  1.90<H>    Ca    9.7      2017 06:24  Phos  3.1     11-24  Mg     2.3     -24                              Jake Macias MD [facility]  [phone]

## 2017-11-24 NOTE — DIETITIAN INITIAL EVALUATION ADULT. - DIET TYPE
dysphagia 3, soft, nectar consistency fluid/no concentrated phosphorus/dysphagia 3, soft, thin liquids/DASH/TLC (sodium and cholesterol restricted diet)

## 2017-11-24 NOTE — DISCHARGE NOTE ADULT - CARE PLAN
Principal Discharge DX:	Acute on chronic diastolic congestive heart failure  Secondary Diagnosis:	Aortic valve disease  Secondary Diagnosis:	Atrial fibrillation with RVR  Secondary Diagnosis:	Non-ST elevation myocardial infarction (NSTEMI), type 2  Secondary Diagnosis:	Pulmonary nodule  Secondary Diagnosis:	COPD (chronic obstructive pulmonary disease)  Secondary Diagnosis:	Epilepsy Principal Discharge DX:	Acute on chronic diastolic congestive heart failure  Goal:	Please follow up with a primary care provider as well as a cardiologist after discharge.  Instructions for follow-up, activity and diet:	You came to the hospital with shortness of breath and were found on chest x-ray to have fluid in your lungs, likely due to acutely worsening heart failure. You were treated with diuretics in the hospital and improved. Please seek medical attention if you develop worsening shortness of breath or leg swelling. Please follow up with your primary care provider within one week of your discharge from the hospital as well as a cardiologist.  Secondary Diagnosis:	Aortic valve disease  Goal:	Please follow up with a primary care provider as well as a cardiologist after discharge.  Instructions for follow-up, activity and diet:	Transthoracic echocardiogram showed that you have moderate to severe aortic stenosis and severe aortic regurgitation. You are not a candidate for surgical treatment and will be managed medically. Please follow up with your primary care provider within one week of your discharge from the hospital as well as a cardiologist.  Secondary Diagnosis:	Atrial fibrillation with RVR  Goal:	Please follow up with a primary care provider as well as a cardiologist after discharge.  Instructions for follow-up, activity and diet:	You have a history of atrial fibrillation which means that your heart rate and rhythm are irregular. Please take your medications as prescribed. Please seek medical attention if you notice your heart is beating too fast. Please follow up with your primary care provider within one week of your discharge from the hospital as well as a cardiologist.  Secondary Diagnosis:	Non-ST elevation myocardial infarction (NSTEMI), type 2  Goal:	Please follow up with a primary care provider as well as a cardiologist after discharge.  Instructions for follow-up, activity and diet:	You were found to have elevated cardiac markers in your blood which is an indicator of heart damage. Your cardiac markers were likely increased due to your being in a state of acute illness. Please take your medication as prescribed. Please seek medical attention if you develop chest pain, sweating, nausea, vomiting, or left shoulder, arm, neck, or jaw pain. Please follow up with your primary care provider within one week of your discharge from the hospital as well as a cardiologist.  Secondary Diagnosis:	Pulmonary nodule  Goal:	Please follow up with a primary care provider as well as a pulmonologist after discharge.  Instructions for follow-up, activity and diet:	You were found to have a 1.3 centimeter nodular opacity in the lower lobe of your right lung on chest x-ray. Please follow up with your primary care provider within one week of your discharge from the hospital as well as a pulmonologist.  Secondary Diagnosis:	COPD (chronic obstructive pulmonary disease)  Goal:	Please follow up with a primary care provider as well as a pulmonologist after discharge.  Instructions for follow-up, activity and diet:	You have a history of chronic obstructive pulmonary disease. Please take your medications as prescribed. Please seek medical attention if you develop worsening cough, wheezing, or shortness of breath. Please follow up with your primary care provider within one week of your discharge from the hospital as well as a pulmonologist.  Secondary Diagnosis:	Acute kidney injury  Goal:	Please follow up with a primary care provider after discharge.  Instructions for follow-up, activity and diet:	You were found to have an elevated level of creatinine in your blood which indicates impaired kidney function. Your kidney function may have been impaired from reduced blood flow in the setting of acute illness. Please follow up with a primary care provider within one week of your discharge from the hospital and have your creatinine level rechecked at that visit.

## 2017-11-24 NOTE — PROGRESS NOTE ADULT - ATTENDING COMMENTS
as above--situation is most c/w CHF and not PNA--no signs or sx to support (CXR c/w APE and not PNA)  Restarted on xopenex/pulmicort/spiriva; continue O2  Repeat CXR abnormal-"nodule"--dedicated CT of chest reveals bilateral effusions/atelectasis--c/w CHF  echo reveals significant AVdz--TAVR evaluation--CTS--medical rx  Cardiology management of AF and diuretics-amiodarone--will need out pt PFTs/TFTs, LFTs  Edwin Briones MD-Pulmonary   [phone]

## 2017-11-24 NOTE — DISCHARGE NOTE ADULT - CONDITIONS AT DISCHARGE
Patient PIVL removed. no  evidence of infiltration noted at discharge. Patient skin intact, vital signs stable, Patient with no complaints of SOB, or chest pain at discharge Patient PIVL removed. no  evidence of infiltration noted at discharge. Patient skin intact, vital signs stable, Patient with no complaints of SOB, or chest pain at discharge. Patient PIVL removed. no  evidence of infiltration noted at discharge. Patient skin intact, vital signs stable, Patient with no complaints of SOB, or chest pain at discharge. Pt discharged as per MD orders in stable condition.

## 2017-11-24 NOTE — PROGRESS NOTE ADULT - PROBLEM SELECTOR PLAN 5
- On admission, patient w/ troponin 0.11, likely 2/2 demand ischemia (Type II NSTEMI); ACS less likely given no ischemic changes on EKG  Second troponin 0.12, third troponin 0.16  - Off asa 81 per cardiology recommendations; C/w home atorvastatin

## 2017-11-24 NOTE — PROGRESS NOTE ADULT - SUBJECTIVE AND OBJECTIVE BOX
CHIEF COMPLAINT:no sob or cough or chest pain    Interval Events: CTS--medical rx    REVIEW OF SYSTEMS:  Constitutional: No fevers or chills. No weight loss. No fatigue or generalized malaise.  Eyes: No itching or discharge from the eyes  ENT: No ear pain. No ear discharge. No nasal congestion. No post nasal drip. No epistaxis. No throat pain. No sore throat. No difficulty swallowing.   CV: No chest pain. No palpitations. No lightheadedness or dizziness.   Resp: No dyspnea at rest. No dyspnea on exertion. No orthopnea. No wheezing. No cough. No stridor. No sputum production. No chest pain with respiration.  GI: No nausea. No vomiting. No diarrhea.  MSK: No joint pain or pain in any extremities  Integumentary: No skin lesions. No pedal edema.  Neurological: No gross motor weakness. No sensory changes.  [+ ] All other systems negative  [ ] Unable to assess ROS because ________    OBJECTIVE:  ICU Vital Signs Last 24 Hrs  T(C): 36.8 (24 Nov 2017 05:09), Max: 36.8 (24 Nov 2017 05:09)  T(F): 98.3 (24 Nov 2017 05:09), Max: 98.3 (24 Nov 2017 05:09)  HR: 71 (24 Nov 2017 05:09) (62 - 71)  BP: 144/78 (23 Nov 2017 20:47) (119/76 - 144/78)  BP(mean): --  ABP: --  ABP(mean): --  RR: 18 (24 Nov 2017 05:09) (18 - 18)  SpO2: 97% (24 Nov 2017 05:09) (96% - 97%)        11-22 @ 07:01  -  11-23 @ 07:00  --------------------------------------------------------  IN: 200 mL / OUT: 120 mL / NET: 80 mL    11-23 @ 07:01 - 11-24 @ 05:14  --------------------------------------------------------  IN: 300 mL / OUT: 0 mL / NET: 300 mL      CAPILLARY BLOOD GLUCOSE          PHYSICAL EXAM: NAD in chair on O2  General: Awake, alert, oriented X 2.   HEENT: Atraumatic, normocephalic.                 Mallampatti Grade 2                No nasal congestion.                No tonsillar or pharyngeal exudates.  Lymph Nodes: No palpable lymphadenopathy  Neck: No JVD. No carotid bruit.   Respiratory: reduced chest expansion                         bibasilar dullness to percussion                         Normal and equal air entry                         No wheeze, rhonchi or rales.  Cardiovascular: S1 S2 normal. 2+ murmurs, rubs or gallops.   Abdomen: Soft, non-tender, non-distended. No organomegaly.  Extremities: Warm to touch. Peripheral pulse palpable. No pedal edema.   Skin: No rashes or skin lesions  Neurological: Motor and sensory examination equal and normal in all four extremities.  Psychiatry: Appropriate mood and affect.    HOSPITAL MEDICATIONS:  MEDICATIONS  (STANDING):  amiodarone    Tablet 400 milliGRAM(s) Oral every 8 hours  apixaban 2.5 milliGRAM(s) Oral every 12 hours  atorvastatin 10 milliGRAM(s) Oral at bedtime  buDESOnide   0.5 milliGRAM(s) Respule 0.5 milliGRAM(s) Inhalation two times a day  cholecalciferol 2000 Unit(s) Oral daily  diltiazem    milliGRAM(s) Oral daily  influenza   Vaccine 0.5 milliLiter(s) IntraMuscular once  levalbuterol Inhalation 0.63 milliGRAM(s) Inhalation every 6 hours  levETIRAcetam 250 milliGRAM(s) Oral daily  montelukast 10 milliGRAM(s) Oral daily  pantoprazole    Tablet 40 milliGRAM(s) Oral before breakfast  tiotropium 18 MICROgram(s) Capsule 1 Capsule(s) Inhalation daily    MEDICATIONS  (PRN):  docusate sodium 100 milliGRAM(s) Oral daily PRN Constipation  senna 2 Tablet(s) Oral at bedtime PRN Constipation      LABS:                        12.1   11.5  )-----------( 401      ( 23 Nov 2017 07:03 )             37.9     11-23    144  |  102  |  53<H>  ----------------------------<  108<H>  4.1   |  26  |  1.98<H>    Ca    9.7      23 Nov 2017 07:03  Phos  3.7     11-23  Mg     2.3     11-23                MICROBIOLOGY:     RADIOLOGY:  [ ] Reviewed and interpreted by me    Point of Care Ultrasound Findings:    PFT:    EKG:

## 2017-11-24 NOTE — PROGRESS NOTE ADULT - PROBLEM SELECTOR PLAN 9
- Patient has dementia and is AAOx1 at baseline  - Fall precautions  - Aspiration precautions  - Enhanced supervision

## 2017-11-24 NOTE — DISCHARGE NOTE ADULT - HOSPITAL COURSE
Pt is an 88 yo F w/ hx of afib (not on AC), CVA, advanced dementia (AAOx1 baseline), epilepsy (on keppra), fall, HTN, and pulmonary disease who presents w/ generalized weakness, malaise, and SOB x 1 week. Pt was in her usual state of health until 1 wk PTA when she was noted by her HHA and daughter to have fluctuating energy levels and reduced appetite. Pt also developed a dry cough and began wheezing on the day of admission. Pt also was complaining of back pain and achiness. EMS was called in to patient's home. EMS found pt in Afib w/ RVR (HR 150s), hypotensive w/ SBP in the 100s, and hypoxic w/ SpO2 88% on RA. 6L NC was started with subsequent improvement in SpO2. On exam, pt was reported to have bibasilar rhonchi and faint wheezing. Pt was given combivent tx at home as well as her home med Cardizem 60mg PO. A 250 cc bolus of NS was provided by EMS at home. Per HHA, pt has not been ambulating for the past few days but typically walks unassisted. Also noticed decreased appetite over the past few days. Pt was last hospitalized in 1/2017 for a mechanical fall at home. Of note, pt is DNR/DNI. MOLST in chart.    In the ED, initial VS were /66, , RR 22, T 98.3, SpO2 97%. Labs were notable for WBC 12.1 w/ 82.8% neutrophils, hgb 10.7 (baseline), Cr 1.52, AST 47, trop 0.11, lactate 2.4, and pro-BNP 25644. BCx x 2 were drawn. EKG showed afib w/  and no ischemic changes. CXR demonstrated bilateral patchy opacities c/w pulmonary edema and b/l pleural effusions (R > L) w/ associated atelectasis. Given CTX x 1, azithromycin x 1, and cardizem 60 x 1.     Pt is poor historian but, at time of encounter, denied fever, chills, headache, changes in vision, congestion, sore throat, cough, chest pain, palpitations, shortness of breath, abdominal pain, nausea, diarrhea, constipation, dysuria, hematuria, numbness, tingling, and muscle weakness. No sick contacts or recent travel.    Acute on chronic diastolic heart failure.   - Monitoring off lasix per cardiology. Will diurese if needed.  - Wean supplemental O2 as tolerated.     Aortic valve disease.    - Pt w/ severe AS and severe AR  - TTE from 1/2017 showing hyperdynamic LV, EF 75%, mild-moderate AS, severe AR, no segmental wall motion abnormality  - Repeat TTE obtained per cardiology to evaluate for valvular disease and pt's candidacy for TAVR showing mild MS, moderate-severe AS, severe AR, hyperdynamic LV systolic fx, and mild-moderate pulm regurg; However pt w/ underlying cognitive impairment  - Per CT surgery, given severe AI, TAVR not indicated, as AI is the prominent problem; BAV also contraindicated w/ severe AI.    Pulmonary nodule.   - 1.3 cm RLL pulmonary nodule was found on CXR  - CT chest obtained for further evaluation showed large b/l pleural effusions w/ associated atelectasis; However, no evidence of pulm nodule.     Atrial fibrillation with RVR.    -c/w cardizem 120 qd per cardiology  -on amio load w/ 400 q8h for 12 doses to be followed by maintenance dose of 200 qd per cardiology  -c/w eliquis 2.5 bid    Non-ST elevation myocardial infarction (NSTEMI), type 2.    - on admission, patient w/ troponin 0.11, likely 2/2 demand ischemia (Type II NSTEMI); ACS less likely given no ischemic changes on EKG;   - Off asa 81 per cardiology recommendations; C/w home atorvastatin.     Acute kidney injury.   - patient w/ SCr 1.52 on admission (baseline SCr ~1.1); Likely 2/2 hypoperfusion  - downtrending to 1.9 this AM    COPD (chronic obstructive pulmonary disease).    - C/w montelukast, budesonide, xopenex, and spiriva per pulm  - Pulmonary toilet-incentive spirometry, Chest Pt-acapella/chest vest up to 5x daily per pulm  - Will need PFTs/TFTs, LFTs outpatient per pulm given initiation of Amiodarone.    Epilepsy.  - C/w home keppra. Patient is an 87 year old female with a history of atrial fibrillation, cerebrovascular accident, advanced dementia, epilepsy, falls, hypertension, and chronic obstructive pulmonary disease who presented with generalized weakness, malaise, and shortness of breath of one week duration and dry cough and wheezing for one day. Emergency Medical Services were called in to patient's home and found patient in atrial fibrillation with rapid ventricular response, hypotensive, and hypoxic on room air. Nasal cannula was placed with subsequent improvement in oxygen saturation. On exam, patient was reported to have bibasilar rhonchi and faint wheezing. Patient was given combivent treatment at home as well as her home med Cardizem. A bolus of intravenous fluids was provided by [facility] at home. In the emergency department, patient was hypotensive, tachycardic, and tachypneic. Labs were notable for leukocytosis with neutrophilic predominance and elevated serum creatinine, troponin, lactate, and serum pro-brain natriuretic peptide. Two sets of blood cultures were drawn. Electrocardiogram showed atrial fibrillation with rapid ventricular response but no ischemic changes. Chest x-ray demonstrated bilateral patchy opacities consistent with pulmonary edema and bilateral pleural effusions, right greater than left, with associated atelectasis. Patient was given ceftriaxone, azithromycin, cardizem. Patient was admitted to medicine for further management and placed on nasal cannula. Patient's blood pressure dropped so intravenous fluids were given with subsequent improvement in blood pressure. Cardiology evaluated the patient, determined that she was not a candidate for the coronary care unit, and recommended administering intravenous lasix and increasing patient's home dose of cardizem. Ceftriaxone and azithromycin were started to cover for possible pneumonia. Patient converted to sinus rhythm and her blood pressure improved, but she required Haldol for agitation. Patient's agitation subsided. Diuresis with lasix was started. Pulmonology evaluated the patient and recommended discontinuing antibiotics due to low suspicion for infection. Patient's course was complicated by conversion back from sinus rhythm to atrial fibrillation. Repeat chest x-ray was obtained which showed small bilateral pleural effusions and moderate pulmonary edema, not significantly changed, and a 1.3 cm nodular opacity in the right lower lobe. Blood cultures and urinalysis came back neg. Patient remained in atrial fibrillation with controlled heart rate. Amiodarone was added for rhythm control per cardiology recommendations. Physical therapy recommended discharge to rehab. Patient went back into atrial fibrillation with rapid ventricular response. Patient was started on an amiodarone load with plan to be followed by maintenance dose. Transthoracic echocardiogram showed mild mitral stenosis, moderate to severe aortic stenosis, severe aortic regurgitation, hyperdynamic left ventricular systolic function, and mild to moderate pulmonary regurgitation. Eliquis was added. Repeat chest x-ray, which was obtained per pulmonary recommendations, showed small bilateral pleural effusions with adjacent atelectasis. CT scan of the chest, which was obtained for further evaluation of the nodular opacity noted on prior chest x-ray, showed large bilateral effusions with associated atelectasis. Patient had sustained atrial fibrillation with rapid ventricular response so intravenous lopressor was given with subsequent conversion to sinus rhythm. Cardiothoracic surgery evaluated the patient and noted that, given patient's severe aortic insufficiency, transcatheter aortic valve replacement is not indicated, as aortic insuffiency is the prominent problem. Balloon aortic valvuloplasty also determined to be contraindicated in the setting of severe aortic insuffiency. Patient remained in sinus rhythm and gradually weaned off nasal cannula. Lasix was discontinued per cardiology recommendations. Patient is clinically stable for discharge to subacute rehabilitation and advised to follow up with a primary care provider, cardiologist, and pulmonologist after discharge. Patient is an 87 year old female with a history of atrial fibrillation, cerebrovascular accident, advanced dementia, epilepsy, falls, hypertension, and chronic obstructive pulmonary disease who presented with generalized weakness, malaise, and shortness of breath of one week duration and dry cough and wheezing for one day. Emergency Medical Services were called in to patient's home and found patient in atrial fibrillation with rapid ventricular response, hypotensive, and hypoxic on room air. Nasal cannula was placed with subsequent improvement in oxygen saturation. On exam, patient was reported to have bibasilar rhonchi and faint wheezing. Patient was given combivent treatment at home as well as her home med Cardizem. A bolus of intravenous fluids was provided by [facility] at home. In the emergency department, patient was hypotensive, tachycardic, and tachypneic. Labs were notable for leukocytosis with neutrophilic predominance and elevated serum creatinine, troponin, lactate, and serum pro-brain natriuretic peptide. Two sets of blood cultures were drawn. Electrocardiogram showed atrial fibrillation with rapid ventricular response but no ischemic changes. Chest x-ray demonstrated bilateral patchy opacities consistent with pulmonary edema and bilateral pleural effusions, right greater than left, with associated atelectasis. Patient was given ceftriaxone, azithromycin, cardizem. Patient was admitted to medicine for further management and placed on nasal cannula. Patient's blood pressure dropped so intravenous fluids were given with subsequent improvement in blood pressure. Cardiology evaluated the patient, determined that she was not a candidate for the coronary care unit, and recommended administering intravenous lasix and increasing patient's home dose of cardizem. Ceftriaxone and azithromycin were started to cover for possible pneumonia. Patient converted to sinus rhythm and her blood pressure improved, but she required Haldol for agitation. Patient's agitation subsided. Diuresis with lasix was started. Pulmonology evaluated the patient and recommended discontinuing antibiotics due to low suspicion for infection. Patient's course was complicated by conversion back from sinus rhythm to atrial fibrillation. Repeat chest x-ray was obtained which showed small bilateral pleural effusions and moderate pulmonary edema, not significantly changed, and a 1.3 centimeter nodular opacity in the right lower lobe. Blood cultures and urinalysis came back neg. Patient remained in atrial fibrillation with controlled heart rate. Amiodarone was added for rhythm control per cardiology recommendations. Physical therapy recommended discharge to rehab. Patient went back into atrial fibrillation with rapid ventricular response. Patient was started on an amiodarone load with plan to be followed by maintenance dose. Transthoracic echocardiogram showed mild mitral stenosis, moderate to severe aortic stenosis, severe aortic regurgitation, hyperdynamic left ventricular systolic function, and mild to moderate pulmonary regurgitation. Eliquis was added. Repeat chest x-ray, which was obtained per pulmonary recommendations, showed small bilateral pleural effusions with adjacent atelectasis. CT scan of the chest, which was obtained for further evaluation of the nodular opacity noted on prior chest x-ray, showed large bilateral effusions with associated atelectasis. Patient had sustained atrial fibrillation with rapid ventricular response so intravenous lopressor was given with subsequent conversion to sinus rhythm. Cardiothoracic surgery evaluated the patient and noted that, given patient's severe aortic insufficiency, transcatheter aortic valve replacement is not indicated, as aortic insuffiency is the prominent problem. Balloon aortic valvuloplasty also determined to be contraindicated in the setting of severe aortic insuffiency. Patient remained in sinus rhythm and gradually weaned off nasal cannula. Lasix was discontinued per cardiology recommendations. Patient is clinically stable for discharge to subacute rehabilitation and advised to follow up with a primary care provider, cardiologist, and pulmonologist after discharge.

## 2017-11-24 NOTE — PROGRESS NOTE ADULT - SUBJECTIVE AND OBJECTIVE BOX
Patient is a 87y old  Female who presents with a chief complaint of Shortness of breath, generalized weakness, malaise x 1 week (17 Nov 2017 17:02)      SUBJECTIVE / OVERNIGHT EVENTS:  Patient seen and examined at bedside. No acute events overnight. Was _ on tele. No acute events overnight. Patient denies fever, SOB, CP, and palpitations this AM.       MEDICATIONS  (STANDING):  amiodarone    Tablet 400 milliGRAM(s) Oral every 8 hours  apixaban 2.5 milliGRAM(s) Oral every 12 hours  atorvastatin 10 milliGRAM(s) Oral at bedtime  buDESOnide   0.5 milliGRAM(s) Respule 0.5 milliGRAM(s) Inhalation two times a day  cholecalciferol 2000 Unit(s) Oral daily  diltiazem    milliGRAM(s) Oral daily  influenza   Vaccine 0.5 milliLiter(s) IntraMuscular once  levalbuterol Inhalation 0.63 milliGRAM(s) Inhalation every 6 hours  levETIRAcetam 250 milliGRAM(s) Oral daily  montelukast 10 milliGRAM(s) Oral daily  pantoprazole    Tablet 40 milliGRAM(s) Oral before breakfast  tiotropium 18 MICROgram(s) Capsule 1 Capsule(s) Inhalation daily    MEDICATIONS  (PRN):  docusate sodium 100 milliGRAM(s) Oral daily PRN Constipation  senna 2 Tablet(s) Oral at bedtime PRN Constipation      Vital Signs Last 24 Hrs  T(C): 36.8 (24 Nov 2017 05:09), Max: 36.8 (24 Nov 2017 05:09)  T(F): 98.3 (24 Nov 2017 05:09), Max: 98.3 (24 Nov 2017 05:09)  HR: 71 (24 Nov 2017 05:09) (62 - 71)  BP: 159/66 (24 Nov 2017 05:09) (119/76 - 159/66)  BP(mean): --  RR: 18 (24 Nov 2017 05:09) (18 - 18)  SpO2: 97% (24 Nov 2017 05:09) (96% - 97%)  CAPILLARY BLOOD GLUCOSE        I&O's Summary    22 Nov 2017 07:01  -  23 Nov 2017 07:00  --------------------------------------------------------  IN: 200 mL / OUT: 120 mL / NET: 80 mL    23 Nov 2017 07:01  -  24 Nov 2017 05:56  --------------------------------------------------------  IN: 300 mL / OUT: 0 mL / NET: 300 mL        PHYSICAL EXAM:  GENERAL: NAD, laying in bed  HEAD:  Atraumatic, Normocephalic  EYES: EOMI, PERRLA, conjunctiva and sclera clear  ENMT: MMM, oropharynx clear  NECK: Supple, no LAD   NERVOUS SYSTEM: AAOx1, no focal deficits  CHEST/LUNG: Breath sounds CTA b/l; No rhonchi, wheezes, or rales  HEART: Regular rate and rhythm; +SM  ABDOMEN: Soft, Nontender, Nondistended; Bowel sounds present  EXTREMITIES: 2+ Peripheral Pulses, No clubbing, cyanosis, or edema  LYMPH: No lymphadenopathy noted  SKIN: No rashes or lesions    LABS:                        12.1   11.5  )-----------( 401      ( 23 Nov 2017 07:03 )             37.9     WBC Trend: 11.5<--, 12.5<--, 11.5<--  11-23    144  |  102  |  53<H>  ----------------------------<  108<H>  4.1   |  26  |  1.98<H>    Ca    9.7      23 Nov 2017 07:03  Phos  3.7     11-23  Mg     2.3     11-23      Creatinine Trend: 1.98<--, 2.00<--, 1.64<--, 1.67<--, 1.82<--, 1.89<--    Culture - Blood (11.17.17 @ 15:50)    Specimen Source: .Blood Blood    Culture Results:   No growth at 5 days.    Culture - Blood (11.17.17 @ 15:50)    Specimen Source: .Blood Blood    Culture Results:   No growth at 5 days.    RADIOLOGY & ADDITIONAL TESTS:    Imaging Personally Reviewed:    Consultant(s) Notes Reviewed: Cardiology, Pulmonology     Care Discussed with Consultants/Other Providers:    CONTACT #: [id number] Patient is a 87y old  Female who presents with a chief complaint of Shortness of breath, generalized weakness, malaise x 1 week (17 Nov 2017 17:02)      SUBJECTIVE / OVERNIGHT EVENTS:  Patient seen and examined at bedside. No acute events overnight. Was sinus 60s-80s on tele. No acute events overnight. Patient denies fever, SOB, CP, and palpitations this AM.       MEDICATIONS  (STANDING):  amiodarone    Tablet 400 milliGRAM(s) Oral every 8 hours  apixaban 2.5 milliGRAM(s) Oral every 12 hours  atorvastatin 10 milliGRAM(s) Oral at bedtime  buDESOnide   0.5 milliGRAM(s) Respule 0.5 milliGRAM(s) Inhalation two times a day  cholecalciferol 2000 Unit(s) Oral daily  diltiazem    milliGRAM(s) Oral daily  influenza   Vaccine 0.5 milliLiter(s) IntraMuscular once  levalbuterol Inhalation 0.63 milliGRAM(s) Inhalation every 6 hours  levETIRAcetam 250 milliGRAM(s) Oral daily  montelukast 10 milliGRAM(s) Oral daily  pantoprazole    Tablet 40 milliGRAM(s) Oral before breakfast  tiotropium 18 MICROgram(s) Capsule 1 Capsule(s) Inhalation daily    MEDICATIONS  (PRN):  docusate sodium 100 milliGRAM(s) Oral daily PRN Constipation  senna 2 Tablet(s) Oral at bedtime PRN Constipation      Vital Signs Last 24 Hrs  T(C): 36.8 (24 Nov 2017 05:09), Max: 36.8 (24 Nov 2017 05:09)  T(F): 98.3 (24 Nov 2017 05:09), Max: 98.3 (24 Nov 2017 05:09)  HR: 71 (24 Nov 2017 05:09) (62 - 71)  BP: 159/66 (24 Nov 2017 05:09) (119/76 - 159/66)  BP(mean): --  RR: 18 (24 Nov 2017 05:09) (18 - 18)  SpO2: 97% (24 Nov 2017 05:09) (96% - 97%)  CAPILLARY BLOOD GLUCOSE        I&O's Summary    22 Nov 2017 07:01  -  23 Nov 2017 07:00  --------------------------------------------------------  IN: 200 mL / OUT: 120 mL / NET: 80 mL    23 Nov 2017 07:01  -  24 Nov 2017 05:56  --------------------------------------------------------  IN: 300 mL / OUT: 0 mL / NET: 300 mL        PHYSICAL EXAM:  GENERAL: NAD, laying in bed, NC in place  HEAD:  Atraumatic, Normocephalic  EYES: EOMI, PERRLA, conjunctiva and sclera clear  ENMT: MMM, oropharynx clear  NECK: Supple, no LAD   NERVOUS SYSTEM: AAOx1, no focal deficits  CHEST/LUNG: Breath sounds CTA b/l; No rhonchi, wheezes, or rales  HEART: Regular rate and rhythm; +SM  ABDOMEN: Soft, Nontender, Nondistended; Bowel sounds present  EXTREMITIES: 2+ Peripheral Pulses, No clubbing, cyanosis, or edema  LYMPH: No lymphadenopathy noted  SKIN: No rashes or lesions    LABS:                                   11.2   11.9  )-----------( 377      ( 24 Nov 2017 06:24 )             36.2     WBC Trend: 11.9<--, 11.5<--, 12.5<--, 11.5<--  11-24    142  |  104  |  50<H>  ----------------------------<  104<H>  4.8   |  25  |  1.90<H>    Ca    9.7      24 Nov 2017 06:24  Phos  3.1     11-24  Mg     2.3     11-24    Creatinine Trend: 1.90<--, 1.98<--, 2.00<--, 1.64<--, 1.67<--, 1.82<--, 1.89<--    Culture - Blood (11.17.17 @ 15:50)    Specimen Source: .Blood Blood    Culture Results:   No growth at 5 days.    Culture - Blood (11.17.17 @ 15:50)    Specimen Source: .Blood Blood    Culture Results:   No growth at 5 days.    RADIOLOGY & ADDITIONAL TESTS:    Imaging Personally Reviewed:    Consultant(s) Notes Reviewed: Cardiology, Pulmonology     Care Discussed with Consultants/Other Providers:    CONTACT #: [id number] Patient is a 87y old  Female who presents with a chief complaint of Shortness of breath, generalized weakness, malaise x 1 week (17 Nov 2017 17:02)    SUBJECTIVE / OVERNIGHT EVENTS:  Patient seen and examined at bedside. No acute events overnight. Was sinus 60s-80s on tele. No acute events overnight. Patient denies fever, SOB, CP, and palpitations this AM.     MEDICATIONS  (STANDING):  amiodarone    Tablet 400 milliGRAM(s) Oral every 8 hours  apixaban 2.5 milliGRAM(s) Oral every 12 hours  atorvastatin 10 milliGRAM(s) Oral at bedtime  buDESOnide   0.5 milliGRAM(s) Respule 0.5 milliGRAM(s) Inhalation two times a day  cholecalciferol 2000 Unit(s) Oral daily  diltiazem    milliGRAM(s) Oral daily  influenza   Vaccine 0.5 milliLiter(s) IntraMuscular once  levalbuterol Inhalation 0.63 milliGRAM(s) Inhalation every 6 hours  levETIRAcetam 250 milliGRAM(s) Oral daily  montelukast 10 milliGRAM(s) Oral daily  pantoprazole    Tablet 40 milliGRAM(s) Oral before breakfast  tiotropium 18 MICROgram(s) Capsule 1 Capsule(s) Inhalation daily    MEDICATIONS  (PRN):  docusate sodium 100 milliGRAM(s) Oral daily PRN Constipation  senna 2 Tablet(s) Oral at bedtime PRN Constipation    Vital Signs Last 24 Hrs  T(C): 36.8 (24 Nov 2017 05:09), Max: 36.8 (24 Nov 2017 05:09)  T(F): 98.3 (24 Nov 2017 05:09), Max: 98.3 (24 Nov 2017 05:09)  HR: 71 (24 Nov 2017 05:09) (62 - 71)  BP: 159/66 (24 Nov 2017 05:09) (119/76 - 159/66)  RR: 18 (24 Nov 2017 05:09) (18 - 18)  SpO2: 97% (24 Nov 2017 05:09) (96% - 97%)    I&O's Summary  22 Nov 2017 07:01  -  23 Nov 2017 07:00  --------------------------------------------------------  IN: 200 mL / OUT: 120 mL / NET: 80 mL    23 Nov 2017 07:01  -  24 Nov 2017 05:56  --------------------------------------------------------  IN: 300 mL / OUT: 0 mL / NET: 300 mL    PHYSICAL EXAM:  GENERAL: NAD, laying in bed, NC in place  HEAD:  Atraumatic, Normocephalic  EYES: EOMI, PERRLA, conjunctiva and sclera clear  ENMT: MMM, oropharynx clear  NECK: Supple, no LAD   NERVOUS SYSTEM: AAOx1, no focal deficits  CHEST/LUNG: Breath sounds CTA b/l; No rhonchi, wheezes, or rales  HEART: Regular rate and rhythm; +SM  ABDOMEN: Soft, Nontender, Nondistended; Bowel sounds present  EXTREMITIES: 2+ Peripheral Pulses, No clubbing, cyanosis, or edema  LYMPH: No lymphadenopathy noted  SKIN: No rashes or lesions    LABS:                     11.2   11.9  )-----------( 377      ( 24 Nov 2017 06:24 )             36.2     WBC Trend: 11.9<--, 11.5<--, 12.5<--, 11.5<--  11-24    142  |  104  |  50<H>  ----------------------------<  104<H>  4.8   |  25  |  1.90<H>    Ca    9.7      24 Nov 2017 06:24  Phos  3.1     11-24  Mg     2.3     11-24    Creatinine Trend: 1.90<--, 1.98<--, 2.00<--, 1.64<--, 1.67<--, 1.82<--, 1.89<--    Culture - Blood (11.17.17 @ 15:50)    Specimen Source: .Blood Blood    Culture Results:   No growth at 5 days.    Culture - Blood (11.17.17 @ 15:50)    Specimen Source: .Blood Blood    Culture Results:   No growth at 5 days.    RADIOLOGY & ADDITIONAL TESTS:    Consultant(s) Notes Reviewed: Cardiology, Pulmonology     CONTACT #: [id number]

## 2017-11-24 NOTE — PROGRESS NOTE ADULT - PROBLEM SELECTOR PLAN 4
- Patient w/ hx of afib (not on AC), found to be in afib w/ RVR w/ HR up to 150s on assessment by EMS  - During this admission, patient has been in and out of sinus and is presently in sinus  -c/w cardizem 120 qd per cardiology  -on amio load w/ 400 q8h for 12 doses to be followed by maintenance dose of 200 qd per cardiology  -c/w eliquis 2.5 bid  -tele monitoring

## 2017-11-25 LAB
ANION GAP SERPL CALC-SCNC: 12 MMOL/L — SIGNIFICANT CHANGE UP (ref 5–17)
BUN SERPL-MCNC: 45 MG/DL — HIGH (ref 7–23)
CALCIUM SERPL-MCNC: 9.4 MG/DL — SIGNIFICANT CHANGE UP (ref 8.4–10.5)
CHLORIDE SERPL-SCNC: 105 MMOL/L — SIGNIFICANT CHANGE UP (ref 96–108)
CO2 SERPL-SCNC: 26 MMOL/L — SIGNIFICANT CHANGE UP (ref 22–31)
CREAT SERPL-MCNC: 1.88 MG/DL — HIGH (ref 0.5–1.3)
GLUCOSE SERPL-MCNC: 100 MG/DL — HIGH (ref 70–99)
HCT VFR BLD CALC: 34.9 % — SIGNIFICANT CHANGE UP (ref 34.5–45)
HGB BLD-MCNC: 11 G/DL — LOW (ref 11.5–15.5)
MAGNESIUM SERPL-MCNC: 2.2 MG/DL — SIGNIFICANT CHANGE UP (ref 1.6–2.6)
MCHC RBC-ENTMCNC: 27.6 PG — SIGNIFICANT CHANGE UP (ref 27–34)
MCHC RBC-ENTMCNC: 31.4 GM/DL — LOW (ref 32–36)
MCV RBC AUTO: 87.8 FL — SIGNIFICANT CHANGE UP (ref 80–100)
PHOSPHATE SERPL-MCNC: 3.3 MG/DL — SIGNIFICANT CHANGE UP (ref 2.5–4.5)
PLATELET # BLD AUTO: 376 K/UL — SIGNIFICANT CHANGE UP (ref 150–400)
POTASSIUM SERPL-MCNC: 5 MMOL/L — SIGNIFICANT CHANGE UP (ref 3.5–5.3)
POTASSIUM SERPL-SCNC: 5 MMOL/L — SIGNIFICANT CHANGE UP (ref 3.5–5.3)
RBC # BLD: 3.97 M/UL — SIGNIFICANT CHANGE UP (ref 3.8–5.2)
RBC # FLD: 14.7 % — HIGH (ref 10.3–14.5)
SODIUM SERPL-SCNC: 143 MMOL/L — SIGNIFICANT CHANGE UP (ref 135–145)
WBC # BLD: 13.1 K/UL — HIGH (ref 3.8–10.5)
WBC # FLD AUTO: 13.1 K/UL — HIGH (ref 3.8–10.5)

## 2017-11-25 PROCEDURE — 93010 ELECTROCARDIOGRAM REPORT: CPT

## 2017-11-25 PROCEDURE — 99233 SBSQ HOSP IP/OBS HIGH 50: CPT

## 2017-11-25 RX ORDER — AMIODARONE HYDROCHLORIDE 400 MG/1
200 TABLET ORAL DAILY
Qty: 0 | Refills: 0 | Status: DISCONTINUED | OUTPATIENT
Start: 2017-11-25 | End: 2017-11-27

## 2017-11-25 RX ORDER — AMIODARONE HYDROCHLORIDE 400 MG/1
1 TABLET ORAL
Qty: 0 | Refills: 0 | DISCHARGE
Start: 2017-11-25

## 2017-11-25 RX ORDER — AMIODARONE HYDROCHLORIDE 400 MG/1
1 TABLET ORAL
Qty: 0 | Refills: 0 | COMMUNITY
Start: 2017-11-25

## 2017-11-25 RX ADMIN — APIXABAN 2.5 MILLIGRAM(S): 2.5 TABLET, FILM COATED ORAL at 05:41

## 2017-11-25 RX ADMIN — LEVALBUTEROL 0.63 MILLIGRAM(S): 1.25 SOLUTION, CONCENTRATE RESPIRATORY (INHALATION) at 05:40

## 2017-11-25 RX ADMIN — LEVETIRACETAM 250 MILLIGRAM(S): 250 TABLET, FILM COATED ORAL at 11:45

## 2017-11-25 RX ADMIN — ATORVASTATIN CALCIUM 10 MILLIGRAM(S): 80 TABLET, FILM COATED ORAL at 21:11

## 2017-11-25 RX ADMIN — Medication 0.5 MILLIGRAM(S): at 17:31

## 2017-11-25 RX ADMIN — Medication 120 MILLIGRAM(S): at 05:41

## 2017-11-25 RX ADMIN — PANTOPRAZOLE SODIUM 40 MILLIGRAM(S): 20 TABLET, DELAYED RELEASE ORAL at 05:40

## 2017-11-25 RX ADMIN — APIXABAN 2.5 MILLIGRAM(S): 2.5 TABLET, FILM COATED ORAL at 17:31

## 2017-11-25 RX ADMIN — TIOTROPIUM BROMIDE 1 CAPSULE(S): 18 CAPSULE ORAL; RESPIRATORY (INHALATION) at 11:44

## 2017-11-25 RX ADMIN — Medication 2000 UNIT(S): at 11:44

## 2017-11-25 RX ADMIN — Medication 0.5 MILLIGRAM(S): at 05:40

## 2017-11-25 RX ADMIN — Medication 1 TABLET(S): at 11:46

## 2017-11-25 RX ADMIN — LEVALBUTEROL 0.63 MILLIGRAM(S): 1.25 SOLUTION, CONCENTRATE RESPIRATORY (INHALATION) at 23:44

## 2017-11-25 RX ADMIN — AMIODARONE HYDROCHLORIDE 200 MILLIGRAM(S): 400 TABLET ORAL at 13:27

## 2017-11-25 RX ADMIN — LEVALBUTEROL 0.63 MILLIGRAM(S): 1.25 SOLUTION, CONCENTRATE RESPIRATORY (INHALATION) at 11:44

## 2017-11-25 RX ADMIN — MONTELUKAST 10 MILLIGRAM(S): 4 TABLET, CHEWABLE ORAL at 11:46

## 2017-11-25 RX ADMIN — LEVALBUTEROL 0.63 MILLIGRAM(S): 1.25 SOLUTION, CONCENTRATE RESPIRATORY (INHALATION) at 17:31

## 2017-11-25 NOTE — PROGRESS NOTE ADULT - SUBJECTIVE AND OBJECTIVE BOX
Patient is a 87y old  Female who presents with a chief complaint of Shortness of breath, generalized weakness, malaise for 1 week (24 Nov 2017 13:01)      SUBJECTIVE / OVERNIGHT EVENTS: No events overnight. No complaints.     MEDICATIONS  (STANDING):  amiodarone    Tablet 200 milliGRAM(s) Oral daily  apixaban 2.5 milliGRAM(s) Oral every 12 hours  atorvastatin 10 milliGRAM(s) Oral at bedtime  buDESOnide   0.5 milliGRAM(s) Respule 0.5 milliGRAM(s) Inhalation two times a day  cholecalciferol 2000 Unit(s) Oral daily  diltiazem    milliGRAM(s) Oral daily  influenza   Vaccine 0.5 milliLiter(s) IntraMuscular once  levalbuterol Inhalation 0.63 milliGRAM(s) Inhalation every 6 hours  levETIRAcetam 250 milliGRAM(s) Oral daily  montelukast 10 milliGRAM(s) Oral daily  multivitamin 1 Tablet(s) Oral daily  pantoprazole    Tablet 40 milliGRAM(s) Oral before breakfast  tiotropium 18 MICROgram(s) Capsule 1 Capsule(s) Inhalation daily    MEDICATIONS  (PRN):  docusate sodium 100 milliGRAM(s) Oral daily PRN Constipation  senna 2 Tablet(s) Oral at bedtime PRN Constipation      Vital Signs Last 24 Hrs  T(C): 36.4 (25 Nov 2017 05:34), Max: 36.7 (24 Nov 2017 12:26)  T(F): 97.5 (25 Nov 2017 05:34), Max: 98.1 (24 Nov 2017 12:26)  HR: 62 (25 Nov 2017 05:34) (56 - 62)  BP: 141/59 (25 Nov 2017 05:34) (120/52 - 141/59)  BP(mean): --  RR: 18 (25 Nov 2017 05:34) (18 - 18)  SpO2: 91% (25 Nov 2017 05:34) (91% - 95%)  CAPILLARY BLOOD GLUCOSE        I&O's Summary    24 Nov 2017 07:01  -  25 Nov 2017 07:00  --------------------------------------------------------  IN: 220 mL / OUT: 0 mL / NET: 220 mL        PHYSICAL EXAM:  GENERAL: NAD, laying in bed, NC in place  HEAD:  Atraumatic, Normocephalic  EYES: EOMI, PERRLA, conjunctiva and sclera clear  ENMT: MMM, oropharynx clear  NECK: Supple, no LAD   NERVOUS SYSTEM: AAOx1, no focal deficits  CHEST/LUNG: Breath sounds CTA b/l; No rhonchi, wheezes, or rales  HEART: Regular rate and rhythm; +SM  ABDOMEN: Soft, Nontender, Nondistended; Bowel sounds present  EXTREMITIES: 2+ Peripheral Pulses, No clubbing, cyanosis, or edema  LYMPH: No lymphadenopathy noted  SKIN: No rashes or lesions      LABS:                        11.0   13.1  )-----------( 376      ( 25 Nov 2017 06:48 )             34.9     11-25    143  |  105  |  45<H>  ----------------------------<  100<H>  5.0   |  26  |  1.88<H>    Ca    9.4      25 Nov 2017 06:48  Phos  3.3     11-25  Mg     2.2     11-25                RADIOLOGY & ADDITIONAL TESTS:    Consultant(s) Notes Reviewed: Cardiology, Pulmonology

## 2017-11-25 NOTE — PROGRESS NOTE ADULT - PROBLEM SELECTOR PLAN 10
DVT PPX: Eliquis 2.5 bid  GI PPX: PPI pre-breakfast, dysphagia 3 diet (DASH/TLC) w/ no concentrated phosphorus    Dispo: PT recommending d/c to [facility], pt's daughter on board -- CM aware - pending placement likely early next week.

## 2017-11-25 NOTE — PROVIDER CONTACT NOTE (OTHER) - NAME OF MD/NP/PA/DO NOTIFIED:
Dr Ceja
Dr. Colleen Odom
Dr. Damico
JORDEN. Alissa Colin
Marcial Toledo MD
Marcial Toledo MD
Rafi Berg MD
Tre Del Valle
Michelle Tariq NP

## 2017-11-25 NOTE — PROVIDER CONTACT NOTE (OTHER) - DATE AND TIME:
18-Nov-2017 02:33
18-Nov-2017 03:55
18-Nov-2017 06:54
19-Nov-2017 04:05
19-Nov-2017 06:50
21-Nov-2017 17:30
24-Nov-2017 21:19
25-Nov-2017
25-Nov-2017 20:00

## 2017-11-25 NOTE — PROVIDER CONTACT NOTE (OTHER) - BACKGROUND
Pt admitted with rapid afib. Pt taking Cardizem 60 mg Q 6 hours.
Patient admitted with A Fibw/RVR, SOB. PNA.
Pt admitted with PNA, pt has a PMH of dementia, pt on 6LNC with rapid desaturation without any supplemental o2.
Pt admitted with Pneumonia, AFib, Dementia, CKD
Pt has been SR/SB in the 50's.
admitted with afib RVR, SOB
pt admitted with PNA with Rapid afib. Pt has a PMH of Dementia and afib, pt currently restrained d/t agitation and noncompliance of supplemental oxygen.
pt admitted with PNA, and acute HF, with Afib in RVR s/p 5 lopressor IVP.
Pt admitted with pneumonia.

## 2017-11-25 NOTE — PROVIDER CONTACT NOTE (OTHER) - ACTION/TREATMENT ORDERED:
NP aware. Covering manager Yadi aware. Pt education provided. Will continue to turned and position every 2 hours overnight. Will monitor.
Continue Cardizem and monitoring on tele.
Con't to monitor. Con't present tx.
EKG Done, Amiodarone 200mg given. Will continue to monitor closely.
Given.  Pt converted to SR HR controlled.
MD aware, MD ordered RN to reschedule medication by 1 hour to have day team evaluate, RN to reschedule, will continue to monitor.
MD ordered .5mg IVP Haldol, pt still refused 02, another 1mg IVP Haldol ordered and administered, pt still refusing oxygen, Wrist retrains applied to assists with supplemental oxygen adherance.
MD rutherford will continue to monitor.
no new orders made, will continue to monitor

## 2017-11-25 NOTE — PROVIDER CONTACT NOTE (OTHER) - SITUATION
Converted back from sinus  to AF.
Pt AFib  's
Pt became agitated and started to refuse oxygen and SPO2 readings.
Pt lowest HR 47.
Pt refusing 60 PO cardizem.
Pt's HR on the monitoring became Tachy with a conversion from sinus to afib.
conversion pause of 4.29 seconds to sinus rhythm 60s
Pressure ulcer

## 2017-11-25 NOTE — PROGRESS NOTE ADULT - PROBLEM SELECTOR PLAN 1
- Volume status improved; Monitoring off lasix per cardiology  - Strict I/O's for goal net negative 500cc to 1L daily per cards recs; Will diurese if needed to meet goal with close monitoring  - Monitor BMP, keep K above 4 and Mg above 2 per pulm  - Daily weights  - Wean supplemental O2 as tolerated

## 2017-11-25 NOTE — PROGRESS NOTE ADULT - PROBLEM SELECTOR PLAN 4
- Patient w/ hx of afib (not on AC), found to be in afib w/ RVR w/ HR up to 150s on assessment by EMS  - During this admission, patient has been in and out of sinus and is presently in sinus  -c/w cardizem 120 qd per cardiology  -S/p amiodarone loading, currently on 200 qd per cardiology  -c/w eliquis 2.5 bid for anticoagulation  -telemetry monitoring

## 2017-11-25 NOTE — PROGRESS NOTE ADULT - PROBLEM SELECTOR PLAN 6
- Patient w/ SCr 1.52 on admission (baseline SCr ~1.1); Likely 2/2 hypoperfusion  S/p 750 cc NS; Will not aggressively hydrate w/ IVF for now due to concern for acute HF exacerbation  - Trend SCr, downtrending to 1.9 this AM  - Renally dose medications

## 2017-11-25 NOTE — PROVIDER CONTACT NOTE (OTHER) - RECOMMENDATIONS
Assess patient and wound.
Assess pt review medication orders.
EKG?
Metoprolol 5mg IVP ordered
STAT EKG ORDERED. Amiodarone daily activated.
monitor

## 2017-11-25 NOTE — CHART NOTE - NSCHARTNOTEFT_GEN_A_CORE
S:  Called by Telemetry Technician for patient who converted to atrial fibrillation from sinus rhythm.  Patient asymptomatic.    O:  Vitals:  BP: 139/65, HR 90's, RR 18, pulse ox 94% on supplemental O2, temperature 97.4.       General:  A&O x 1       CV:  +S1, S2 - murmur       Pulm:  Clear bilaterally       GI:  Abdomen soft, NT, +BS    A/P:  87F with chronic atrial fibrillation (not on AC), h/o CVA, advanced dementia (AAOx1 baseline), epilepsy (on keppra), fall, HTN, and pulmonary disease who presents w/ generalized weakness, malaise, and SOB x 1 week, on admission found had A.fib with RVR and w/ signs of overload on physical exam and CXR concerning for acute diastolic CHF exacerbation.  Patient is s/p IV diuretics and completed amiodarone load last night - converted to NSR on 11/21.  Patient now back in afib with rates in the 90's to low 100's.  1.  Atrial fibrillation      - EKG:  Afib confirmed - Dr. Atkins notified      - Vitals stable      - Amiodarone dose to be given now (already ordered)      - If still in afib this evening, will give an additional 200mG of amiodarone      - Continue diltiazem and AC      - Continue telemetry      - d/w Dr. Rodriguez

## 2017-11-25 NOTE — PROVIDER CONTACT NOTE (OTHER) - ASSESSMENT
Pt A&Ox1-2. On assessment, pt noted to have stage II pressure ulcer to coccyx measuring 0.6x0.4. No previous documentation noted. Site cleansed with normal saline, liquid barrier and Allevyn applied. Wound verified with two RNs. Pt has tortoise cushion at bedside to aide in turning and repositioning. Wound care consult ordered. Wound care RN to assess wound and verify pressure ulcer.
Pt asymptomatic.
Denies any complaints.  Pt remains on tele. Denies chest discomforts or SOB. Patient alert but confused. See flow sheet for vital signes.
PT A+O x1, refusing vitals.
Pt A+O x1, refusing medication.
Pt A+O x2, VSS.
Pt asymptomatic. VSS.
Pt in bed asymptomatic, /85 
VSS.

## 2017-11-25 NOTE — PROGRESS NOTE ADULT - SUBJECTIVE AND OBJECTIVE BOX
ALETALIBAN    Patient is a 87y old  Female who presents with a chief complaint of Shortness of breath, generalized weakness,CHF,PAF,doing well.No CP or SOB,improved.      Allergies    No Known Allergies    Intolerances      MEDICATIONS  (STANDING):  amiodarone    Tablet 200 milliGRAM(s) Oral daily  apixaban 2.5 milliGRAM(s) Oral every 12 hours  atorvastatin 10 milliGRAM(s) Oral at bedtime  buDESOnide   0.5 milliGRAM(s) Respule 0.5 milliGRAM(s) Inhalation two times a day  cholecalciferol 2000 Unit(s) Oral daily  diltiazem    milliGRAM(s) Oral daily  influenza   Vaccine 0.5 milliLiter(s) IntraMuscular once  levalbuterol Inhalation 0.63 milliGRAM(s) Inhalation every 6 hours  levETIRAcetam 250 milliGRAM(s) Oral daily  montelukast 10 milliGRAM(s) Oral daily  multivitamin 1 Tablet(s) Oral daily  pantoprazole    Tablet 40 milliGRAM(s) Oral before breakfast  tiotropium 18 MICROgram(s) Capsule 1 Capsule(s) Inhalation daily    MEDICATIONS  (PRN):  docusate sodium 100 milliGRAM(s) Oral daily PRN Constipation  senna 2 Tablet(s) Oral at bedtime PRN Constipation      ROS:  Positive:    General: Denies weight loss, fevers, rash, decreased hearing  Cardiac: Denies chest pain, SOB, GILBERT, orthopnea, PND, claudication, edema, snoring, daytime somnolence, palpitations, syncope  Resp: Denies SOB, GILBERT, cough, sputum, wheezing, hemoptysis  GI: Denies change in bowel habits, diarrhea, weight loss, melena, tarry stools,   nausea, vomiting, jaundice, abdominal pain, dysphagia  : Denies dysuria, nocturia, hematuria  Neuro: Denies tinnitus, headache, visual changes, weakness, dizziness or vertigo  Musculoskeletal: Denies neck pain back pain joint pain.  Skin: Denies rash, itching, dryness.  Endocrine: Denies polydipsia, polyuria  Psychiatric: Denies depression, anxiety      PHYSICAL EXAM:  Vital Signs Last 24 Hrs  T(C): 36.4 (2017 05:34), Max: 36.7 (2017 12:26)  T(F): 97.5 (2017 05:34), Max: 98.1 (2017 12:26)  HR: 62 (2017 05:34) (56 - 62)  BP: 141/59 (2017 05:34) (120/52 - 141/59)  BP(mean): --  RR: 18 (2017 05:34) (18 - 18)  SpO2: 91% (2017 05:34) (91% - 95%)  Daily     Daily Weight in k.1 (2017 13:01)  I&O's Summary    2017 07:01  -  2017 07:00  --------------------------------------------------------  IN: 300 mL / OUT: 0 mL / NET: 300 mL    2017 07:01  -  2017 05:54  --------------------------------------------------------  IN: 220 mL / OUT: 0 mL / NET: 220 mL        General Appearance: 	 Alert, cooperative, no distress  HEENT: normocephalic, atraumatic, PERRLA, EOMI, conjunctiva normal, sclera anicteric,   Neck: no JVD,  carotid 2+  bilaterally without bruits, thyroid normal to inspection and palpation, no adenopathy, trachea midline  Lungs:  clear to auscultation and percussion bilaterally,no rales  Cor:  pmi 5th ICS MCL, regular rate and rhythm, S1 normal intensity, S2 normal intensity, 2/6 apical systolic murmur  Abdomen:	 soft, non-tender; bowel sounds normal; no masses,  no organomegaly  Extremities: without cyanosis, clubbing or edema  Vasc: 2-+ PT and DP pulses; no varicosities  Neurologic: A&O x 3 (time, place, person). Symmetric strength; limited exam  Musculoskeletal: no kyphosis, scoliosis; normal gait, normal tone  Skin: no rashes; limited exam    EKG:  Telemetry:    Labs:  CBC Full  -  ( 2017 06:24 )  WBC Count : 11.9 K/uL  Hemoglobin : 11.2 g/dL  Hematocrit : 36.2 %  Platelet Count - Automated : 377 K/uL  Mean Cell Volume : 87.6 fl  Mean Cell Hemoglobin : 27.1 pg  Mean Cell Hemoglobin Concentration : 30.9 gm/dL  Auto Neutrophil # : x  Auto Lymphocyte # : x  Auto Monocyte # : x  Auto Eosinophil # : x  Auto Basophil # : x  Auto Neutrophil % : x  Auto Lymphocyte % : x  Auto Monocyte % : x  Auto Eosinophil % : x  Auto Basophil % : x      Impression/Plan:Patient with acute CHF,PAF,HTN,dementia, doing well.Stable in NSR.Cardiopulmonary stable.Continue Dilt.,amiodarone,eliquis.OOB/PT.        Esdras Rodriguez MD, FACC  [facility]

## 2017-11-25 NOTE — PROVIDER CONTACT NOTE (OTHER) - REASON
Agitation
Converted back from sinus  to AF
Earnest
Pt AFib  's
Pt heartrate sustaining in the 110's
Pt refusing PO cardizem
conversion pause of 4.29 seconds at 01:17 to sinus rhythm 60s
elevated HR on the monitor.
Pressure ulcer

## 2017-11-26 DIAGNOSIS — D72.829 ELEVATED WHITE BLOOD CELL COUNT, UNSPECIFIED: ICD-10-CM

## 2017-11-26 LAB
ANION GAP SERPL CALC-SCNC: 17 MMOL/L — SIGNIFICANT CHANGE UP (ref 5–17)
APPEARANCE UR: CLEAR — SIGNIFICANT CHANGE UP
BILIRUB UR-MCNC: NEGATIVE — SIGNIFICANT CHANGE UP
BUN SERPL-MCNC: 40 MG/DL — HIGH (ref 7–23)
CALCIUM SERPL-MCNC: 9.4 MG/DL — SIGNIFICANT CHANGE UP (ref 8.4–10.5)
CHLORIDE SERPL-SCNC: 102 MMOL/L — SIGNIFICANT CHANGE UP (ref 96–108)
CO2 SERPL-SCNC: 23 MMOL/L — SIGNIFICANT CHANGE UP (ref 22–31)
COLOR SPEC: YELLOW — SIGNIFICANT CHANGE UP
CREAT SERPL-MCNC: 1.82 MG/DL — HIGH (ref 0.5–1.3)
DIFF PNL FLD: NEGATIVE — SIGNIFICANT CHANGE UP
GLUCOSE SERPL-MCNC: 161 MG/DL — HIGH (ref 70–99)
GLUCOSE UR QL: NEGATIVE — SIGNIFICANT CHANGE UP
HCT VFR BLD CALC: 37.5 % — SIGNIFICANT CHANGE UP (ref 34.5–45)
HGB BLD-MCNC: 11.7 G/DL — SIGNIFICANT CHANGE UP (ref 11.5–15.5)
KETONES UR-MCNC: NEGATIVE — SIGNIFICANT CHANGE UP
LEUKOCYTE ESTERASE UR-ACNC: NEGATIVE — SIGNIFICANT CHANGE UP
MCHC RBC-ENTMCNC: 27.4 PG — SIGNIFICANT CHANGE UP (ref 27–34)
MCHC RBC-ENTMCNC: 31.2 GM/DL — LOW (ref 32–36)
MCV RBC AUTO: 87.8 FL — SIGNIFICANT CHANGE UP (ref 80–100)
NITRITE UR-MCNC: NEGATIVE — SIGNIFICANT CHANGE UP
PH UR: 6.5 — SIGNIFICANT CHANGE UP (ref 5–8)
PLATELET # BLD AUTO: 405 K/UL — HIGH (ref 150–400)
POTASSIUM SERPL-MCNC: 5 MMOL/L — SIGNIFICANT CHANGE UP (ref 3.5–5.3)
POTASSIUM SERPL-SCNC: 5 MMOL/L — SIGNIFICANT CHANGE UP (ref 3.5–5.3)
PROT UR-MCNC: SIGNIFICANT CHANGE UP
RBC # BLD: 4.27 M/UL — SIGNIFICANT CHANGE UP (ref 3.8–5.2)
RBC # FLD: 14.8 % — HIGH (ref 10.3–14.5)
SODIUM SERPL-SCNC: 142 MMOL/L — SIGNIFICANT CHANGE UP (ref 135–145)
SP GR SPEC: 1.01 — SIGNIFICANT CHANGE UP (ref 1.01–1.02)
UROBILINOGEN FLD QL: NEGATIVE — SIGNIFICANT CHANGE UP
WBC # BLD: 17 K/UL — HIGH (ref 3.8–10.5)
WBC # FLD AUTO: 17 K/UL — HIGH (ref 3.8–10.5)

## 2017-11-26 PROCEDURE — 71010: CPT | Mod: 26

## 2017-11-26 PROCEDURE — 99233 SBSQ HOSP IP/OBS HIGH 50: CPT

## 2017-11-26 RX ADMIN — MONTELUKAST 10 MILLIGRAM(S): 4 TABLET, CHEWABLE ORAL at 08:26

## 2017-11-26 RX ADMIN — Medication 0.5 MILLIGRAM(S): at 05:06

## 2017-11-26 RX ADMIN — LEVALBUTEROL 0.63 MILLIGRAM(S): 1.25 SOLUTION, CONCENTRATE RESPIRATORY (INHALATION) at 05:07

## 2017-11-26 RX ADMIN — APIXABAN 2.5 MILLIGRAM(S): 2.5 TABLET, FILM COATED ORAL at 18:17

## 2017-11-26 RX ADMIN — PANTOPRAZOLE SODIUM 40 MILLIGRAM(S): 20 TABLET, DELAYED RELEASE ORAL at 05:06

## 2017-11-26 RX ADMIN — LEVALBUTEROL 0.63 MILLIGRAM(S): 1.25 SOLUTION, CONCENTRATE RESPIRATORY (INHALATION) at 18:17

## 2017-11-26 RX ADMIN — APIXABAN 2.5 MILLIGRAM(S): 2.5 TABLET, FILM COATED ORAL at 05:06

## 2017-11-26 RX ADMIN — Medication 0.5 MILLIGRAM(S): at 18:17

## 2017-11-26 RX ADMIN — Medication 2000 UNIT(S): at 08:26

## 2017-11-26 RX ADMIN — LEVALBUTEROL 0.63 MILLIGRAM(S): 1.25 SOLUTION, CONCENTRATE RESPIRATORY (INHALATION) at 23:01

## 2017-11-26 RX ADMIN — LEVALBUTEROL 0.63 MILLIGRAM(S): 1.25 SOLUTION, CONCENTRATE RESPIRATORY (INHALATION) at 13:14

## 2017-11-26 RX ADMIN — TIOTROPIUM BROMIDE 1 CAPSULE(S): 18 CAPSULE ORAL; RESPIRATORY (INHALATION) at 08:24

## 2017-11-26 RX ADMIN — Medication 1 TABLET(S): at 08:26

## 2017-11-26 RX ADMIN — LEVETIRACETAM 250 MILLIGRAM(S): 250 TABLET, FILM COATED ORAL at 08:27

## 2017-11-26 RX ADMIN — Medication 100 MILLIGRAM(S): at 21:09

## 2017-11-26 RX ADMIN — ATORVASTATIN CALCIUM 10 MILLIGRAM(S): 80 TABLET, FILM COATED ORAL at 21:09

## 2017-11-26 RX ADMIN — AMIODARONE HYDROCHLORIDE 200 MILLIGRAM(S): 400 TABLET ORAL at 05:06

## 2017-11-26 RX ADMIN — Medication 120 MILLIGRAM(S): at 05:06

## 2017-11-26 NOTE — PROGRESS NOTE ADULT - ATTENDING COMMENTS
Dispo: PT recommending d/c to [facility], pt's daughter on board -- CM aware - pending placement likely early next week.

## 2017-11-26 NOTE — PROGRESS NOTE ADULT - PROBLEM SELECTOR PLAN 1
The patient's WBC count is increasing over the last 2 days. Differential shows neutrophil predominance. She's been afebrile and has no obvious s/s of infection.  Will check a UA, urine culture, and CXR today (r/o infection).  Monitor for fever and change in mental status.

## 2017-11-26 NOTE — PROGRESS NOTE ADULT - PROBLEM SELECTOR PLAN 6
- On admission, patient w/ troponin 0.11, likely 2/2 demand ischemia (Type II NSTEMI); ACS less likely given no ischemic changes on EKG  Second troponin 0.12, third troponin 0.16  - Off ASA 81mg per cardiology recommendations; C/w home atorvastatin

## 2017-11-26 NOTE — PROGRESS NOTE ADULT - SUBJECTIVE AND OBJECTIVE BOX
Patient is a 87y old  Female who presents with a chief complaint of Shortness of breath, generalized weakness, malaise for 1 week (24 Nov 2017 13:01)      SUBJECTIVE / OVERNIGHT EVENTS: The patient developed atrial fibrillation again yesterday afternoon, but it broke spontaneously after a couple of hours. The patient is feeling well this morning; has no complaints. Her mental status is at baseline. Per the nurse, the patient appeared a little SOB today - pulse ox on room air at rest was 91%.     MEDICATIONS  (STANDING):  amiodarone    Tablet 200 milliGRAM(s) Oral daily  apixaban 2.5 milliGRAM(s) Oral every 12 hours  atorvastatin 10 milliGRAM(s) Oral at bedtime  buDESOnide   0.5 milliGRAM(s) Respule 0.5 milliGRAM(s) Inhalation two times a day  cholecalciferol 2000 Unit(s) Oral daily  diltiazem    milliGRAM(s) Oral daily  influenza   Vaccine 0.5 milliLiter(s) IntraMuscular once  levalbuterol Inhalation 0.63 milliGRAM(s) Inhalation every 6 hours  levETIRAcetam 250 milliGRAM(s) Oral daily  montelukast 10 milliGRAM(s) Oral daily  multivitamin 1 Tablet(s) Oral daily  pantoprazole    Tablet 40 milliGRAM(s) Oral before breakfast  tiotropium 18 MICROgram(s) Capsule 1 Capsule(s) Inhalation daily    MEDICATIONS  (PRN):  docusate sodium 100 milliGRAM(s) Oral daily PRN Constipation  senna 2 Tablet(s) Oral at bedtime PRN Constipation      Vital Signs Last 24 Hrs  T(C): 36.6 (25 Nov 2017 20:30), Max: 36.6 (25 Nov 2017 20:30)  T(F): 97.9 (25 Nov 2017 20:30), Max: 97.9 (25 Nov 2017 20:30)  HR: 71 (26 Nov 2017 05:09) (66 - 78)  BP: 178/50 (26 Nov 2017 05:09) (133/64 - 178/50)  BP(mean): --  RR: 18 (26 Nov 2017 05:09) (18 - 18)  SpO2: 94% (26 Nov 2017 05:09) (94% - 96%)  CAPILLARY BLOOD GLUCOSE        I&O's Summary    25 Nov 2017 07:01  -  26 Nov 2017 07:00  --------------------------------------------------------  IN: 390 mL / OUT: 0 mL / NET: 390 mL        PHYSICAL EXAM:  GENERAL: NAD, laying in bed, NC in place  HEAD:  Atraumatic, Normocephalic  EYES: EOMI, PERRLA, conjunctiva and sclera clear  ENMT: MMM, oropharynx clear  NECK: Supple, no LAD   NERVOUS SYSTEM: AAOx1, no focal deficits  CHEST/LUNG: Bibasilar rales present today.  HEART: Regular rate and rhythm; +SM  ABDOMEN: Soft, Nontender, Nondistended; Bowel sounds present  EXTREMITIES: 2+ Peripheral Pulses, No clubbing, cyanosis, or edema  LYMPH: No lymphadenopathy noted  SKIN: No rashes or lesions      LABS:                        11.7   17.0  )-----------( 405      ( 26 Nov 2017 07:11 )             37.5     11-26    142  |  102  |  40<H>  ----------------------------<  161<H>  5.0   |  23  |  1.82<H>    Ca    9.4      26 Nov 2017 07:12  Phos  3.3     11-25  Mg     2.2     11-25        RADIOLOGY & ADDITIONAL TESTS:    Imaging: Urgent CXR ordered (pending)    Consultant(s) Notes Reviewed: Cardiology

## 2017-11-26 NOTE — PROGRESS NOTE ADULT - SUBJECTIVE AND OBJECTIVE BOX
ALETALIBAN    Patient is a 87y old  Female who presents with a chief complaint of Shortness of breath,CHF,PAF,MR doing well.No CP or SOB.Yesterday PAF,now in NSR.      Allergies    No Known Allergies    Intolerances      MEDICATIONS  (STANDING):  amiodarone    Tablet 200 milliGRAM(s) Oral daily  apixaban 2.5 milliGRAM(s) Oral every 12 hours  atorvastatin 10 milliGRAM(s) Oral at bedtime  buDESOnide   0.5 milliGRAM(s) Respule 0.5 milliGRAM(s) Inhalation two times a day  cholecalciferol 2000 Unit(s) Oral daily  diltiazem    milliGRAM(s) Oral daily  influenza   Vaccine 0.5 milliLiter(s) IntraMuscular once  levalbuterol Inhalation 0.63 milliGRAM(s) Inhalation every 6 hours  levETIRAcetam 250 milliGRAM(s) Oral daily  montelukast 10 milliGRAM(s) Oral daily  multivitamin 1 Tablet(s) Oral daily  pantoprazole    Tablet 40 milliGRAM(s) Oral before breakfast  tiotropium 18 MICROgram(s) Capsule 1 Capsule(s) Inhalation daily    MEDICATIONS  (PRN):  docusate sodium 100 milliGRAM(s) Oral daily PRN Constipation  senna 2 Tablet(s) Oral at bedtime PRN Constipation      ROS:  Positive:    General: Denies weight loss, fevers, rash, decreased hearing  Cardiac: Denies chest pain, SOB, GILBERT, orthopnea, PND, claudication, edema, snoring, daytime somnolence, palpitations, syncope  Resp: Denies SOB, GILBERT, cough, sputum, wheezing, hemoptysis  GI: Denies change in bowel habits, diarrhea, weight loss, melena, tarry stools,   nausea, vomiting, jaundice, abdominal pain, dysphagia  : Denies dysuria, nocturia, hematuria  Neuro: Denies tinnitus, headache, visual changes, weakness, dizziness or vertigo  Musculoskeletal: Denies neck pain back pain joint pain.  Skin: Denies rash, itching, dryness.  Endocrine: Denies polydipsia, polyuria  Psychiatric: Denies depression, anxiety      PHYSICAL EXAM:  Vital Signs Last 24 Hrs  T(C): 36.6 (2017 20:30), Max: 36.6 (2017 20:30)  T(F): 97.9 (2017 20:30), Max: 97.9 (2017 20:30)  HR: 71 (2017 05:09) (66 - 78)  BP: 178/50 (2017 05:09) (133/64 - 178/50)  BP(mean): --  RR: 18 (2017 05:09) (18 - 18)  SpO2: 94% (2017 05:09) (94% - 96%)  Daily     Daily Weight in k.2 (2017 05:09)  I&O's Summary    2017 07:01  -  2017 07:00  --------------------------------------------------------  IN: 220 mL / OUT: 0 mL / NET: 220 mL    2017 07:01  -  2017 06:14  --------------------------------------------------------  IN: 390 mL / OUT: 0 mL / NET: 390 mL        General Appearance: 	 Alert, cooperative, no distress  HEENT: normocephalic, atraumatic, PERRLA, EOMI, conjunctiva normal, sclera anicteric,   Neck: no JVD,  carotid 2+  bilaterally without bruits, thyroid normal to inspection and palpation, no adenopathy, trachea midline  Lungs:  clear to auscultation and percussion bilaterally  Cor:  pmi 5th ICS MCL, regular rate and rhythm, S1 normal intensity, S2 normal intensity,2/6 apical systolic murmur  Abdomen:	 soft, non-tender; bowel sounds normal; no masses,  no organomegaly  Extremities: without cyanosis, clubbing or edema  Vasc: 2-+ PT and DP pulses; no varicosities  Neurologic: A&O x 3 (time, place, person). Symmetric strength; limited exam  Musculoskeletal: no kyphosis, scoliosis; normal gait, normal tone  Skin: no rashes; limited exam    EKG:  Telemetry:    Labs:  CBC Full  -  ( 2017 06:48 )  WBC Count : 13.1 K/uL  Hemoglobin : 11.0 g/dL  Hematocrit : 34.9 %  Platelet Count - Automated : 376 K/uL  Mean Cell Volume : 87.8 fl  Mean Cell Hemoglobin : 27.6 pg  Mean Cell Hemoglobin Concentration : 31.4 gm/dL  Auto Neutrophil # : x  Auto Lymphocyte # : x  Auto Monocyte # : x  Auto Eosinophil # : x  Auto Basophil # : x  Auto Neutrophil % : x  Auto Lymphocyte % : x  Auto Monocyte % : x  Auto Eosinophil % : x  Auto Basophil % : x          Impression/Plan:Patient with CHF,PAF,HTN much improved.Stable HR,BP and O2 sat.Stable back in NSR.Continue amiodarone,Dilt. and eliquis.OOB/PT.D/C planning.        Esdras Rodriguez MD, [facility]  [facility]

## 2017-11-26 NOTE — PROGRESS NOTE ADULT - PROBLEM SELECTOR PLAN 3
- Volume status improved; Monitoring off lasix per cardiology  - Strict I/O's not possible - patient is incontinent mostly.   - Monitor BMP, keep K above 4 and Mg above 2 per pulm  - Daily weights  - Wean supplemental O2 as tolerated

## 2017-11-26 NOTE — PROGRESS NOTE ADULT - PROBLEM SELECTOR PLAN 2
- Patient w/ hx of chornic A.fib (not on AC), found to be in A.fib w/ RVR w/ HR up to 150s on admission.   - During this admission, patient has been in and out of Atrial fibrillation (including yesterday for a couple of hours) and is presently in sinus  -c/w cardizem 120 qd per cardiology  -S/p amiodarone loading, currently on 200 qd per cardiology  -c/w eliquis 2.5 bid for anticoagulation  -telemetry monitoring

## 2017-11-27 VITALS
HEART RATE: 73 BPM | RESPIRATION RATE: 18 BRPM | DIASTOLIC BLOOD PRESSURE: 53 MMHG | SYSTOLIC BLOOD PRESSURE: 103 MMHG | OXYGEN SATURATION: 97 % | TEMPERATURE: 98 F

## 2017-11-27 DIAGNOSIS — I10 ESSENTIAL (PRIMARY) HYPERTENSION: ICD-10-CM

## 2017-11-27 LAB
ANION GAP SERPL CALC-SCNC: 14 MMOL/L — SIGNIFICANT CHANGE UP (ref 5–17)
BUN SERPL-MCNC: 33 MG/DL — HIGH (ref 7–23)
CALCIUM SERPL-MCNC: 9.1 MG/DL — SIGNIFICANT CHANGE UP (ref 8.4–10.5)
CHLORIDE SERPL-SCNC: 103 MMOL/L — SIGNIFICANT CHANGE UP (ref 96–108)
CO2 SERPL-SCNC: 25 MMOL/L — SIGNIFICANT CHANGE UP (ref 22–31)
CREAT SERPL-MCNC: 1.67 MG/DL — HIGH (ref 0.5–1.3)
CULTURE RESULTS: NO GROWTH — SIGNIFICANT CHANGE UP
GLUCOSE SERPL-MCNC: 111 MG/DL — HIGH (ref 70–99)
HCT VFR BLD CALC: 37.4 % — SIGNIFICANT CHANGE UP (ref 34.5–45)
HGB BLD-MCNC: 12.1 G/DL — SIGNIFICANT CHANGE UP (ref 11.5–15.5)
MAGNESIUM SERPL-MCNC: 2.3 MG/DL — SIGNIFICANT CHANGE UP (ref 1.6–2.6)
MCHC RBC-ENTMCNC: 28.3 PG — SIGNIFICANT CHANGE UP (ref 27–34)
MCHC RBC-ENTMCNC: 32.4 GM/DL — SIGNIFICANT CHANGE UP (ref 32–36)
MCV RBC AUTO: 87.3 FL — SIGNIFICANT CHANGE UP (ref 80–100)
PLATELET # BLD AUTO: 380 K/UL — SIGNIFICANT CHANGE UP (ref 150–400)
POTASSIUM SERPL-MCNC: 4.6 MMOL/L — SIGNIFICANT CHANGE UP (ref 3.5–5.3)
POTASSIUM SERPL-SCNC: 4.6 MMOL/L — SIGNIFICANT CHANGE UP (ref 3.5–5.3)
RBC # BLD: 4.29 M/UL — SIGNIFICANT CHANGE UP (ref 3.8–5.2)
RBC # FLD: 14.7 % — HIGH (ref 10.3–14.5)
SODIUM SERPL-SCNC: 142 MMOL/L — SIGNIFICANT CHANGE UP (ref 135–145)
SPECIMEN SOURCE: SIGNIFICANT CHANGE UP
WBC # BLD: 15.6 K/UL — HIGH (ref 3.8–10.5)
WBC # FLD AUTO: 15.6 K/UL — HIGH (ref 3.8–10.5)

## 2017-11-27 PROCEDURE — 99239 HOSP IP/OBS DSCHRG MGMT >30: CPT

## 2017-11-27 PROCEDURE — 99233 SBSQ HOSP IP/OBS HIGH 50: CPT

## 2017-11-27 RX ORDER — DILTIAZEM HCL 120 MG
1 CAPSULE, EXT RELEASE 24 HR ORAL
Qty: 0 | Refills: 0 | DISCHARGE
Start: 2017-11-27

## 2017-11-27 RX ORDER — HYDROCHLOROTHIAZIDE 25 MG
12.5 TABLET ORAL EVERY OTHER DAY
Qty: 0 | Refills: 0 | Status: DISCONTINUED | OUTPATIENT
Start: 2017-11-27 | End: 2017-11-27

## 2017-11-27 RX ADMIN — Medication 100 MILLIGRAM(S): at 06:16

## 2017-11-27 RX ADMIN — Medication 0.5 MILLIGRAM(S): at 06:16

## 2017-11-27 RX ADMIN — Medication 120 MILLIGRAM(S): at 06:16

## 2017-11-27 RX ADMIN — AMIODARONE HYDROCHLORIDE 200 MILLIGRAM(S): 400 TABLET ORAL at 06:17

## 2017-11-27 RX ADMIN — Medication 1 TABLET(S): at 13:34

## 2017-11-27 RX ADMIN — Medication 2000 UNIT(S): at 13:35

## 2017-11-27 RX ADMIN — APIXABAN 2.5 MILLIGRAM(S): 2.5 TABLET, FILM COATED ORAL at 06:17

## 2017-11-27 RX ADMIN — LEVETIRACETAM 250 MILLIGRAM(S): 250 TABLET, FILM COATED ORAL at 13:35

## 2017-11-27 RX ADMIN — LEVALBUTEROL 0.63 MILLIGRAM(S): 1.25 SOLUTION, CONCENTRATE RESPIRATORY (INHALATION) at 06:16

## 2017-11-27 RX ADMIN — PANTOPRAZOLE SODIUM 40 MILLIGRAM(S): 20 TABLET, DELAYED RELEASE ORAL at 06:16

## 2017-11-27 NOTE — PROGRESS NOTE ADULT - ATTENDING COMMENTS
as above--situation is most c/w CHF and not PNA--no signs or sx to support (CXR c/w APE and not PNA)  Restarted on xopenex/pulmicort/spiriva; continue O2  Repeat CXR abnormal-"nodule"--dedicated CT of chest reveals bilateral effusions/atelectasis--c/w CHF  echo reveals significant AVdz--TAVR evaluation--CTS--medical rx  Cardiology management of AF (now NSR)  and diuretics-amiodarone--will need out pt PFTs/TFTs, LFTs  Rehab pending  Edwin Briones MD-Pulmonary   [phone]

## 2017-11-27 NOTE — PROGRESS NOTE ADULT - ATTENDING COMMENTS
#11 essential hypertension- BP not at goal. Started on HCTZ 12.5mg every other day as per cardiology.     Dispo: Anticipate pt medically ready for discharge tomorrow. For TIFFANY #11 essential hypertension- BP not at goal. Started on HCTZ 12.5mg every other day as per cardiology.     Dispo: Pt medically stable for discharge today to [facility]. Will need pulm follow up for out patient PFTs/TFTs, LFTs  Total time spent 35 minutes

## 2017-11-27 NOTE — PROGRESS NOTE ADULT - PROVIDER SPECIALTY LIST ADULT
Cardiology
Hospitalist
Hospitalist
Internal Medicine
Pulmonology
Cardiology
Cardiology
Pulmonology
Cardiology
Hospitalist
Internal Medicine
Pulmonology
Internal Medicine
Internal Medicine

## 2017-11-27 NOTE — PROGRESS NOTE ADULT - PROBLEM SELECTOR PLAN 2
- Patient w/ hx of chronic A.fib (not on AC), found to be in A.fib w/ RVR w/ HR up to 150s on admission.   - During this admission, patient has been in and out of Atrial fibrillation presently in sinus  - cardizem increased to 180 qd per cardiology  -S/p amiodarone loading, currently on 200 qd per cardiology  -c/w eliquis 2.5 bid for anticoagulation  -telemetry monitoring

## 2017-11-27 NOTE — PROGRESS NOTE ADULT - PROBLEM SELECTOR PLAN 7
- Patient w/ SCr 1.52 on admission (baseline SCr ~1.1); Likely 2/2 hypoperfusion  S/p 750 cc NS; Will not aggressively hydrate w/ IVF for now due to concern for acute HF exacerbation  - Trend SCr, downtrending to 1.67 this AM  - Renally dose medications

## 2017-11-27 NOTE — PROGRESS NOTE ADULT - SUBJECTIVE AND OBJECTIVE BOX
LIBAN RIVER  87y  Female      Patient is a 87y old  Female who presents with a chief complaint of Shortness of breath, generalized weakness, malaise for 1 week (2017 13:01)      INTERVAL HPI/OVERNIGHT EVENTS:  Pleasant this morning. Denies any complaints      REVIEW OF SYSTEMS: Unable to assess given mental status    T(C): 36.8 (17 @ 05:11), Max: 36.8 (17 @ 14:41)  HR: 79 (17 @ 05:11) (72 - 79)  BP: 180/76 (17 @ 05:11) (154/67 - 180/76)  RR: 18 (17 @ 05:11) (18 - 19)  SpO2: 95% (17 @ 05:11) (94% - 96%)  Wt(kg): --Vital Signs Last 24 Hrs  T(C): 36.8 (2017 05:11), Max: 36.8 (2017 14:41)  T(F): 98.2 (2017 05:11), Max: 98.2 (2017 14:41)  HR: 79 (2017 05:11) (72 - 79)  BP: 180/76 (2017 05:11) (154/67 - 180/76)  BP(mean): --  RR: 18 (2017 05:11) (18 - 19)  SpO2: 95% (2017 05:11) (94% - 96%)    PHYSICAL EXAM:  GENERAL: NAD, laying in bed, NC in place  HEAD:  Atraumatic, Normocephalic  EYES: EOMI, PERRLA, conjunctiva and sclera clear  ENMT: MMM, oropharynx clear  NECK: Supple, no LAD   NERVOUS SYSTEM: AAOx1, no focal deficits  CHEST/LUNG: CTA bilaterally.  HEART: Regular rate and rhythm; +SM  ABDOMEN: Soft, Nontender, Nondistended; Bowel sounds present  EXTREMITIES: 2+ Peripheral Pulses, No clubbing, cyanosis, or edema  SKIN: No rashes or lesions    Consultant(s) Notes Reviewed:  [x ] YES  [ ] NO  Care Discussed with Consultants/Other Providers [ x] YES  [ ] NO    LABS:                        12.1   15.6  )-----------( 380      ( 2017 06:39 )             37.4     11-    142  |  103  |  33<H>  ----------------------------<  111<H>  4.6   |  25  |  1.67<H>    Ca    9.1      2017 06:39  Mg     2.3             Urinalysis Basic - ( 2017 18:47 )    Color: Yellow / Appearance: Clear / S.015 / pH: x  Gluc: x / Ketone: Negative  / Bili: Negative / Urobili: Negative   Blood: x / Protein: Trace / Nitrite: Negative   Leuk Esterase: Negative / RBC: x / WBC x   Sq Epi: x / Non Sq Epi: x / Bacteria: x      CAPILLARY BLOOD GLUCOSE            Urinalysis Basic - ( 2017 18:47 )    Color: Yellow / Appearance: Clear / S.015 / pH: x  Gluc: x / Ketone: Negative  / Bili: Negative / Urobili: Negative   Blood: x / Protein: Trace / Nitrite: Negative   Leuk Esterase: Negative / RBC: x / WBC x   Sq Epi: x / Non Sq Epi: x / Bacteria: x        RADIOLOGY & ADDITIONAL TESTS:    Imaging Personally Reviewed:  [x ] YES  [ ] NO

## 2017-11-27 NOTE — PROGRESS NOTE ADULT - PROBLEM SELECTOR PROBLEM 10
Prophylactic measure
Dementia
Dementia
Prophylactic measure

## 2017-11-27 NOTE — PROGRESS NOTE ADULT - SUBJECTIVE AND OBJECTIVE BOX
CHIEF COMPLAINT: cold over all--no sob or cough or wheeze or Chest pain    Interval Events: none-await rehab    REVIEW OF SYSTEMS:  Constitutional: No fevers or chills. No weight loss. No fatigue or generalized malaise.  Eyes: No itching or discharge from the eyes  ENT: No ear pain. No ear discharge. No nasal congestion. No post nasal drip. No epistaxis. No throat pain. No sore throat. No difficulty swallowing.   CV: No chest pain. No palpitations. No lightheadedness or dizziness.   Resp: No dyspnea at rest. No dyspnea on exertion. No orthopnea. No wheezing. No cough. No stridor. No sputum production. No chest pain with respiration.  GI: No nausea. No vomiting. No diarrhea.  MSK: No joint pain or pain in any extremities  Integumentary: No skin lesions. No pedal edema.  Neurological: No gross motor weakness. No sensory changes.  [+ ] All other systems negative  [ ] Unable to assess ROS because ________    OBJECTIVE:  ICU Vital Signs Last 24 Hrs  T(C): 36.3 (2017 20:20), Max: 36.8 (2017 14:41)  T(F): 97.3 (2017 20:20), Max: 98.2 (2017 14:41)  HR: 72 (2017 20:20) (71 - 75)  BP: 155/63 (2017 20:20) (154/67 - 178/50)  BP(mean): --  ABP: --  ABP(mean): --  RR: 19 (2017 20:20) (18 - 19)  SpO2: 94% (2017 20:20) (94% - 96%)         @ :  -   @ 07:00  --------------------------------------------------------  IN: 390 mL / OUT: 0 mL / NET: 390 mL     @ : @ 04:53  --------------------------------------------------------  IN: 360 mL / OUT: 50 mL / NET: 310 mL      CAPILLARY BLOOD GLUCOSE          PHYSICAL EXAM: NAD in bed on NC  General: Awake, alert, oriented X 2.   HEENT: Atraumatic, normocephalic.                 Mallampatti Grade 2                No nasal congestion.                No tonsillar or pharyngeal exudates.  Lymph Nodes: No palpable lymphadenopathy  Neck: No JVD. No carotid bruit.   Respiratory: reduced chest expansion                      dullness at bases to percussion                         Normal and equal air entry                         No wheeze, rhonchi or rales.  Cardiovascular: S1 S2 normal. ++ murmurs, no rubs or gallops.   Abdomen: Soft, non-tender, non-distended. No organomegaly.  Extremities: Warm to touch. Peripheral pulse palpable. No pedal edema.   Skin: No rashes or skin lesions  Neurological: Motor and sensory examination equal and normal in all four extremities.  Psychiatry: Appropriate mood and affect.    HOSPITAL MEDICATIONS:  MEDICATIONS  (STANDING):  amiodarone    Tablet 200 milliGRAM(s) Oral daily  apixaban 2.5 milliGRAM(s) Oral every 12 hours  atorvastatin 10 milliGRAM(s) Oral at bedtime  buDESOnide   0.5 milliGRAM(s) Respule 0.5 milliGRAM(s) Inhalation two times a day  cholecalciferol 2000 Unit(s) Oral daily  diltiazem    milliGRAM(s) Oral daily  influenza   Vaccine 0.5 milliLiter(s) IntraMuscular once  levalbuterol Inhalation 0.63 milliGRAM(s) Inhalation every 6 hours  levETIRAcetam 250 milliGRAM(s) Oral daily  montelukast 10 milliGRAM(s) Oral daily  multivitamin 1 Tablet(s) Oral daily  pantoprazole    Tablet 40 milliGRAM(s) Oral before breakfast  tiotropium 18 MICROgram(s) Capsule 1 Capsule(s) Inhalation daily    MEDICATIONS  (PRN):  docusate sodium 100 milliGRAM(s) Oral daily PRN Constipation  senna 2 Tablet(s) Oral at bedtime PRN Constipation      LABS:                        11.7   17.0  )-----------( 405      ( 2017 07:11 )             37.5     11-    142  |  102  |  40<H>  ----------------------------<  161<H>  5.0   |  23  |  1.82<H>    Ca    9.4      2017 07:12  Phos  3.3     11-25  Mg     2.2     11-25        Urinalysis Basic - ( 2017 18:47 )    Color: Yellow / Appearance: Clear / S.015 / pH: x  Gluc: x / Ketone: Negative  / Bili: Negative / Urobili: Negative   Blood: x / Protein: Trace / Nitrite: Negative   Leuk Esterase: Negative / RBC: x / WBC x   Sq Epi: x / Non Sq Epi: x / Bacteria: x            MICROBIOLOGY:     RADIOLOGY:  [ ] Reviewed and interpreted by me    Point of Care Ultrasound Findings:    PFT:    EKG:

## 2017-11-27 NOTE — PROGRESS NOTE ADULT - ASSESSMENT
87 year old female with dementia and aortic stenosis admitted with increased SOB and found in rapid AFIB. She is now rate controlled in AFIB and CHF is improved.  Paroxysms of NSR. Will need to obtain follow up TTE although due to cognitive dysfunction likely not candidate for TAVR. She should be placed on anticoagulation and to consider antiarrhythmic therapy as well. Would start amiodarone to hopefully maintain NSR and avoid long term anticoagulation due to her age and renal function.
87 year old female with dementia and aortic valve disease admitted with increased SOB and found in rapid AFIB.  She converted to NSR yesterday and is receiving amiodarone load. CT surgery consult noted and appreciated.  CHF remains improved.
87 year old female with dementia and aortic valve disease admitted with increased SOB and found in rapid AFIB.  She has been predominantly in NSR since starting amiodarone. BP is slighlty elevated. CHF improved. Renal function at baseline.
Pt is an 88 yo F w/ hx of afib (not on AC), CVA, advanced dementia (AAOx1 baseline), epilepsy (on keppra), fall, HTN, and pulmonary disease who presents w/ generalized weakness, malaise, and SOB x 1 week, found to meet SIRS criteria possibly 2/2 PNA, in afib w/ RVR, and w/ signs of overload on physical exam and CXR concerning for acute HF exacerbation.
87 year old female with dementia and aortic valve disease admitted with increased SOB and found in rapid AFIB.  Rate is better controlled but still slightly elevated. CHF is improved. TTE noted. Will ask TAVR team to evaluate but may not be candidate due to cognitive dysfunction. To start anticoagulation and increase amiodarone dosing to load to hopefully maintain NSR and avoid long term anticoagulation due to her age and renal function.
87 year old female with dementia and aortic valve disease admitted with increased SOB and found in rapid AFIB.  She remains NSR since starting amiodarone load. No CHF on exam off lasix. She should complete amiodarone load and then start maintenance dose. Not candidate for TAVR and would note recommend surgical AVR.
87-year-old female w/ hx of afib (not on AC), CVA, advanced dementia (AAOx1 baseline), epilepsy (on keppra), fall, HTN, and pulmonary disease who presents w/ generalized weakness, malaise, and SOB x 1 week, found to meet SIRS criteria possibly 2/2 PNA, in afib w/ RVR, and w/ signs of overload on physical exam and CXR concerning for acute HF exacerbation.
87-year-old female w/ hx of afib (not on AC), CVA, advanced dementia (AAOx1 baseline), epilepsy (on keppra), fall, HTN, and pulmonary disease who presents w/ generalized weakness, malaise, and SOB x 1 week, found to meet SIRS criteria possibly 2/2 PNA, in afib w/ RVR, and w/ signs of overload on physical exam and CXR concerning for acute HF exacerbation. Now patient appears improved.
87F with chronic atrial fibrillation (not on AC), h/o CVA, advanced dementia (AAOx1 baseline), epilepsy (on keppra), fall, HTN, and pulmonary disease who presents w/ generalized weakness, malaise, and SOB x 1 week, on admission found had A.fib with RVR and w/ signs of overload on physical exam and CXR concerning for acute diastolic CHF exacerbation. Now patient appears improved.
87F with chronic atrial fibrillation (not on AC), h/o CVA, advanced dementia (AAOx1 baseline), epilepsy (on keppra), fall, HTN, and pulmonary disease who presents w/ generalized weakness, malaise, and SOB x 1 week, on admission found had A.fib with RVR and w/ signs of overload on physical exam and CXR concerning for acute diastolic CHF exacerbation. Now patient appears improved.
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  She was found to be in Afib RVR, hypotensive and hypoxic. CXR is concerning Acute pulm edema.  The decompensated CHF is likly secondary to the rapid Afib in the setting of PNA and underlying valvular disease.  Troponin is mildly elevated due to increased demand.   No evidence for  PNA.
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  She was found to be in Afib RVR, hypotensive and hypoxic. CXR is concerning Acute pulm edema.  The decompensated CHF is likly secondary to the rapid Afib in the setting of PNA and underlying valvular disease.  Troponin is mildly elevated due to increased demand.   No evidence for  PNA.
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  She was found to be in Afib RVR, hypotensive and hypoxic. CXR is concerning Acute pulm edema.  The decompensated CHF is likly secondary to the rapid Afib in the setting of PNA and underlying valvular disease.  Troponin is mildly elevated due to increased demand. Doubt PNA.
Assessment and Plan:   · Assessment		  87 year old female with dementia and aortic valve disease admitted with increased SOB and found in rapid AFIB.    Problem/Plan - 1:  ·  Problem: Paroxysmal atrial fibrillation.  Plan: Converted to NSR.   continue amiodarone load, Continue cardizem. D/C planning once load is completed. On eliquis.     Problem/Plan - 2:  ·  Problem: Acute on chronic diastolic heart failure.  Plan: Improved. Continue current regimen.     Problem/Plan - 3:  ·  Problem: Aortic valve disease.  Plan: CTS consult noted. medical therapy.     Problem/Plan - 4:  ·  Problem: CKD (chronic kidney disease).  Plan: Stable. Monitor BT.
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  She was found to be in Afib RVR, hypotensive and hypoxic. CXR is concerning Acute pulm edema.  The decompensated CHF is likly secondary to the rapid Afib in the setting of PNA and underlying valvular disease.  Troponin is mildly elevated due to increased demand.   No evidence for  PNA.
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  She was found to be in Afib RVR, hypotensive and hypoxic. CXR is concerning Acute pulm edema.  The decompensated CHF is likly secondary to the rapid Afib in the setting of PNA and underlying valvular disease.  Troponin is mildly elevated due to increased demand.   No evidence for  PNA.
87F with chronic atrial fibrillation (not on AC), h/o CVA, advanced dementia (AAOx1 baseline), epilepsy (on keppra), fall, HTN, and pulmonary disease who presents w/ generalized weakness, malaise, and SOB x 1 week, on admission found had A.fib with RVR and w/ signs of overload on physical exam and CXR concerning for acute diastolic CHF exacerbation. Now patient appears improved.
87-year-old female w/ hx of afib (not on AC), CVA, advanced dementia (AAOx1 baseline), epilepsy (on keppra), fall, HTN, and pulmonary disease who presents w/ generalized weakness, malaise, and SOB x 1 week, found to meet SIRS criteria possibly 2/2 PNA, in afib w/ RVR, and w/ signs of overload on physical exam and CXR concerning for acute HF exacerbation. Now patient appears improved.
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  She was found to be in Afib RVR, hypotensive and hypoxic. CXR is concerning Acute pulm edema.  The decompensated CHF is likly secondary to the rapid Afib in the setting of PNA and underlying valvular disease.  Troponin is mildly elevated due to increased demand.   No evidence for  PNA.
87-year-old female w/ hx of afib (not on AC), CVA, advanced dementia (AAOx1 baseline), epilepsy (on keppra), fall, HTN, and pulmonary disease who presents w/ generalized weakness, malaise, and SOB x 1 week, found to meet SIRS criteria possibly 2/2 PNA, in afib w/ RVR, and w/ signs of overload on physical exam and CXR concerning for acute HF exacerbation.

## 2017-11-27 NOTE — PROGRESS NOTE ADULT - PROBLEM SELECTOR PLAN 6
eliquis  sequential teds stockings  early ambulation  GI prophylaxis:  PPI pre breakfast  aspiration precautions-all meals are to OOB as able

## 2017-11-27 NOTE — PROGRESS NOTE ADULT - PROBLEM SELECTOR PLAN 8
- Patient has hx of epilepsy  - C/w home keppra
- Patient has hx of epilepsy  - C/w home keppra
Patient has hx of epilepsy  C/w home keppra
Patient has hx of epilepsy  C/w home keppra
- Patient has hx of epilepsy  - C/w home keppra
- Patient w/ SOB x 1 wk, found to be hypoxic and tachypneic   - NC to maintain O2 sat > 90%; Wean as tolerated  - C/w montelukast, budesonide, xopenex, and spiriva per pulm  - Pulmonary toilet-incentive spirometry, Chest Pt-acapella/chest vest up to 5x daily per pulm  - Will need PFTs/TFTs, LFTs outpatient per pulm
- Patient w/ SOB x 1 wk, found to be hypoxic and tachypneic   - NC to maintain O2 sat > 90%; Wean as tolerated  - C/w montelukast, budesonide, xopenex, and spiriva per pulm  - Pulmonary toilet-incentive spirometry, Chest Pt-acapella/chest vest up to 5x daily per pulm  - Will need PFTs/TFTs, LFTs outpatient per pulm
Patient has hx of epilepsy  C/w home keppra

## 2017-11-27 NOTE — PROGRESS NOTE ADULT - PROBLEM SELECTOR PLAN 1
WBC count now downtrending, increasing over the last 2 days. Remains afebrile, CXR clear, UA negative. No source for possible infection.  Monitor for fever and change in mental status.

## 2017-11-27 NOTE — PROGRESS NOTE ADULT - PROBLEM SELECTOR PLAN 4
Increase cardizem CD 180mg per day and start  chlorthalidone 12.5mg every other day.  Stable for TIFFANY.

## 2017-11-27 NOTE — PROGRESS NOTE ADULT - SUBJECTIVE AND OBJECTIVE BOX
Patient is a 87y old  Female who presents with a chief complaint of Shortness of breath, generalized weakness, malaise for 1 week (2017 13:01)    She remains without specific complaints. Confused.     Allergies    No Known Allergies    Intolerances      MEDICATIONS  (STANDING):  amiodarone    Tablet 200 milliGRAM(s) Oral daily  apixaban 2.5 milliGRAM(s) Oral every 12 hours  atorvastatin 10 milliGRAM(s) Oral at bedtime  buDESOnide   0.5 milliGRAM(s) Respule 0.5 milliGRAM(s) Inhalation two times a day  cholecalciferol 2000 Unit(s) Oral daily  diltiazem    milliGRAM(s) Oral daily  influenza   Vaccine 0.5 milliLiter(s) IntraMuscular once  levalbuterol Inhalation 0.63 milliGRAM(s) Inhalation every 6 hours  levETIRAcetam 250 milliGRAM(s) Oral daily  montelukast 10 milliGRAM(s) Oral daily  multivitamin 1 Tablet(s) Oral daily  pantoprazole    Tablet 40 milliGRAM(s) Oral before breakfast  tiotropium 18 MICROgram(s) Capsule 1 Capsule(s) Inhalation daily    MEDICATIONS  (PRN):  docusate sodium 100 milliGRAM(s) Oral daily PRN Constipation  senna 2 Tablet(s) Oral at bedtime PRN Constipation      PHYSICAL EXAM:  Vital Signs Last 24 Hrs  T(C): 36.8 (2017 05:11), Max: 36.8 (2017 14:41)  T(F): 98.2 (2017 05:11), Max: 98.2 (2017 14:41)  HR: 79 (2017 05:11) (72 - 79)  BP: 180/76 (2017 05:11) (154/67 - 180/76)  BP(mean): --  RR: 18 (2017 05:11) (18 - 19)  SpO2: 95% (2017 05:11) (94% - 96%)  Daily     Daily Weight in k (2017 05:11)  I&O's Summary    2017 07:01  -  2017 07:00  --------------------------------------------------------  IN: 360 mL / OUT: 50 mL / NET: 310 mL    General Appearance: 	awake   HEENT: normocephalic, atraumatic  Neck: no JVD,  carotid 2+  bilaterally with. transmitted murmur  Lungs:  increased expiratory phase bilaterally  Cor:  pmi 5th ICS MCL,regular rate and rhythm, S1 normal intensity, S2 decreased intensity, Grade II/VI mid to late peaking crescendo decrescendo systolic murmur ULSB. Grade II/Vi diastolic murmur ULSB  Abdomen: soft, non-tender; bowel sounds normal; no masses,  no organomegaly  Extremities: without cyanosis, clubbing or edema  Vasc: 2-+ PT and DP pulses; LE varicosities      Labs:  CBC Full  -  ( 2017 06:39 )  WBC Count : 15.6 K/uL  Hemoglobin : 12.1 g/dL  Hematocrit : 37.4 %  Platelet Count - Automated : 380 K/uL  Mean Cell Volume : 87.3 fl  Mean Cell Hemoglobin : 28.3 pg  Mean Cell Hemoglobin Concentration : 32.4 gm/dL  Auto Neutrophil # : x  Auto Lymphocyte # : x  Auto Monocyte # : x  Auto Eosinophil # : x  Auto Basophil # : x  Auto Neutrophil % : x  Auto Lymphocyte % : x  Auto Monocyte % : x  Auto Eosinophil % : x  Auto Basophil % : x    11-27    142  |  103  |  33<H>  ----------------------------<  111<H>  4.6   |  25  |  1.67<H>    Ca    9.1      2017 06:39  Mg     2.3     11-            Urinalysis Basic - ( 2017 18:47 )    Color: Yellow / Appearance: Clear / S.015 / pH: x  Gluc: x / Ketone: Negative  / Bili: Negative / Urobili: Negative   Blood: x / Protein: Trace / Nitrite: Negative   Leuk Esterase: Negative / RBC: x / WBC x   Sq Epi: x / Non Sq Epi: x / Bacteria: x                  Jake Macias MD [facility]  [phone]

## 2017-11-27 NOTE — PROGRESS NOTE ADULT - PROBLEM SELECTOR PROBLEM 8
Epilepsy
COPD (chronic obstructive pulmonary disease)
COPD (chronic obstructive pulmonary disease)
Epilepsy

## 2017-12-18 ENCOUNTER — CLINICAL ADVICE (OUTPATIENT)
Age: 82
End: 2017-12-18

## 2017-12-19 PROCEDURE — 80053 COMPREHEN METABOLIC PANEL: CPT

## 2017-12-19 PROCEDURE — 93306 TTE W/DOPPLER COMPLETE: CPT

## 2017-12-19 PROCEDURE — 83605 ASSAY OF LACTIC ACID: CPT

## 2017-12-19 PROCEDURE — 87633 RESP VIRUS 12-25 TARGETS: CPT

## 2017-12-19 PROCEDURE — 97163 PT EVAL HIGH COMPLEX 45 MIN: CPT

## 2017-12-19 PROCEDURE — 81003 URINALYSIS AUTO W/O SCOPE: CPT

## 2017-12-19 PROCEDURE — 85610 PROTHROMBIN TIME: CPT

## 2017-12-19 PROCEDURE — 80048 BASIC METABOLIC PNL TOTAL CA: CPT

## 2017-12-19 PROCEDURE — 96374 THER/PROPH/DIAG INJ IV PUSH: CPT

## 2017-12-19 PROCEDURE — 87040 BLOOD CULTURE FOR BACTERIA: CPT

## 2017-12-19 PROCEDURE — 87581 M.PNEUMON DNA AMP PROBE: CPT

## 2017-12-19 PROCEDURE — 84132 ASSAY OF SERUM POTASSIUM: CPT

## 2017-12-19 PROCEDURE — 94640 AIRWAY INHALATION TREATMENT: CPT

## 2017-12-19 PROCEDURE — 99285 EMERGENCY DEPT VISIT HI MDM: CPT | Mod: 25

## 2017-12-19 PROCEDURE — 82803 BLOOD GASES ANY COMBINATION: CPT

## 2017-12-19 PROCEDURE — 97116 GAIT TRAINING THERAPY: CPT

## 2017-12-19 PROCEDURE — 82553 CREATINE MB FRACTION: CPT

## 2017-12-19 PROCEDURE — 85027 COMPLETE CBC AUTOMATED: CPT

## 2017-12-19 PROCEDURE — 93005 ELECTROCARDIOGRAM TRACING: CPT

## 2017-12-19 PROCEDURE — 97530 THERAPEUTIC ACTIVITIES: CPT

## 2017-12-19 PROCEDURE — 84484 ASSAY OF TROPONIN QUANT: CPT

## 2017-12-19 PROCEDURE — 83735 ASSAY OF MAGNESIUM: CPT

## 2017-12-19 PROCEDURE — 87486 CHLMYD PNEUM DNA AMP PROBE: CPT

## 2017-12-19 PROCEDURE — 71250 CT THORAX DX C-: CPT

## 2017-12-19 PROCEDURE — 85014 HEMATOCRIT: CPT

## 2017-12-19 PROCEDURE — 85730 THROMBOPLASTIN TIME PARTIAL: CPT

## 2017-12-19 PROCEDURE — 82330 ASSAY OF CALCIUM: CPT

## 2017-12-19 PROCEDURE — 83880 ASSAY OF NATRIURETIC PEPTIDE: CPT

## 2017-12-19 PROCEDURE — 71045 X-RAY EXAM CHEST 1 VIEW: CPT

## 2017-12-19 PROCEDURE — 87798 DETECT AGENT NOS DNA AMP: CPT

## 2017-12-19 PROCEDURE — 87086 URINE CULTURE/COLONY COUNT: CPT

## 2017-12-19 PROCEDURE — 82435 ASSAY OF BLOOD CHLORIDE: CPT

## 2017-12-19 PROCEDURE — 82550 ASSAY OF CK (CPK): CPT

## 2017-12-19 PROCEDURE — 82947 ASSAY GLUCOSE BLOOD QUANT: CPT

## 2017-12-19 PROCEDURE — 84100 ASSAY OF PHOSPHORUS: CPT

## 2017-12-19 PROCEDURE — 84295 ASSAY OF SERUM SODIUM: CPT

## 2017-12-26 ENCOUNTER — APPOINTMENT (OUTPATIENT)
Dept: HOME HEALTH SERVICES | Facility: HOME HEALTH | Age: 82
End: 2017-12-26
Payer: MEDICARE

## 2017-12-26 VITALS
BODY MASS INDEX: 26.38 KG/M2 | WEIGHT: 114 LBS | DIASTOLIC BLOOD PRESSURE: 60 MMHG | RESPIRATION RATE: 15 BRPM | HEIGHT: 55 IN | HEART RATE: 54 BPM | OXYGEN SATURATION: 95 % | SYSTOLIC BLOOD PRESSURE: 130 MMHG | TEMPERATURE: 98.8 F

## 2017-12-26 PROCEDURE — 99496 TRANSJ CARE MGMT HIGH F2F 7D: CPT

## 2017-12-26 RX ORDER — LEVETIRACETAM 100 MG/ML
100 SOLUTION ORAL
Qty: 75 | Refills: 0 | Status: DISCONTINUED | COMMUNITY
Start: 2017-12-22 | End: 2017-12-26

## 2017-12-26 RX ORDER — CEFUROXIME AXETIL 250 MG/1
250 TABLET ORAL
Qty: 6 | Refills: 0 | Status: COMPLETED | COMMUNITY
Start: 2017-12-23 | End: 2017-12-26

## 2017-12-28 ENCOUNTER — CLINICAL ADVICE (OUTPATIENT)
Age: 82
End: 2017-12-28

## 2017-12-28 ENCOUNTER — MOBILE ON CALL (OUTPATIENT)
Age: 82
End: 2017-12-28

## 2018-01-09 ENCOUNTER — CLINICAL ADVICE (OUTPATIENT)
Age: 83
End: 2018-01-09

## 2018-01-22 ENCOUNTER — MEDICATION RENEWAL (OUTPATIENT)
Age: 83
End: 2018-01-22

## 2018-01-22 RX ORDER — VERAPAMIL HYDROCHLORIDE 40 MG/1
40 TABLET ORAL
Qty: 90 | Refills: 0 | Status: COMPLETED | COMMUNITY
Start: 2017-12-26 | End: 2018-01-22

## 2018-02-23 ENCOUNTER — MEDICATION RENEWAL (OUTPATIENT)
Age: 83
End: 2018-02-23

## 2018-02-23 RX ORDER — FAMOTIDINE 20 MG/1
20 TABLET, FILM COATED ORAL
Qty: 30 | Refills: 0 | Status: COMPLETED | COMMUNITY
Start: 2017-12-22 | End: 2018-02-23

## 2018-02-26 ENCOUNTER — MEDICATION RENEWAL (OUTPATIENT)
Age: 83
End: 2018-02-26

## 2018-03-23 ENCOUNTER — MEDICATION RENEWAL (OUTPATIENT)
Age: 83
End: 2018-03-23

## 2018-03-27 ENCOUNTER — APPOINTMENT (OUTPATIENT)
Dept: HOME HEALTH SERVICES | Facility: HOME HEALTH | Age: 83
End: 2018-03-27
Payer: MEDICARE

## 2018-03-27 VITALS
TEMPERATURE: 98 F | DIASTOLIC BLOOD PRESSURE: 60 MMHG | HEART RATE: 66 BPM | SYSTOLIC BLOOD PRESSURE: 128 MMHG | OXYGEN SATURATION: 97 % | RESPIRATION RATE: 16 BRPM

## 2018-03-27 PROCEDURE — 99350 HOME/RES VST EST HIGH MDM 60: CPT

## 2018-03-27 RX ORDER — MIRTAZAPINE 15 MG/1
15 TABLET, FILM COATED ORAL
Qty: 90 | Refills: 3 | Status: COMPLETED | COMMUNITY
Start: 2017-12-22 | End: 2018-03-27

## 2018-03-27 RX ORDER — LEVALBUTEROL HYDROCHLORIDE 1.25 MG/3ML
1.25 SOLUTION RESPIRATORY (INHALATION)
Qty: 72 | Refills: 0 | Status: COMPLETED | COMMUNITY
Start: 2017-07-14

## 2018-03-27 RX ORDER — PHENOL 1.4 %
1.4 AEROSOL, SPRAY (ML) MUCOUS MEMBRANE
Qty: 1 | Refills: 0 | Status: COMPLETED | COMMUNITY
Start: 2018-01-09 | End: 2018-03-27

## 2018-03-29 LAB
ALBUMIN SERPL ELPH-MCNC: 3.4 G/DL
ALP BLD-CCNC: 76 U/L
ALT SERPL-CCNC: 11 U/L
ANION GAP SERPL CALC-SCNC: 16 MMOL/L
AST SERPL-CCNC: 21 U/L
BASOPHILS # BLD AUTO: 0.04 K/UL
BASOPHILS NFR BLD AUTO: 0.5 %
BILIRUB SERPL-MCNC: 0.3 MG/DL
BUN SERPL-MCNC: 37 MG/DL
CALCIUM SERPL-MCNC: 8.9 MG/DL
CHLORIDE SERPL-SCNC: 103 MMOL/L
CO2 SERPL-SCNC: 23 MMOL/L
CREAT SERPL-MCNC: 1.78 MG/DL
EOSINOPHIL # BLD AUTO: 0.12 K/UL
EOSINOPHIL NFR BLD AUTO: 1.5 %
HBA1C MFR BLD HPLC: 5 %
HCT VFR BLD CALC: 31.6 %
HGB BLD-MCNC: 10.2 G/DL
IMM GRANULOCYTES NFR BLD AUTO: 0.1 %
LYMPHOCYTES # BLD AUTO: 1.28 K/UL
LYMPHOCYTES NFR BLD AUTO: 16.2 %
MAN DIFF?: NORMAL
MCHC RBC-ENTMCNC: 29.1 PG
MCHC RBC-ENTMCNC: 32.3 GM/DL
MCV RBC AUTO: 90.3 FL
MONOCYTES # BLD AUTO: 0.71 K/UL
MONOCYTES NFR BLD AUTO: 9 %
NEUTROPHILS # BLD AUTO: 5.74 K/UL
NEUTROPHILS NFR BLD AUTO: 72.7 %
PLATELET # BLD AUTO: 310 K/UL
POTASSIUM SERPL-SCNC: 4.5 MMOL/L
PROT SERPL-MCNC: 6.4 G/DL
RBC # BLD: 3.5 M/UL
RBC # FLD: 15 %
SODIUM SERPL-SCNC: 142 MMOL/L
WBC # FLD AUTO: 7.9 K/UL

## 2018-04-08 ENCOUNTER — CLINICAL ADVICE (OUTPATIENT)
Age: 83
End: 2018-04-08

## 2018-04-10 ENCOUNTER — CLINICAL ADVICE (OUTPATIENT)
Age: 83
End: 2018-04-10

## 2018-04-10 ENCOUNTER — APPOINTMENT (OUTPATIENT)
Dept: HOME HEALTH SERVICES | Facility: HOME HEALTH | Age: 83
End: 2018-04-10

## 2018-04-10 VITALS
OXYGEN SATURATION: 94 % | HEART RATE: 70 BPM | DIASTOLIC BLOOD PRESSURE: 60 MMHG | TEMPERATURE: 98.7 F | RESPIRATION RATE: 18 BRPM | SYSTOLIC BLOOD PRESSURE: 120 MMHG

## 2018-04-12 ENCOUNTER — MOBILE ON CALL (OUTPATIENT)
Age: 83
End: 2018-04-12

## 2018-04-12 ENCOUNTER — CLINICAL ADVICE (OUTPATIENT)
Age: 83
End: 2018-04-12

## 2018-04-13 ENCOUNTER — CLINICAL ADVICE (OUTPATIENT)
Age: 83
End: 2018-04-13

## 2018-05-23 ENCOUNTER — MEDICATION RENEWAL (OUTPATIENT)
Age: 83
End: 2018-05-23

## 2018-06-05 ENCOUNTER — APPOINTMENT (OUTPATIENT)
Dept: HOME HEALTH SERVICES | Facility: HOME HEALTH | Age: 83
End: 2018-06-05
Payer: MEDICARE

## 2018-06-05 VITALS
HEART RATE: 67 BPM | TEMPERATURE: 97.8 F | RESPIRATION RATE: 18 BRPM | DIASTOLIC BLOOD PRESSURE: 80 MMHG | OXYGEN SATURATION: 97 % | SYSTOLIC BLOOD PRESSURE: 138 MMHG

## 2018-06-05 DIAGNOSIS — R39.81 FUNCTIONAL URINARY INCONTINENCE: ICD-10-CM

## 2018-06-05 DIAGNOSIS — R32 UNSPECIFIED URINARY INCONTINENCE: ICD-10-CM

## 2018-06-05 DIAGNOSIS — J44.1 CHRONIC OBSTRUCTIVE PULMONARY DISEASE WITH (ACUTE) EXACERBATION: ICD-10-CM

## 2018-06-05 DIAGNOSIS — R73.09 OTHER ABNORMAL GLUCOSE: ICD-10-CM

## 2018-06-05 DIAGNOSIS — Z87.09 PERSONAL HISTORY OF OTHER DISEASES OF THE RESPIRATORY SYSTEM: ICD-10-CM

## 2018-06-05 PROCEDURE — 99350 HOME/RES VST EST HIGH MDM 60: CPT

## 2018-06-05 RX ORDER — AZITHROMYCIN 250 MG/1
250 TABLET, FILM COATED ORAL
Qty: 2 | Refills: 0 | Status: COMPLETED | COMMUNITY
Start: 2018-04-09 | End: 2018-06-05

## 2018-06-05 RX ORDER — PREDNISONE 5 MG/1
5 TABLET ORAL
Qty: 4 | Refills: 0 | Status: COMPLETED | COMMUNITY
Start: 2018-04-09 | End: 2018-06-05

## 2018-06-06 PROBLEM — R39.81 FUNCTIONAL URINARY INCONTINENCE: Status: ACTIVE | Noted: 2017-08-15

## 2018-06-21 ENCOUNTER — MEDICATION RENEWAL (OUTPATIENT)
Age: 83
End: 2018-06-21

## 2018-07-09 ENCOUNTER — MEDICATION RENEWAL (OUTPATIENT)
Age: 83
End: 2018-07-09

## 2018-08-14 ENCOUNTER — APPOINTMENT (OUTPATIENT)
Dept: HOME HEALTH SERVICES | Facility: HOME HEALTH | Age: 83
End: 2018-08-14
Payer: MEDICARE

## 2018-08-14 VITALS
OXYGEN SATURATION: 96 % | DIASTOLIC BLOOD PRESSURE: 60 MMHG | RESPIRATION RATE: 18 BRPM | HEART RATE: 65 BPM | SYSTOLIC BLOOD PRESSURE: 125 MMHG

## 2018-08-14 PROCEDURE — 99350 HOME/RES VST EST HIGH MDM 60: CPT

## 2018-08-14 RX ORDER — TIOTROPIUM BROMIDE 18 UG/1
18 CAPSULE ORAL; RESPIRATORY (INHALATION) DAILY
Qty: 1 | Refills: 3 | Status: DISCONTINUED | COMMUNITY
Start: 2017-12-22 | End: 2018-08-14

## 2018-09-17 ENCOUNTER — MEDICATION RENEWAL (OUTPATIENT)
Age: 83
End: 2018-09-17

## 2018-10-18 ENCOUNTER — MEDICATION RENEWAL (OUTPATIENT)
Age: 83
End: 2018-10-18

## 2018-10-23 ENCOUNTER — APPOINTMENT (OUTPATIENT)
Dept: HOME HEALTH SERVICES | Facility: HOME HEALTH | Age: 83
End: 2018-10-23
Payer: MEDICARE

## 2018-10-23 VITALS
DIASTOLIC BLOOD PRESSURE: 60 MMHG | SYSTOLIC BLOOD PRESSURE: 110 MMHG | RESPIRATION RATE: 16 BRPM | OXYGEN SATURATION: 97 % | TEMPERATURE: 97.5 F | HEART RATE: 67 BPM

## 2018-10-23 PROCEDURE — 99350 HOME/RES VST EST HIGH MDM 60: CPT

## 2018-10-23 RX ORDER — AMIODARONE HYDROCHLORIDE 100 MG/1
100 TABLET ORAL DAILY
Qty: 30 | Refills: 5 | Status: DISCONTINUED | COMMUNITY
Start: 2017-12-22 | End: 2018-10-23

## 2018-10-23 RX ORDER — GREEN TEA/HOODIA GORDONII 315-12.5MG
CAPSULE ORAL
Qty: 6 | Refills: 0 | Status: COMPLETED | COMMUNITY
Start: 2018-04-09 | End: 2018-10-23

## 2018-11-25 ENCOUNTER — EMERGENCY (EMERGENCY)
Facility: HOSPITAL | Age: 83
LOS: 1 days | Discharge: HOME | End: 2018-11-25
Attending: EMERGENCY MEDICINE
Payer: MEDICARE

## 2018-11-25 ENCOUNTER — TRANSCRIPTION ENCOUNTER (OUTPATIENT)
Age: 83
End: 2018-11-25

## 2018-11-25 VITALS
HEART RATE: 59 BPM | TEMPERATURE: 98 F | RESPIRATION RATE: 17 BRPM | SYSTOLIC BLOOD PRESSURE: 129 MMHG | DIASTOLIC BLOOD PRESSURE: 94 MMHG | OXYGEN SATURATION: 97 %

## 2018-11-25 LAB
BLD GP AB SCN SERPL QL: NEGATIVE — SIGNIFICANT CHANGE UP
RH IG SCN BLD-IMP: POSITIVE — SIGNIFICANT CHANGE UP

## 2018-11-25 PROCEDURE — 70486 CT MAXILLOFACIAL W/O DYE: CPT | Mod: 26

## 2018-11-25 PROCEDURE — 72170 X-RAY EXAM OF PELVIS: CPT | Mod: 26

## 2018-11-25 PROCEDURE — 70450 CT HEAD/BRAIN W/O DYE: CPT | Mod: 26

## 2018-11-25 PROCEDURE — 72125 CT NECK SPINE W/O DYE: CPT | Mod: 26

## 2018-11-25 PROCEDURE — 76377 3D RENDER W/INTRP POSTPROCES: CPT | Mod: 26

## 2018-11-25 PROCEDURE — 71045 X-RAY EXAM CHEST 1 VIEW: CPT | Mod: 26

## 2018-11-25 NOTE — ED ADULT NURSE NOTE - NSIMPLEMENTINTERV_GEN_ALL_ED
Implemented All Fall with Harm Risk Interventions:  [place] to call system. Call bell, personal items and telephone within reach. Instruct patient to call for assistance. Room bathroom lighting operational. Non-slip footwear when patient is off stretcher. Physically safe environment: no spills, clutter or unnecessary equipment. Stretcher in lowest position, wheels locked, appropriate side rails in place. Provide visual cue, wrist band, yellow gown, etc. Monitor gait and stability. Monitor for mental status changes and reorient to person, place, and time. Review medications for side effects contributing to fall risk. Reinforce activity limits and safety measures with patient and family. Provide visual clues: red socks.

## 2018-11-25 NOTE — ED PROVIDER NOTE - MEDICAL DECISION MAKING DETAILS
Account needs to be merged to MR# [id number], acct # [id number] MARCUS Completed ED provider note is sitting on that account, [place] is sitting on this account  MARCUS

## 2018-11-26 ENCOUNTER — MEDICATION RENEWAL (OUTPATIENT)
Age: 83
End: 2018-11-26

## 2018-11-26 VITALS
RESPIRATION RATE: 16 BRPM | HEART RATE: 71 BPM | DIASTOLIC BLOOD PRESSURE: 73 MMHG | SYSTOLIC BLOOD PRESSURE: 150 MMHG | TEMPERATURE: 98 F | OXYGEN SATURATION: 100 %

## 2018-11-26 PROCEDURE — 72170 X-RAY EXAM OF PELVIS: CPT

## 2018-11-26 PROCEDURE — 85730 THROMBOPLASTIN TIME PARTIAL: CPT

## 2018-11-26 PROCEDURE — 80053 COMPREHEN METABOLIC PANEL: CPT

## 2018-11-26 PROCEDURE — 86901 BLOOD TYPING SEROLOGIC RH(D): CPT

## 2018-11-26 PROCEDURE — 12002 RPR S/N/AX/GEN/TRNK2.6-7.5CM: CPT

## 2018-11-26 PROCEDURE — G0390: CPT

## 2018-11-26 PROCEDURE — 76377 3D RENDER W/INTRP POSTPROCES: CPT

## 2018-11-26 PROCEDURE — 86850 RBC ANTIBODY SCREEN: CPT

## 2018-11-26 PROCEDURE — 86900 BLOOD TYPING SEROLOGIC ABO: CPT

## 2018-11-26 PROCEDURE — 70486 CT MAXILLOFACIAL W/O DYE: CPT

## 2018-11-26 PROCEDURE — 84484 ASSAY OF TROPONIN QUANT: CPT

## 2018-11-26 PROCEDURE — 72125 CT NECK SPINE W/O DYE: CPT

## 2018-11-26 PROCEDURE — 83690 ASSAY OF LIPASE: CPT

## 2018-11-26 PROCEDURE — 99291 CRITICAL CARE FIRST HOUR: CPT | Mod: 25

## 2018-11-26 PROCEDURE — 71045 X-RAY EXAM CHEST 1 VIEW: CPT

## 2018-11-26 PROCEDURE — 85610 PROTHROMBIN TIME: CPT

## 2018-11-26 PROCEDURE — 85027 COMPLETE CBC AUTOMATED: CPT

## 2018-11-26 PROCEDURE — 70450 CT HEAD/BRAIN W/O DYE: CPT

## 2018-11-27 ENCOUNTER — APPOINTMENT (OUTPATIENT)
Age: 83
End: 2018-11-27

## 2018-11-27 VITALS
TEMPERATURE: 98.2 F | RESPIRATION RATE: 16 BRPM | OXYGEN SATURATION: 96 % | HEART RATE: 75 BPM | SYSTOLIC BLOOD PRESSURE: 130 MMHG | DIASTOLIC BLOOD PRESSURE: 66 MMHG

## 2018-12-06 ENCOUNTER — APPOINTMENT (OUTPATIENT)
Dept: HOME HEALTH SERVICES | Facility: HOME HEALTH | Age: 83
End: 2018-12-06
Payer: MEDICARE

## 2018-12-06 VITALS
HEIGHT: 58 IN | HEART RATE: 78 BPM | WEIGHT: 110 LBS | DIASTOLIC BLOOD PRESSURE: 70 MMHG | BODY MASS INDEX: 23.09 KG/M2 | TEMPERATURE: 98.1 F | RESPIRATION RATE: 17 BRPM | OXYGEN SATURATION: 97 % | SYSTOLIC BLOOD PRESSURE: 125 MMHG

## 2018-12-06 DIAGNOSIS — Z86.79 PERSONAL HISTORY OF OTHER DISEASES OF THE CIRCULATORY SYSTEM: ICD-10-CM

## 2018-12-06 DIAGNOSIS — Z92.29 PERSONAL HISTORY OF OTHER DRUG THERAPY: ICD-10-CM

## 2018-12-06 PROCEDURE — 99350 HOME/RES VST EST HIGH MDM 60: CPT

## 2019-01-03 ENCOUNTER — APPOINTMENT (OUTPATIENT)
Dept: PULMONOLOGY | Facility: CLINIC | Age: 84
End: 2019-01-03
Payer: MEDICARE

## 2019-01-03 VITALS
WEIGHT: 98 LBS | HEIGHT: 58 IN | HEART RATE: 68 BPM | OXYGEN SATURATION: 94 % | DIASTOLIC BLOOD PRESSURE: 70 MMHG | BODY MASS INDEX: 20.57 KG/M2 | RESPIRATION RATE: 17 BRPM | SYSTOLIC BLOOD PRESSURE: 130 MMHG

## 2019-01-03 PROCEDURE — 99215 OFFICE O/P EST HI 40 MIN: CPT | Mod: 25

## 2019-01-03 PROCEDURE — 71046 X-RAY EXAM CHEST 2 VIEWS: CPT

## 2019-01-03 NOTE — ADDENDUM
[FreeTextEntry1] : All medical record entries made by yuri Castrejon were at Dr. Edwin Briones's, direction and personally dictated by me on 01/03/2019. I have reviewed the chart and agree that the record accurately reflects my personal performance of the history, physical exam, assessment and plan. I have also personally directed, reviewed, and agree with the discharge instructions.

## 2019-01-03 NOTE — HISTORY OF PRESENT ILLNESS
[FreeTextEntry1] : Ms. Cruz is a 88 year old female with a history of moderate persistent RAD, vitamin D deficiency  presenting to the office today for a follow up visit. Her chief complaint is difficulty breathing. \par -in December 2017 she fell and was in the hospital / rehab for a while and placed on levalbuterol and budesonide but taken off of Spiriva \par -she is occasionally constipated. she takes a water pill\par -her weight has been stable\par -she has been sleeping a lot throughout the night and during the day, but not snoring\par -she has no longer been using her supplemental oxygen \par -she has not been bringing up much mucus, but will have a very occasional cough \par -she denies any headaches, nausea, vomiting, fever, chills, sweats, chest pain, chest pressure, diarrhea, dysphagia, dizziness, leg swelling, leg pain, itchy eyes, itchy ears, heartburn, reflux, or sour taste in the mouth.

## 2019-01-03 NOTE — PROCEDURE
[FreeTextEntry1] : CXR reveals mild cardiomegaly ; no evidence of infiltrate or effusion--scoliosis to the right.

## 2019-01-03 NOTE — PHYSICAL EXAM

## 2019-01-03 NOTE — REASON FOR VISIT
[Follow-Up] : a follow-up visit [Formal Caregiver] : formal caregiver [Family Member] : family member [FreeTextEntry1] : moderate persistent RAD, vitamin D deficiency

## 2019-01-03 NOTE — ASSESSMENT
[FreeTextEntry1] : Ms. Cruz is a 8 year old female with a history of asthma / COPD / CHF / ?OSAS - She is currently stable from a pulmonary perspective. \par \par Problem 1: Chronic bronchitis \par -Acapella device \par You have a clinical scenario most c/w acute bronchitis the etiology of which is unknown but empiric antibiotics are indicated. Hydration, mucolytics including Mucinex, Robitussin and the like are indicated. Cough controlling agents will be needed. \par \par Problem 2: mild asthma\par -continue Nebulizer with Xopenex 1.25 BID (first)\par -continue Pulmicort via nebulizer 0.5 BID (second)\par -continue Singulair 10 mg before bed \par **Xopenex followed by Pulmicort**\par \par -Asthma is  believed to be caused by inherited (genetic) and environmental factor, but its exact cause is unknown. Asthma may be triggered by allergens, lung infections, or irritants in the air. Asthma triggers are different for each person\par -Inhaler technique reviewed as well as oral hygiene techniques reviewed with patient. Avoidance of cold air, extremes of temperature, rescue inhaler should be used before exercise. Order of medication reviewed with patient. Recommended use of a cool mist humidifier in the bedroom. \par \par Problem 3: atelectasis\par -encouraged her to work on her breathing techniques and to sit up more \par \par Problem 4: Poor mechanics of breathing \par -Proper breathing techniques were reviewed with an emphasis of exhalation. Patient instructed to breath in for 1 second and out for four seconds. Patient was encouraged to not talk while walking. \par \par Problem 5: Pleural effusion, resolved \par -Gone at this time as per her recent CT scan \par \par Problem 6: Low vitamin D\par - Has been associated with asthma exacerbations and increased allergic symptoms. The goal based on recent information is maintaining levels between 50-70 and low normal is 30. Recommended 50,000 units every two weeks to once a month depending on the level.\par \par Problem 7: ? OSAS\par -Home sleep study due to EDS snoring, elevated MP and fatigue\par -Discussed the risks/associations with coronary artery disease, atrial fibrillation, arrhythmia, memory loss, issues with concentration, stroke risk, hypertension, nocturia, chronic reflux/Jimenez’s esophagus some but not all inclusive. Treatment options discussed including CPAP/BiPAP machine, oral appliance, ProVent therapy, Oxy-Aid by [employer]pite[employer], new technologies, or positional sleep.\par \par problem 8: health maintenance \par -recommended yearly flu shot - 2018\par -recommended strep pneumonia vaccines: Prevnar-13 vaccine, followed by Pneumo vaccine 23 one year following\par -recommended early intervention for Upper Respiratory Infections (URIs)\par -recommended regular osteoporosis evaluations\par -recommended early dermatological evaluations\par -recommended after the age of 50 to the age of 70, colonoscopy every 5 years \par \par F/u in 3-4 months \par pt is encouraged to call or fax the office with any questions or concerns. \par Pt is to exercise and diet to promote a healthy weight. \par Explained to the pt in full detail with demonstrations how to use the inhalers a

## 2019-01-04 ENCOUNTER — APPOINTMENT (OUTPATIENT)
Dept: HOME HEALTH SERVICES | Facility: HOME HEALTH | Age: 84
End: 2019-01-04
Payer: MEDICARE

## 2019-01-04 VITALS
TEMPERATURE: 97.3 F | DIASTOLIC BLOOD PRESSURE: 70 MMHG | SYSTOLIC BLOOD PRESSURE: 125 MMHG | RESPIRATION RATE: 16 BRPM | OXYGEN SATURATION: 95 % | HEART RATE: 72 BPM

## 2019-01-04 DIAGNOSIS — J90 PLEURAL EFFUSION, NOT ELSEWHERE CLASSIFIED: ICD-10-CM

## 2019-01-04 DIAGNOSIS — Z87.898 PERSONAL HISTORY OF OTHER SPECIFIED CONDITIONS: ICD-10-CM
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
## 2019-01-04 DIAGNOSIS — J45.40 MODERATE PERSISTENT ASTHMA, UNCOMPLICATED: ICD-10-CM
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
## 2019-01-04 PROCEDURE — 99350 HOME/RES VST EST HIGH MDM 60: CPT

## 2019-01-04 NOTE — REASON FOR VISIT
[Post ER] : a Post ER visit [Formal Caregiver] : formal caregiver [Pre-Visit Preparation] : pre-visit preparation was done [Intercurrent Specialty/Sub-specialty Visits] : the patient has intercurrent specialty/sub-specialty visits [FreeTextEntry3] : RN CM

## 2019-01-04 NOTE — CURRENT MEDS
[Medication and Allergies Reconciled] : medication and allergies reconciled [High Risk Medications Reviewed and Reconciled (Beers Criteria)] : high risk medications reviewed and reconciled [Reviewed patient reported medication adherence from Comprehensive Assessment] : Reviewed patient reported medication adherence from comprehensive assessment [Compliant with medications] : Patient is compliant with medications

## 2019-01-04 NOTE — COUNSELING
[Normal Weight (BMI <25)] : normal weight - BMI <25 [Weight counseling provided] : Weight counseling provided [Mediterranean diet recommended] : Mediterranean diet recommended [Sodium restriction 2gm recommended] : Sodium restriction 2 gm recommended [Medical/Nutritional supplementation as prescribed] : medical/nutritional supplementation as prescribed [Non - Smoker] : non-smoker [Medical alert] : medical alert [Use assistive device to avoid falls] : use assistive device to avoid falls [Remove clutter and unsafe carpeting to avoid falls] : remove clutter and unsafe carpeting to avoid falls [Use grab bars] : use grab bars [Smoke/CO Detectors] : smoke/CO detectors [Vaccinations: _______] : Vaccinations: [unfilled] [Not Indicated] : not indicated [Improve mobility] : improve mobility [Decrease stress] : decrease stress [Decrease hospital use] : decrease hospital use [Minimize unnecessary interventions] : minimize unnecessary interventions [Maintain functional ability] : maintain functional ability [Discussed disease trajectory with patient/caregiver] : discussed disease trajectory with patient/caregiver [Likely to achieve goals/desired outcomes] : likely to achieve goals/desired outcomes [Patient/Caregiver has ___ understanding of disease process] : patient/caregiver has [unfilled] understanding of disease process [Completed Healthcare Proxy] : completed healthcare proxy [Completed DNR] : completed DNR [Completed Medical Orders for Life-Sustaining Treatment] : completed medical orders for life-sustaining treatment [DNR] : Code Status: DNR [Limited] : Treatment Guidelines: Limited [DNI] : Intubation: DNI [[facility]: _____[facility]: [unfilled] [_____] : HCP: [unfilled] [FreeTextEntry3] : .

## 2019-01-04 NOTE — HISTORY OF PRESENT ILLNESS
[Patient] : patient [Family Member] : family member [Formal Caregiver] : formal caregiver [FreeTextEntry1] : dementia, recurrent falls [FreeTextEntry2] : This is an 88-year-old female with a past medical history of dementia status post CVA with no residual effects, Chronic kidney disease stage IV, Reactive airway disease on inhalers, Dependent on ADLs with recurrent falls, s/p ER 11/2018 for head injury with negative CT head seen for follow-up today. \par interval events- went to see pulmonologist with no new recommendations.\par \par Patient has been feeling ok with no new complaints. still walking slowly, however ambulating without problems with walker and personal assistance, no other falls.\par denies fever, chills, nausea, vomiting, any urinary complaints.\par eating and drinking fine, still has dysphagia, daughter has not gotten thickeners as yet\par no h/o aspiration pneumonia\par cutting foods to smaller size\par Weight has been stable \par has been experiencing constipation, going every 3-4 days, hard stools, last one today. \par supposed to be on Miralax which they ran out\par incontinence to bladder and stool, wears diaper.\par denies pressure/bed sores. h/o in the past. , uses barrier crm/A&D,\par  skin, easy bruising especially on lower legs, \par Ambulates with assist of walker\par memory- progressive dementia, not oriented to time and date\par Mood is ok,  Sleeping well at night, and almost whole day\par -----------\par lives with A 24/7\par daughetr Rachel pre-pours meds

## 2019-01-04 NOTE — LETTER HEADER
[Care Plan reviewed and provided to patient/caregiver] : Care plan reviewed and provided to patient/caregiver [Care Plan reviewed every ___ weeks] : Care plan reviewed every [unfilled] weeks [Care Plan managed/Care coordinated by: ___] : Care plan managed/Care coordinated by: [unfilled] [Initiation or substantial revisions made to care plan involving mod/high medical decision making for complex CCM] : Initiation or substantial revisions made to care plan involving mod/high medical decision making for complex CCM [Patient/Caregiver agrees to have other providers send summary of their care to this office] : Patient/caregiver agrees to have other providers send summary of their care to this office

## 2019-01-04 NOTE — PHYSICAL EXAM
[No Acute Distress] : no acute distress [PERRL] : pupils equal, round and reactive to light [EOMI] : extra ocular movement intact [Normal Nasal Mucosa] : the nasal mucosa was normal [No JVD] : no jugular venous distention [Supple] : the neck was supple [No Respiratory Distress] : no respiratory distress [Clear to Auscultation] : lungs were clear to auscultation bilaterally [No Accessory Muscle Use] : no accessory muscle use [Normal Rate] : heart rate was normal  [Normal S1, S2] : normal S1 and S2 [No Edema] : there was no peripheral edema [Normal Bowel Sounds] : normal bowel sounds [Non Tender] : non-tender [Soft] : abdomen soft [Not Distended] : not distended [No Spinal Tenderness] : no spinal tenderness [Kyphosis] :  kyphosis present [No Clubbing, Cyanosis] : no clubbing  or cyanosis of the fingernails [No Rash] : no rash [No Skin Lesions] : no skin lesions [No Motor Deficits] : the motor exam was normal [No Gross Sensory Deficits] : no gross sensory deficits [Normal Affect] : the affect was normal [de-identified] : calm, interactive, weak with visible muscle mass loss [de-identified] : poor dentition [de-identified] : 2/6 systolic murmur, irregular rhythm [de-identified] : full [de-identified] : arthritic joints, walks with walker, no deformity or shortening of legs [de-identified] :  scattered bruising lower legs [de-identified] : A&OX2 pleasant

## 2019-01-08 ENCOUNTER — MEDICATION RENEWAL (OUTPATIENT)
Age: 84
End: 2019-01-08

## 2019-01-17 ENCOUNTER — MEDICATION RENEWAL (OUTPATIENT)
Age: 84
End: 2019-01-17

## 2019-03-19 ENCOUNTER — EMERGENCY (EMERGENCY)
Facility: HOSPITAL | Age: 84
LOS: 1 days | Discharge: HOME | End: 2019-03-19
Attending: EMERGENCY MEDICINE
Payer: MEDICARE

## 2019-03-19 ENCOUNTER — TRANSCRIPTION ENCOUNTER (OUTPATIENT)
Age: 84
End: 2019-03-19

## 2019-03-19 ENCOUNTER — CLINICAL ADVICE (OUTPATIENT)
Age: 84
End: 2019-03-19

## 2019-03-19 VITALS
RESPIRATION RATE: 20 BRPM | TEMPERATURE: 98 F | WEIGHT: 95.02 LBS | SYSTOLIC BLOOD PRESSURE: 150 MMHG | DIASTOLIC BLOOD PRESSURE: 55 MMHG | HEART RATE: 67 BPM | OXYGEN SATURATION: 98 %

## 2019-03-19 PROCEDURE — 99284 EMERGENCY DEPT VISIT MOD MDM: CPT | Mod: 57,GC,25

## 2019-03-19 PROCEDURE — 23650 CLTX SHO DSLC W/MNPJ WO ANES: CPT | Mod: 54

## 2019-03-19 NOTE — ED ADULT TRIAGE NOTE - CHIEF COMPLAINT QUOTE
c/o right shoulder pain, noticed this morning while in bed. Denies any fall or traumatic injury to right shoulder as per family and aide at bedside.

## 2019-03-19 NOTE — ED CLERICAL - NS ED CLERK NOTE PRE-ARRIVAL INFORMATION; ADDITIONAL PRE-ARRIVAL INFORMATION

## 2019-03-20 VITALS
TEMPERATURE: 98 F | RESPIRATION RATE: 17 BRPM | SYSTOLIC BLOOD PRESSURE: 161 MMHG | HEART RATE: 66 BPM | DIASTOLIC BLOOD PRESSURE: 64 MMHG | OXYGEN SATURATION: 99 %

## 2019-03-20 PROCEDURE — 73030 X-RAY EXAM OF SHOULDER: CPT | Mod: 26,RT

## 2019-03-20 PROCEDURE — 96374 THER/PROPH/DIAG INJ IV PUSH: CPT | Mod: XU

## 2019-03-20 PROCEDURE — 73110 X-RAY EXAM OF WRIST: CPT

## 2019-03-20 PROCEDURE — 73020 X-RAY EXAM OF SHOULDER: CPT

## 2019-03-20 PROCEDURE — 96375 TX/PRO/DX INJ NEW DRUG ADDON: CPT | Mod: XU

## 2019-03-20 PROCEDURE — 99284 EMERGENCY DEPT VISIT MOD MDM: CPT | Mod: 25

## 2019-03-20 PROCEDURE — 73030 X-RAY EXAM OF SHOULDER: CPT | Mod: 26,RT,77

## 2019-03-20 PROCEDURE — 23650 CLTX SHO DSLC W/MNPJ WO ANES: CPT | Mod: RT

## 2019-03-20 PROCEDURE — 73110 X-RAY EXAM OF WRIST: CPT | Mod: 26,RT

## 2019-03-20 PROCEDURE — 73030 X-RAY EXAM OF SHOULDER: CPT

## 2019-03-20 PROCEDURE — 96376 TX/PRO/DX INJ SAME DRUG ADON: CPT | Mod: XU

## 2019-03-20 RX ORDER — MORPHINE SULFATE 50 MG/1
4 CAPSULE, EXTENDED RELEASE ORAL ONCE
Qty: 0 | Refills: 0 | Status: DISCONTINUED | OUTPATIENT
Start: 2019-03-20 | End: 2019-03-20

## 2019-03-20 RX ORDER — FENTANYL CITRATE 50 UG/ML
50 INJECTION INTRAVENOUS ONCE
Qty: 0 | Refills: 0 | Status: DISCONTINUED | OUTPATIENT
Start: 2019-03-20 | End: 2019-03-20

## 2019-03-20 RX ORDER — FENTANYL CITRATE 50 UG/ML
25 INJECTION INTRAVENOUS ONCE
Qty: 0 | Refills: 0 | Status: DISCONTINUED | OUTPATIENT
Start: 2019-03-20 | End: 2019-03-20

## 2019-03-20 RX ORDER — SODIUM CHLORIDE 9 MG/ML
500 INJECTION INTRAMUSCULAR; INTRAVENOUS; SUBCUTANEOUS ONCE
Qty: 0 | Refills: 0 | Status: COMPLETED | OUTPATIENT
Start: 2019-03-20 | End: 2019-03-20

## 2019-03-20 RX ADMIN — FENTANYL CITRATE 25 MICROGRAM(S): 50 INJECTION INTRAVENOUS at 11:25

## 2019-03-20 RX ADMIN — SODIUM CHLORIDE 500 MILLILITER(S): 9 INJECTION INTRAMUSCULAR; INTRAVENOUS; SUBCUTANEOUS at 06:54

## 2019-03-20 RX ADMIN — FENTANYL CITRATE 50 MICROGRAM(S): 50 INJECTION INTRAVENOUS at 10:09

## 2019-03-20 RX ADMIN — MORPHINE SULFATE 4 MILLIGRAM(S): 50 CAPSULE, EXTENDED RELEASE ORAL at 06:18

## 2019-03-20 RX ADMIN — MORPHINE SULFATE 4 MILLIGRAM(S): 50 CAPSULE, EXTENDED RELEASE ORAL at 07:10

## 2019-03-20 NOTE — ED PROVIDER NOTE - CLINICAL SUMMARY MEDICAL DECISION MAKING FREE TEXT BOX
88F with R shoulder pain found to have dislocation of unknown chronicity, noted to have pain yesterday morning. Reduced with help of ortho and placed in sling. To follow outpatient.

## 2019-03-20 NOTE — ED PROVIDER NOTE - OBJECTIVE STATEMENT
89y/o F h/o Dementia, Afib on eliquis, prior CVA, HTN, presenting to ED after aide states patient was having some pain while trying to dress her, and called house calls; had home xray showed shoulder dislocation.  fall 2 weeks ago but no apparent problems with shoulder at that time.  at baseline walks very little with walker or assistance.

## 2019-03-20 NOTE — ED ADULT NURSE REASSESSMENT NOTE - NS ED NURSE REASSESS COMMENT FT1
MD ordered 50mcg Fentanyl IV push prior to placement of right shoulder. VSS. Patient is on cardiac monitor has fluids running in L ac. Plan of care discussed with patient and pt. daughter. Pt. will have repeat xray to confirm placement after procedure.
Report received from change of shift RN Ree. Patient resting comfortably waiting for repeat xray to confirm placement of shoulder. Bedside xray reviewed and Dr. Bassett explained shoulder was in place but popped back out of place and plan is to try to set shoulder once more and consult orthopedics. Family at bedside verbalizes understanding. Comfort and safety provided.
Right shoulder is in place as per xray confirmation.
Ortho resident and Dr. Russell at bedside attempting to place dislocated right shoulder. Xray in room for post reduction confirmation. Patient has been medicated with fentanyl per MD orders and VSS.

## 2019-03-20 NOTE — ED PROVIDER NOTE - PROGRESS NOTE DETAILS
Attending MD Bassett: received in s/o from Dr. Schroeder s/p shoulder dislocation reduction, shoulder dislocated again spontaneously. Attempted closed reduction at bedside, shoulder grossly unstable and reduces and dislocates immediately. Plan for ortho consult for assistance in management D/w ortho PA will return with resident, would like to try conscious sedation. Daughter Rachel Flores consented, can be reached at [phone] or via her  Speedy at [phone] Reduced with ortho. Daughter, son in law did not , left message. Discussed with aide d/c instructions, f/u with ortho, Keysha Reduced with ortho. Did not require conscious sedation. Daughter, son in law did not , left message. Discussed with aide d/c instructions, f/u with ortho, Keysha

## 2019-03-20 NOTE — ED PROCEDURE NOTE - ATTENDING CONTRIBUTION TO CARE
I have participated in and supervised all key portions of the above procedures and agree with the above documentation. MAEGAN Schroeder MD

## 2019-03-20 NOTE — ED PROVIDER NOTE - CARE PROVIDER_API CALL
Pepito Quezada)  Orthopaedic Surgery  [address] [place] [address], [address]  [place] [address]  Phone: [phone]  Fax: [phone]  Follow Up Time: 1-3 Days

## 2019-03-20 NOTE — ED ADULT NURSE NOTE - NSIMPLEMENTINTERV_GEN_ALL_ED
Implemented All Fall with Harm Risk Interventions:  [place] to call system. Call bell, personal items and telephone within reach. Instruct patient to call for assistance. Room bathroom lighting operational. Non-slip footwear when patient is off stretcher. Physically safe environment: no spills, clutter or unnecessary equipment. Stretcher in lowest position, wheels locked, appropriate side rails in place. Provide visual cue, wrist band, yellow gown, etc. Monitor gait and stability. Monitor for mental status changes and reorient to person, place, and time. Review medications for side effects contributing to fall risk. Reinforce activity limits and safety measures with patient and family. Provide visual clues: red socks.

## 2019-03-20 NOTE — ED PROVIDER NOTE - ATTENDING CONTRIBUTION TO CARE
87y/o F h/o Dementia, Afib on eliquis, prior CVA, HTN, presenting to ED after aide states patient was having some pain while trying to dress her, and called house calls; had home xray showed shoulder dislocation.  fall 2 weeks ago but no apparent problems with shoulder at that time.    On exam, noted to have R shoulder deformity.      Concern for R shoulder dislocation; will obtain x-rays, and reduce if needed.    ED Course:  Shoulder initially reduced with local anesthetic and parenteral narcotic; however, appears to have spontaneously dislocated again; patient signed out to oncoming physician: re-attempt reduction, consider ortho consult.

## 2019-03-20 NOTE — ED ADULT NURSE NOTE - OBJECTIVE STATEMENT
87 yo F w/ PMHx of afib, CVA, dementia, epilepsy, HTN presents to ED c/o R shoulder pain. Pt A&Ox1 at baseline per daughter and aide at bedside, knows name only. Daughter reports today pt woke in the afternoon and began c/o R shoulder pain when touched. They called home health agency who sent a portable home xray and pt was reportedly dx w/ R shoulder dislocation. Pt has had no known recent falls or trauma. No obvious deformity noted to R shoulder, pt c/o pain when palpated. Pt denies pain at rest. Pt denies any CP, SOB, N/V, fever, chills, urinary complaints, constipation, diarrhea, HA, dizziness, weakness. Pt A&Ox4, lungs CTA, +central pulses. Abdomen soft, not tender, not distended. Ambulating w/ steady gait, safety and comfort maintained, no acute distress noted at this time.

## 2019-03-20 NOTE — ED PROVIDER NOTE - SHIFT CHANGE DETAILS
I have endorsed this patient to the incoming physician, including clinical history, physical exam, current results and assessment/plan. Pt with R anterior shoulder dislocation; reduced with local anesthetic and parenteral narcotic; however, appears to have spontaneously dislocated again; re-attempt reduction, consider ortho consult.

## 2019-03-20 NOTE — CONSULT NOTE ADULT - SUBJECTIVE AND OBJECTIVE BOX
88y Female RHD, hx dementia, Afib on eliquis, prior CVA, HTN, presents to ED after aide noticed shoulder looked "funny" yesterday.  Aide states that pt had a fall 2 weeks ago, but they did not notice any problems with that shoulder at the time.  Denies fever/chills.  Pt demented, occasionally walks with walker at baseline.  hx obtained from aide/ED.   R shoulder was attempted 2x overnight by ED to reduce, they stated they thought they were able to reduce shoulder but would not stay in.      HEALTH ISSUES - PROBLEM Dx:  dementia  afib  CVA  HTN    MEDICATIONS  (STANDING):    Allergies    No Known Allergies    Intolerances    Imaging: XR demonstated R/shoulder GHJ dislocation, anterior    Vital Signs Last 24 Hrs  T(C): 36.5 (03-20-19 @ 02:51), Max: 36.7 (03-19-19 @ 21:25)  T(F): 97.7 (03-20-19 @ 02:51), Max: 98.1 (03-19-19 @ 21:25)  HR: 72 (03-20-19 @ 11:12) (61 - 72)  BP: 145/74 (03-20-19 @ 11:12) (135/49 - 167/67)  BP(mean): --  RR: 16 (03-20-19 @ 11:12) (15 - 20)  SpO2: 97% (03-20-19 @ 11:12) (96% - 99%)    Gen: NAD  R UE: Skin intact, +anterior shoulder fullness, +sulcus sign, unable to range shoulder 2/2 pain, unable to examine motor/sensory 2/2 condition, radial pulse intact, compartments soft, brisk cap refill     Procedure: 10cc 1% lidocaine/20cc NS injected under sterile procedure into R shoulder joint. Closed reduction performed. Post procedure imaging demonstrates located shoulder joint. Post procedure exam unchanged.     A/P: 88y Female w R shoulder dislocation sp closed reduction    Pain control  NWB R UE in sling with waist strap, keep c/d/i  Ice  Active movement of fingers/wrist/elbow encouraged  Possible need for surgical intervention in future  Follow up with Dr. Quezada within 1 week, call office for appointment  Ortho stable for DC 88y Female RHD, hx dementia, Afib on eliquis, prior CVA, HTN, presents to ED after aide noticed shoulder looked "funny" yesterday.  Aide states that pt had a fall 2 weeks ago, but they did not notice any problems with that shoulder at the time.  Denies fever/chills.  Pt demented, occasionally walks with walker at baseline.  hx obtained from aide/ED.   R shoulder was attempted 2x overnight by ED to reduce, they stated they thought they were able to reduce shoulder but would not stay in.      HEALTH ISSUES - PROBLEM Dx:  dementia  afib  CVA  HTN    MEDICATIONS  (STANDING):    Allergies    No Known Allergies    Intolerances    Imaging: XR demonstated R shoulder GHJ dislocation, anterior  XR R wrist show no obvious fx/dislocation,  fu official read    Vital Signs Last 24 Hrs  T(C): 36.5 (03-20-19 @ 02:51), Max: 36.7 (03-19-19 @ 21:25)  T(F): 97.7 (03-20-19 @ 02:51), Max: 98.1 (03-19-19 @ 21:25)  HR: 72 (03-20-19 @ 11:12) (61 - 72)  BP: 145/74 (03-20-19 @ 11:12) (135/49 - 167/67)  BP(mean): --  RR: 16 (03-20-19 @ 11:12) (15 - 20)  SpO2: 97% (03-20-19 @ 11:12) (96% - 99%)    Gen: NAD  R UE: Skin intact, +anterior shoulder fullness, +sulcus sign, unable to range shoulder 2/2 pain, unable to examine motor/sensory 2/2 condition, radial pulse intact, compartments soft, brisk cap refill   Secondary: +instability at R wrist at druj, no apparent pain.  No ttp bony prominences b/l LE/LUE, pulses palpable, motor/sensory unable to examine    Procedure: 10cc 1% lidocaine/20cc NS injected under sterile procedure into R shoulder joint. Closed reduction performed. Post procedure imaging, including axillary view, demonstrates located shoulder joint. Post procedure exam unchanged.     A/P: 88y Female w R shoulder dislocation sp closed reduction    Pain control  NWB R UE in sling with waist strap, keep c/d/i  Ice  Active movement of fingers/wrist/elbow encouraged  Possible need for surgical intervention in future  Follow up with Dr. Quezada within 1 week, call office for appointment  Ortho stable for DC

## 2019-03-20 NOTE — ED PROCEDURE NOTE - PROCEDURE ADDITIONAL DETAILS
patient given intra-articular injection of 10cc 1%lidocaine via landmarks using syringe and 18g needle.  reduction of right anterior shoulder dislocatin accomplished with traction / counter traction, appeared initialyl successful, then dislocated again spontaneously, then reduced again (attempt #2) and appeared to stay in; arm placed in sling.

## 2019-03-21 ENCOUNTER — TRANSCRIPTION ENCOUNTER (OUTPATIENT)
Age: 84
End: 2019-03-21

## 2019-03-22 ENCOUNTER — APPOINTMENT (OUTPATIENT)
Dept: HOME HEALTH SERVICES | Facility: HOME HEALTH | Age: 84
End: 2019-03-22

## 2019-03-22 VITALS
OXYGEN SATURATION: 95 % | SYSTOLIC BLOOD PRESSURE: 118 MMHG | RESPIRATION RATE: 16 BRPM | DIASTOLIC BLOOD PRESSURE: 70 MMHG | HEART RATE: 64 BPM | TEMPERATURE: 97.1 F

## 2019-03-27 ENCOUNTER — APPOINTMENT (OUTPATIENT)
Dept: HOME HEALTH SERVICES | Facility: HOME HEALTH | Age: 84
End: 2019-03-27
Payer: MEDICARE

## 2019-03-27 DIAGNOSIS — T14.8XXA OTHER INJURY OF UNSPECIFIED BODY REGION, INITIAL ENCOUNTER: ICD-10-CM

## 2019-03-27 PROCEDURE — 99349 HOME/RES VST EST MOD MDM 40: CPT

## 2019-03-30 PROBLEM — T14.8XXA: Status: ACTIVE | Noted: 2019-03-27

## 2019-05-07 ENCOUNTER — RX RENEWAL (OUTPATIENT)
Age: 84
End: 2019-05-07

## 2019-05-08 ENCOUNTER — TRANSCRIPTION ENCOUNTER (OUTPATIENT)
Age: 84
End: 2019-05-08

## 2019-05-08 ENCOUNTER — CLINICAL ADVICE (OUTPATIENT)
Age: 84
End: 2019-05-08

## 2019-05-09 ENCOUNTER — TRANSCRIPTION ENCOUNTER (OUTPATIENT)
Age: 84
End: 2019-05-09

## 2019-05-09 ENCOUNTER — APPOINTMENT (OUTPATIENT)
Age: 84
End: 2019-05-09

## 2019-05-09 VITALS
RESPIRATION RATE: 17 BRPM | SYSTOLIC BLOOD PRESSURE: 120 MMHG | OXYGEN SATURATION: 97 % | HEART RATE: 60 BPM | DIASTOLIC BLOOD PRESSURE: 70 MMHG | TEMPERATURE: 97.6 F

## 2019-05-17 ENCOUNTER — APPOINTMENT (OUTPATIENT)
Dept: HOME HEALTH SERVICES | Facility: HOME HEALTH | Age: 84
End: 2019-05-17

## 2019-05-30 ENCOUNTER — APPOINTMENT (OUTPATIENT)
Dept: HOME HEALTH SERVICES | Facility: HOME HEALTH | Age: 84
End: 2019-05-30

## 2019-06-02 ENCOUNTER — TRANSCRIPTION ENCOUNTER (OUTPATIENT)
Age: 84
End: 2019-06-02

## 2019-06-03 ENCOUNTER — APPOINTMENT (OUTPATIENT)
Age: 84
End: 2019-06-03

## 2019-06-20 ENCOUNTER — APPOINTMENT (OUTPATIENT)
Dept: HOME HEALTH SERVICES | Facility: HOME HEALTH | Age: 84
End: 2019-06-20
Payer: MEDICARE

## 2019-06-20 DIAGNOSIS — G40.909 EPILEPSY, UNSPECIFIED, NOT INTRACTABLE, W/OUT STATUS EPILEPTICUS: ICD-10-CM

## 2019-06-20 DIAGNOSIS — Z91.81 HISTORY OF FALLING: ICD-10-CM

## 2019-06-20 PROCEDURE — 99350 HOME/RES VST EST HIGH MDM 60: CPT

## 2019-06-21 VITALS
DIASTOLIC BLOOD PRESSURE: 80 MMHG | HEART RATE: 69 BPM | OXYGEN SATURATION: 94 % | SYSTOLIC BLOOD PRESSURE: 120 MMHG | RESPIRATION RATE: 14 BRPM | TEMPERATURE: 97.5 F

## 2019-06-23 PROBLEM — G40.909 SEIZURE DISORDER: Status: ACTIVE | Noted: 2017-05-10

## 2019-06-27 ENCOUNTER — TRANSCRIPTION ENCOUNTER (OUTPATIENT)
Age: 84
End: 2019-06-27

## 2019-07-02 ENCOUNTER — TRANSCRIPTION ENCOUNTER (OUTPATIENT)
Age: 84
End: 2019-07-02

## 2019-07-02 ENCOUNTER — INPATIENT (INPATIENT)
Facility: HOSPITAL | Age: 84
LOS: 5 days | Discharge: HOME | DRG: 291 | End: 2019-07-08
Attending: STUDENT IN AN ORGANIZED HEALTH CARE EDUCATION/TRAINING PROGRAM | Admitting: HOSPITALIST
Payer: MEDICARE

## 2019-07-02 VITALS
DIASTOLIC BLOOD PRESSURE: 68 MMHG | HEART RATE: 72 BPM | RESPIRATION RATE: 20 BRPM | SYSTOLIC BLOOD PRESSURE: 135 MMHG | OXYGEN SATURATION: 98 %

## 2019-07-02 DIAGNOSIS — I10 ESSENTIAL (PRIMARY) HYPERTENSION: ICD-10-CM

## 2019-07-02 DIAGNOSIS — N17.9 ACUTE KIDNEY FAILURE, UNSPECIFIED: ICD-10-CM

## 2019-07-02 DIAGNOSIS — Z29.9 ENCOUNTER FOR PROPHYLACTIC MEASURES, UNSPECIFIED: ICD-10-CM

## 2019-07-02 DIAGNOSIS — J96.91 RESPIRATORY FAILURE, UNSPECIFIED WITH HYPOXIA: ICD-10-CM

## 2019-07-02 DIAGNOSIS — I50.9 HEART FAILURE, UNSPECIFIED: ICD-10-CM

## 2019-07-02 DIAGNOSIS — G40.909 EPILEPSY, UNSPECIFIED, NOT INTRACTABLE, WITHOUT STATUS EPILEPTICUS: ICD-10-CM

## 2019-07-02 DIAGNOSIS — F03.90 UNSPECIFIED DEMENTIA WITHOUT BEHAVIORAL DISTURBANCE: ICD-10-CM

## 2019-07-02 DIAGNOSIS — J44.9 CHRONIC OBSTRUCTIVE PULMONARY DISEASE, UNSPECIFIED: ICD-10-CM

## 2019-07-02 DIAGNOSIS — I48.91 UNSPECIFIED ATRIAL FIBRILLATION: ICD-10-CM
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
## 2019-07-02 DIAGNOSIS — E87.5 HYPERKALEMIA: ICD-10-CM

## 2019-07-02 LAB
ALBUMIN SERPL ELPH-MCNC: 4 G/DL — SIGNIFICANT CHANGE UP (ref 3.3–5)
ALP SERPL-CCNC: 109 U/L — SIGNIFICANT CHANGE UP (ref 40–120)
ALT FLD-CCNC: 39 U/L — SIGNIFICANT CHANGE UP (ref 10–45)
ANION GAP SERPL CALC-SCNC: 13 MMOL/L — SIGNIFICANT CHANGE UP (ref 5–17)
ANION GAP SERPL CALC-SCNC: 17 MMOL/L — SIGNIFICANT CHANGE UP (ref 5–17)
APPEARANCE UR: CLEAR — SIGNIFICANT CHANGE UP
APTT BLD: 37.3 SEC — HIGH (ref 27.5–36.3)
AST SERPL-CCNC: 42 U/L — HIGH (ref 10–40)
BACTERIA # UR AUTO: ABNORMAL
BASE EXCESS BLDV CALC-SCNC: 0.4 MMOL/L — SIGNIFICANT CHANGE UP (ref -2–2)
BILIRUB DIRECT SERPL-MCNC: 0.1 MG/DL — SIGNIFICANT CHANGE UP (ref 0–0.2)
BILIRUB SERPL-MCNC: 0.5 MG/DL — SIGNIFICANT CHANGE UP (ref 0.2–1.2)
BILIRUB UR-MCNC: NEGATIVE — SIGNIFICANT CHANGE UP
BUN SERPL-MCNC: 51 MG/DL — HIGH (ref 7–23)
BUN SERPL-MCNC: 52 MG/DL — HIGH (ref 7–23)
CA-I SERPL-SCNC: 1.25 MMOL/L — SIGNIFICANT CHANGE UP (ref 1.12–1.3)
CALCIUM SERPL-MCNC: 9.2 MG/DL — SIGNIFICANT CHANGE UP (ref 8.4–10.5)
CALCIUM SERPL-MCNC: 9.6 MG/DL — SIGNIFICANT CHANGE UP (ref 8.4–10.5)
CHLORIDE BLDV-SCNC: 107 MMOL/L — SIGNIFICANT CHANGE UP (ref 96–108)
CHLORIDE SERPL-SCNC: 104 MMOL/L — SIGNIFICANT CHANGE UP (ref 96–108)
CHLORIDE SERPL-SCNC: 106 MMOL/L — SIGNIFICANT CHANGE UP (ref 96–108)
CO2 BLDV-SCNC: 27 MMOL/L — SIGNIFICANT CHANGE UP (ref 22–30)
CO2 SERPL-SCNC: 23 MMOL/L — SIGNIFICANT CHANGE UP (ref 22–31)
CO2 SERPL-SCNC: 24 MMOL/L — SIGNIFICANT CHANGE UP (ref 22–31)
COLOR SPEC: SIGNIFICANT CHANGE UP
CREAT SERPL-MCNC: 1.99 MG/DL — HIGH (ref 0.5–1.3)
CREAT SERPL-MCNC: 2.04 MG/DL — HIGH (ref 0.5–1.3)
DIFF PNL FLD: NEGATIVE — SIGNIFICANT CHANGE UP
EPI CELLS # UR: 5 /HPF — SIGNIFICANT CHANGE UP
GAS PNL BLDV: 141 MMOL/L — SIGNIFICANT CHANGE UP (ref 135–145)
GAS PNL BLDV: SIGNIFICANT CHANGE UP
GAS PNL BLDV: SIGNIFICANT CHANGE UP
GLUCOSE BLDV-MCNC: 105 MG/DL — HIGH (ref 70–99)
GLUCOSE SERPL-MCNC: 114 MG/DL — HIGH (ref 70–99)
GLUCOSE SERPL-MCNC: 124 MG/DL — HIGH (ref 70–99)
GLUCOSE UR QL: NEGATIVE — SIGNIFICANT CHANGE UP
HCO3 BLDV-SCNC: 26 MMOL/L — SIGNIFICANT CHANGE UP (ref 21–29)
HCT VFR BLD CALC: 32.4 % — LOW (ref 34.5–45)
HCT VFR BLDA CALC: 32 % — LOW (ref 39–50)
HGB BLD CALC-MCNC: 10.3 G/DL — LOW (ref 11.5–15.5)
HGB BLD-MCNC: 10.6 G/DL — LOW (ref 11.5–15.5)
HYALINE CASTS # UR AUTO: 1 /LPF — SIGNIFICANT CHANGE UP (ref 0–2)
INR BLD: 1.26 RATIO — HIGH (ref 0.88–1.16)
KETONES UR-MCNC: NEGATIVE — SIGNIFICANT CHANGE UP
LACTATE BLDV-MCNC: 1.2 MMOL/L — SIGNIFICANT CHANGE UP (ref 0.7–2)
LEUKOCYTE ESTERASE UR-ACNC: ABNORMAL
MCHC RBC-ENTMCNC: 27.1 PG — SIGNIFICANT CHANGE UP (ref 27–34)
MCHC RBC-ENTMCNC: 32.6 GM/DL — SIGNIFICANT CHANGE UP (ref 32–36)
MCV RBC AUTO: 83.3 FL — SIGNIFICANT CHANGE UP (ref 80–100)
NITRITE UR-MCNC: POSITIVE
PCO2 BLDV: 46 MMHG — SIGNIFICANT CHANGE UP (ref 35–50)
PH BLDV: 7.36 — SIGNIFICANT CHANGE UP (ref 7.35–7.45)
PH UR: 6.5 — SIGNIFICANT CHANGE UP (ref 5–8)
PLATELET # BLD AUTO: 321 K/UL — SIGNIFICANT CHANGE UP (ref 150–400)
PO2 BLDV: 23 MMHG — LOW (ref 25–45)
POTASSIUM BLDV-SCNC: 5.6 MMOL/L — HIGH (ref 3.5–5.3)
POTASSIUM SERPL-MCNC: 4.6 MMOL/L — SIGNIFICANT CHANGE UP (ref 3.5–5.3)
POTASSIUM SERPL-MCNC: 5.4 MMOL/L — HIGH (ref 3.5–5.3)
POTASSIUM SERPL-SCNC: 4.6 MMOL/L — SIGNIFICANT CHANGE UP (ref 3.5–5.3)
POTASSIUM SERPL-SCNC: 5.4 MMOL/L — HIGH (ref 3.5–5.3)
PROT SERPL-MCNC: 7.7 G/DL — SIGNIFICANT CHANGE UP (ref 6–8.3)
PROT UR-MCNC: ABNORMAL
PROTHROM AB SERPL-ACNC: 14.6 SEC — HIGH (ref 10–12.9)
RAPID RVP RESULT: DETECTED
RBC # BLD: 3.89 M/UL — SIGNIFICANT CHANGE UP (ref 3.8–5.2)
RBC # FLD: 15.5 % — HIGH (ref 10.3–14.5)
RBC CASTS # UR COMP ASSIST: 1 /HPF — SIGNIFICANT CHANGE UP (ref 0–4)
RV+EV RNA SPEC QL NAA+PROBE: DETECTED
SAO2 % BLDV: 30 % — LOW (ref 67–88)
SODIUM SERPL-SCNC: 142 MMOL/L — SIGNIFICANT CHANGE UP (ref 135–145)
SODIUM SERPL-SCNC: 145 MMOL/L — SIGNIFICANT CHANGE UP (ref 135–145)
SP GR SPEC: 1.02 — SIGNIFICANT CHANGE UP (ref 1.01–1.02)
TROPONIN T, HIGH SENSITIVITY RESULT: 113 NG/L — HIGH (ref 0–51)
UROBILINOGEN FLD QL: NEGATIVE — SIGNIFICANT CHANGE UP
WBC # BLD: 10.7 K/UL — HIGH (ref 3.8–10.5)
WBC # FLD AUTO: 10.7 K/UL — HIGH (ref 3.8–10.5)
WBC UR QL: 17 /HPF — HIGH (ref 0–5)

## 2019-07-02 PROCEDURE — 99285 EMERGENCY DEPT VISIT HI MDM: CPT

## 2019-07-02 PROCEDURE — 99223 1ST HOSP IP/OBS HIGH 75: CPT

## 2019-07-02 PROCEDURE — 70450 CT HEAD/BRAIN W/O DYE: CPT | Mod: 26

## 2019-07-02 PROCEDURE — 72125 CT NECK SPINE W/O DYE: CPT | Mod: 26

## 2019-07-02 PROCEDURE — 99223 1ST HOSP IP/OBS HIGH 75: CPT | Mod: GC

## 2019-07-02 PROCEDURE — 71250 CT THORAX DX C-: CPT | Mod: 26

## 2019-07-02 PROCEDURE — 71045 X-RAY EXAM CHEST 1 VIEW: CPT | Mod: 26

## 2019-07-02 RX ORDER — FUROSEMIDE 40 MG
20 TABLET ORAL ONCE
Refills: 0 | Status: COMPLETED | OUTPATIENT
Start: 2019-07-02 | End: 2019-07-02

## 2019-07-02 RX ORDER — HALOPERIDOL DECANOATE 100 MG/ML
5 INJECTION INTRAMUSCULAR ONCE
Refills: 0 | Status: COMPLETED | OUTPATIENT
Start: 2019-07-02 | End: 2019-07-02

## 2019-07-02 RX ORDER — MONTELUKAST 4 MG/1
10 TABLET, CHEWABLE ORAL DAILY
Refills: 0 | Status: DISCONTINUED | OUTPATIENT
Start: 2019-07-02 | End: 2019-07-08

## 2019-07-02 RX ORDER — BUDESONIDE, MICRONIZED 100 %
2 POWDER (GRAM) MISCELLANEOUS
Qty: 0 | Refills: 0 | DISCHARGE

## 2019-07-02 RX ORDER — PIPERACILLIN AND TAZOBACTAM 4; .5 G/20ML; G/20ML
3.38 INJECTION, POWDER, LYOPHILIZED, FOR SOLUTION INTRAVENOUS ONCE
Refills: 0 | Status: COMPLETED | OUTPATIENT
Start: 2019-07-02 | End: 2019-07-02

## 2019-07-02 RX ORDER — INSULIN HUMAN 100 [IU]/ML
5 INJECTION, SOLUTION SUBCUTANEOUS ONCE
Refills: 0 | Status: DISCONTINUED | OUTPATIENT
Start: 2019-07-02 | End: 2019-07-02

## 2019-07-02 RX ORDER — DILTIAZEM HCL 120 MG
30 CAPSULE, EXT RELEASE 24 HR ORAL EVERY 12 HOURS
Refills: 0 | Status: DISCONTINUED | OUTPATIENT
Start: 2019-07-02 | End: 2019-07-08

## 2019-07-02 RX ORDER — ATORVASTATIN CALCIUM 80 MG/1
10 TABLET, FILM COATED ORAL AT BEDTIME
Refills: 0 | Status: DISCONTINUED | OUTPATIENT
Start: 2019-07-02 | End: 2019-07-08

## 2019-07-02 RX ORDER — DILTIAZEM HCL 120 MG
60 CAPSULE, EXT RELEASE 24 HR ORAL EVERY 12 HOURS
Refills: 0 | Status: DISCONTINUED | OUTPATIENT
Start: 2019-07-02 | End: 2019-07-02

## 2019-07-02 RX ORDER — AMIODARONE HYDROCHLORIDE 400 MG/1
100 TABLET ORAL DAILY
Refills: 0 | Status: DISCONTINUED | OUTPATIENT
Start: 2019-07-02 | End: 2019-07-08

## 2019-07-02 RX ORDER — DIPHENHYDRAMINE HCL 50 MG
12.5 CAPSULE ORAL ONCE
Refills: 0 | Status: COMPLETED | OUTPATIENT
Start: 2019-07-02 | End: 2019-07-02

## 2019-07-02 RX ORDER — HALOPERIDOL DECANOATE 100 MG/ML
5 INJECTION INTRAMUSCULAR ONCE
Refills: 0 | Status: DISCONTINUED | OUTPATIENT
Start: 2019-07-02 | End: 2019-07-06

## 2019-07-02 RX ORDER — SENNA PLUS 8.6 MG/1
2 TABLET ORAL AT BEDTIME
Refills: 0 | Status: DISCONTINUED | OUTPATIENT
Start: 2019-07-02 | End: 2019-07-08

## 2019-07-02 RX ORDER — DOCUSATE SODIUM 100 MG
100 CAPSULE ORAL DAILY
Refills: 0 | Status: DISCONTINUED | OUTPATIENT
Start: 2019-07-02 | End: 2019-07-08

## 2019-07-02 RX ORDER — DEXTROSE 50 % IN WATER 50 %
50 SYRINGE (ML) INTRAVENOUS ONCE
Refills: 0 | Status: DISCONTINUED | OUTPATIENT
Start: 2019-07-02 | End: 2019-07-02

## 2019-07-02 RX ORDER — ALBUTEROL 90 UG/1
0 AEROSOL, METERED ORAL
Qty: 0 | Refills: 0 | DISCHARGE

## 2019-07-02 RX ORDER — FUROSEMIDE 40 MG
1 TABLET ORAL
Qty: 0 | Refills: 0 | DISCHARGE

## 2019-07-02 RX ORDER — BUDESONIDE, MICRONIZED 100 %
0.5 POWDER (GRAM) MISCELLANEOUS EVERY 12 HOURS
Refills: 0 | Status: DISCONTINUED | OUTPATIENT
Start: 2019-07-02 | End: 2019-07-08

## 2019-07-02 RX ORDER — LEVETIRACETAM 250 MG/1
250 TABLET, FILM COATED ORAL EVERY 12 HOURS
Refills: 0 | Status: DISCONTINUED | OUTPATIENT
Start: 2019-07-02 | End: 2019-07-08

## 2019-07-02 RX ADMIN — ATORVASTATIN CALCIUM 10 MILLIGRAM(S): 80 TABLET, FILM COATED ORAL at 23:30

## 2019-07-02 RX ADMIN — Medication 20 MILLIGRAM(S): at 15:50

## 2019-07-02 RX ADMIN — Medication 12.5 MILLIGRAM(S): at 14:18

## 2019-07-02 RX ADMIN — HALOPERIDOL DECANOATE 5 MILLIGRAM(S): 100 INJECTION INTRAMUSCULAR at 14:19

## 2019-07-02 RX ADMIN — PIPERACILLIN AND TAZOBACTAM 200 GRAM(S): 4; .5 INJECTION, POWDER, LYOPHILIZED, FOR SOLUTION INTRAVENOUS at 15:50

## 2019-07-02 RX ADMIN — Medication 30 MILLIGRAM(S): at 20:03

## 2019-07-02 RX ADMIN — LEVETIRACETAM 250 MILLIGRAM(S): 250 TABLET, FILM COATED ORAL at 20:03

## 2019-07-02 NOTE — H&P ADULT - ASSESSMENT
89 yo F w/ PMHx of Afib (on Eliquis), CVA, advanced dementia, Epilepsy, multiple falls, HTN, and COPD ( on PRN 2L O2 at home) who is presenting for hypoxia 2/2 b/l pleural effusions likely from cardiac etiology

## 2019-07-02 NOTE — H&P ADULT - PROBLEM SELECTOR PLAN 3
- f/u repeat BMP. Pt received IV 20 lasix in ED  - EKG showed 1st degree AV block; may be chronic and may be 2/2 hyperkalemia  - if repeat BMP still shows elevated K give 5U reg insulin w/ 50ml of dextrose 50% and get repeat EKG to ensure that VA interval not more prolonged - f/u repeat BMP. Pt received IV 20 lasix in ED  - No peaked t waves on EKG  - EKG showed 1st degree AV block  - if repeat BMP still shows elevated K give 5U reg insulin w/ 50ml of dextrose 50% and get repeat EKG to ensure that CO interval not more prolonged

## 2019-07-02 NOTE — ED PROVIDER NOTE - CLINICAL SUMMARY MEDICAL DECISION MAKING FREE TEXT BOX
Dr. Reddy Note: +JVD and rales c/w CHF along with sweats and R sided rhonchi in elderly likely aspiration pneumonitis along with new Cr elevation c/w DIANA, r/o ICH from fall one week ago, will need admission for said above issues

## 2019-07-02 NOTE — ED PROVIDER NOTE - PROGRESS NOTE DETAILS
Spoke with patient's daughter. States that her mother will walk by herself and can fall, requests fall precautions and increased supervision, DNR/DNI. - NATHAN BuchananC

## 2019-07-02 NOTE — CONSULT NOTE ADULT - ASSESSMENT
89 yo F former smoker (20 py) with a h/o HTN, HLD, Afib on eliquis, valvular disease (AS, AI, MS, MR), dementia (baseline oriented x 1-2, requires assistance with all ADLS, ambulates with walker) who was BIBEMS for progressive SOB, cough, weakness for the past 5 days.   Had a mechanical fall at home 5 days prior, was ambulating on her own without walker assistance, denies LOC.  Aid states that since last week has been having "cold sweats", rhonchorous sounds in her chest, and dry cough. Using her Oxygen at 2 LNC more frequently (typically PRN)  Denies LE edema, abdominal pain or dysuria.  Hypertensive on arrival, EKG normal sinus rhythm with first degree AV block.   CXR: bibasilar opacities - likely fluid  Labs significant for DIANA on CKD, hyperkalemia to 5.4, mild leukocytosis of 10.7, anemia      A/P: Hypoxemia, progressive dyspnea, imaging c/w b/l pleural effusions, adjacent mild compressive atelectasis. On exam, evidence of JVD, crackles. UA also positive  1. Acute on chronic systolic heart failure, Echo from 11/2017 with mod-severe AS, severe AR and moderate MR  Would send proBNP, cardiac enzymes  -Diurese, cardiac optimization  -Strict ins/outs  -Repeat TTE    2.  UTI, asymptomatic in elderly/dementia -   Send for urine cultures  Would start antibiotics as per primary team    3. Dementia, s/p fall - CT head and c-spine - very limited studies, no gross pathology on CT head.  Fall precautions  Support and frequent orientation    4.  Hypoxemia, cough - Bilateral pleural effusions, no evidence of pneumonia on imaging.  Recommend diuresis  Duonebs, Budesonide nebs  Supplemental O2, goal sats 92-96%  Incentive spirometer and out of bed to chair as able  Acapella device to aid with airway clearance    5. DVT ppx    Thank you for the consult.  Dr. Briones to follow up.

## 2019-07-02 NOTE — H&P ADULT - PROBLEM SELECTOR PLAN 5
- c/w 100mg amio qd  - will dose 30mg Diltiazem BID in setting of heart failure likely 2/2 valvular dysfunction   - not restarting Eliquis until adequate CT head is performed to r/o bleed - c/w 100mg amio qd  - will dose 30mg Diltiazem BID in setting of heart failure likely 2/2 valvular dysfunction -- half home dose in setting of decompensated HF.   - not restarting Eliquis until adequate CT head is performed to r/o bleed

## 2019-07-02 NOTE — ED PROVIDER NOTE - PHYSICAL EXAMINATION
GEN: Patient wearing O2, snoring breathing which aid states is at patient's baseline  HEENT: NC/AT, Symm [place]  CV: +S1S2, RRR w/o m/g/r  RESP: Diffuse wheezing auscultated   ABD: Soft, nt/nd, +BS.   EXT/MSK: No lower extremity edema or calf tenderness.  SKIN: +superficial abrasion 2cm by patient's left elbow   Neuro: AOX1-2, patient does not respond to commands when asked to move/lift extremities

## 2019-07-02 NOTE — ED ADULT NURSE NOTE - OBJECTIVE STATEMENT
89 year old female with a pmh of Dementia, Afib on Eliquis, HTN, CVA, Dementia, epilepsy, Pulmonary disease  presenting to ed with dyspnea and hypoxia today. per private aide at bedside, pt fell last week, was not evaluated since then. has been acting at baseline since then but today pulse ox was 80% on room air. patient on 02 via nc PRN at home, placed on 02 and increased to >95%. noted with intermittent productive cough. currently in no respiratory distress.

## 2019-07-02 NOTE — ED ADULT NURSE REASSESSMENT NOTE - NS ED NURSE REASSESS COMMENT FT1
Rep[ort received from BENJAMIN Garnett, blood sent to lab, pt eating applesauce and jello. pt to go for head CT

## 2019-07-02 NOTE — H&P ADULT - ATTENDING COMMENTS
Patient seen and examined with admitting resident. I agree with Dr. Pryor's findings, assessment, and plan with the following additions and exceptions:     ASSESSMENT  #. Acute hypoxic respiratory failure   #. Acute on chronic decompensated systolic heart failure -- secondary to valvulopathies.  #. Acute renal failure -- no signs or symptoms of retention. Urinating without issue.   #. Hyperkalemia  #. Mechanical Fall   #. Atrial fibrillation     PLAN:  -S/p lasix 20 mg ivp x1.   -Goal of net negative 1L given preload sensitive state of patient.   -Half home dose diltiazem given HF.   -Consider hydralazine for afterload reduction especially given report of AR on prior TTE.   -Cardiology consult in AM for assistance with management of HF.   -Follow up repeat RUDOLPH and repeat troponin.   -PT / OOBTC / IS.   -Pulmonary team assistance greatly appreciated.  -Urine lytes  -Serial BMP. Monitor hyperkalemia s/p lasix. No EKG changes currently.  -Hold Eliquis until repeat CT head performed to rule out ICH.   -DISPO pending PT recommendations     Dr. Francisco Mcmahan DO.  Hospitalist  [phone] Patient seen and examined with admitting resident. I agree with Dr. Pryor's findings, assessment, and plan with the following additions and exceptions:     ASSESSMENT  #. Acute hypoxic respiratory failure   #. Acute on chronic decompensated systolic heart failure -- secondary to valvulopathies.  #. Acute renal failure -- no signs or symptoms of retention. Urinating without issue.   #. Hyperkalemia  #. Mechanical Fall   #. Atrial fibrillation     PLAN:  -S/p lasix 20 mg ivp x1.   -CXR in AM to evaluate pleural effusions s/p diuresis.  -Goal of net negative 500 - 1000 mL overnight given preload sensitive state of patient.   -Half home dose diltiazem given HF.   -Consider hydralazine for afterload reduction especially given report of AR on prior TTE.   -Cardiology consult in AM for assistance with management of HF.   -Follow up repeat RUDOLPH and repeat troponin.   -PT / OOBTC / IS.   -Pulmonary team assistance greatly appreciated.  -Urine lytes  -Serial BMP. Monitor hyperkalemia s/p lasix. No EKG changes currently.  -Hold Eliquis until repeat CT head performed to rule out ICH.   -DISPO pending PT recommendations     Dr. Francisco Mcmahan DO.  Hospitalist  [phone]

## 2019-07-02 NOTE — H&P ADULT - NSHPLABSRESULTS_GEN_ALL_CORE
< from: Transthoracic Echocardiogram (11.20.17 @ 13:20) >    Conclusions:  1. Mitral annular calcification and calcifiedmitral  leaflets with decreased diastolic opening. Moderate mitral  regurgitation. Mean transmitral valve gradient equals 6 mm  Hg, estimated mitral valve area equals 1.9 sqcm (by  pressure half time equation), consistent with mild mitral  stenosis.  2. Calcified trileaflet aortic valve with decreased  opening. Peak transaortic valve gradient equals 55 mm Hg,  mean transaortic valve gradient equals 27 mm Hg, estimated  aortic valve area equals 1 sqcm (by continuity equation),  aortic valve velocity time integral equals 66 cm,  consistent with moderate-severe aortic stenosis. The  transaortic gradient may be elevated due to aortic  regurgitation, which  would overestimate severity of aortic  stenosis. Severe aortic regurgitation.  3. Hyperdynamic left ventricular systolic function.  4. Normal right ventricular size and function.  5. Normal pulmonic valve. Mild-moderate pulmonic  regurgitation.  *** Compared with echocardiogram of 1/4/2017, no  significant changes noted.  ------------------------------------------------------------------------  Confirmed on  11/20/2017 - 17:02:06 by Clementine Estes M.D.  ------------------------------------------------------------------------    < end of copied text > Reviewed by me   LABS:  CAPILLARY BLOOD GLUCOSE                              10.6   10.7  )-----------( 321      ( 2019 12:42 )             32.4     Auto Eosinophil # x     / Auto Eosinophil % x     / Auto Neutrophil # x     / Auto Neutrophil % x     / BANDS % x        Hgb Trend: 10.6<--  0702    142  |  106  |  52<H>  ----------------------------<  114<H>  5.4<H>   |  23  |  2.04<H>    Ca    9.6      2019 12:42  TPro  7.7  /  Alb  4.0  /  TBili  0.5  /  DBili  0.1  /  AST  42<H>  /  ALT  39  /  AlkPhos  109      Creatinine Trend: 2.04<--  PT/INR - ( 2019 12:42 )   PT: 14.6 sec;   INR: 1.26 ratio         PTT - ( 2019 12:42 )  PTT:37.3 sec      Urinalysis Basic - ( 2019 15:23 )    Color: Light Yellow / Appearance: Clear / S.017 / pH: x  Gluc: x / Ketone: Negative  / Bili: Negative / Urobili: Negative   Blood: x / Protein: 30 mg/dL / Nitrite: Positive   Leuk Esterase: Large / RBC: 1 /hpf / WBC 17 /HPF   Sq Epi: x / Non Sq Epi: 5 /hpf / Bacteria: Many  ABG:   VBG: ( 12:42 ) - VBG - pH: 7.36  | pCO2: 46    | pO2: 23    | Lactate: 1.2    < from: Transthoracic Echocardiogram (11.20.17 @ 13:20) >    Conclusions:  1. Mitral annular calcification and calcified mitral  leaflets with decreased diastolic opening. Moderate mitral  regurgitation. Mean transmitral valve gradient equals 6 mm  Hg, estimated mitral valve area equals 1.9 sqcm (by  pressure half time equation), consistent with mild mitral  stenosis.  2. Calcified trileaflet aortic valve with decreased  opening. Peak transaortic valve gradient equals 55 mm Hg,  mean transaortic valve gradient equals 27 mm Hg, estimated  aortic valve area equals 1 sqcm (by continuity equation),  aortic valve velocity time integral equals 66 cm,  consistent with moderate-severe aortic stenosis. The  transaortic gradient may be elevated due to aortic  regurgitation, which  would overestimate severity of aortic  stenosis. Severe aortic regurgitation.  3. Hyperdynamic left ventricular systolic function.  4. Normal right ventricular size and function.  5. Normal pulmonic valve. Mild-moderate pulmonic  regurgitation.  *** Compared with echocardiogram of 2017, no  significant changes noted.        IMPRESSION: Moderate-sized bilateral pleural effusions with bibasilar   atelectasis.    IMPRESSION:    CT brain:    Significantly limited by motion. No gross evidence for acute intracranial   hemorrhage or displaced calvarial fracture.    CT cervical spine:    Significantly limited by motion. Essentially nondiagnostic.    < end of copied text >    CXR  Impression:    The heart is enlarged. Right pleural effusion. Left lower lobe pneumonia   and/or atelectasis.. For further  evaluation CT scan should be obtained. Reviewed by me   LABS:               10.6   10.7  )-----------( 321      ( 2019 12:42 )             32.4     Auto Eosinophil # x     / Auto Eosinophil % x     / Auto Neutrophil # x     / Auto Neutrophil % x     / BANDS % x        Hgb Trend: 10.6<--  0702    142  |  106  |  52<H>  ----------------------------<  114<H>  5.4<H>   |  23  |  2.04<H>    Ca    9.6      2019 12:42  TPro  7.7  /  Alb  4.0  /  TBili  0.5  /  DBili  0.1  /  AST  42<H>  /  ALT  39  /  AlkPhos  109      Creatinine Trend: 2.04<--  PT/INR - ( 2019 12:42 )   PT: 14.6 sec;   INR: 1.26 ratio         PTT - ( 2019 12:42 )  PTT:37.3 sec      Urinalysis Basic - ( 2019 15:23 )    Color: Light Yellow / Appearance: Clear / S.017 / pH: x  Gluc: x / Ketone: Negative  / Bili: Negative / Urobili: Negative   Blood: x / Protein: 30 mg/dL / Nitrite: Positive   Leuk Esterase: Large / RBC: 1 /hpf / WBC 17 /HPF   Sq Epi: x / Non Sq Epi: 5 /hpf / Bacteria: Many  ABG:   VBG: ( 12:42 ) - VBG - pH: 7.36  | pCO2: 46    | pO2: 23    | Lactate: 1.2    < from: Transthoracic Echocardiogram (11.20.17 @ 13:20) >    Conclusions:  1. Mitral annular calcification and calcified mitral  leaflets with decreased diastolic opening. Moderate mitral  regurgitation. Mean transmitral valve gradient equals 6 mm  Hg, estimated mitral valve area equals 1.9 sqcm (by  pressure half time equation), consistent with mild mitral  stenosis.  2. Calcified trileaflet aortic valve with decreased  opening. Peak transaortic valve gradient equals 55 mm Hg,  mean transaortic valve gradient equals 27 mm Hg, estimated  aortic valve area equals 1 sqcm (by continuity equation),  aortic valve velocity time integral equals 66 cm,  consistent with moderate-severe aortic stenosis. The  transaortic gradient may be elevated due to aortic  regurgitation, which  would overestimate severity of aortic  stenosis. Severe aortic regurgitation.  3. Hyperdynamic left ventricular systolic function.  4. Normal right ventricular size and function.  5. Normal pulmonic valve. Mild-moderate pulmonic  regurgitation.  *** Compared with echocardiogram of 2017, no  significant changes noted.      IMPRESSION: Moderate-sized bilateral pleural effusions with bibasilar   atelectasis.    IMPRESSION:    CT brain:    Significantly limited by motion. No gross evidence for acute intracranial   hemorrhage or displaced calvarial fracture.    CT cervical spine:    Significantly limited by motion. Essentially nondiagnostic.    < end of copied text >    CXR  Impression:    The heart is enlarged. Right pleural effusion. Left lower lobe pneumonia   and/or atelectasis.. For further  evaluation CT scan should be obtained.

## 2019-07-02 NOTE — H&P ADULT - NSHPREVIEWOFSYSTEMS_GEN_ALL_CORE
CONSTITUTIONAL:  No weight loss, fever, chills, weakness or fatigue.  HEENT:  Eyes:  No visual loss, blurred vision, double vision or yellow sclerae. Ears, Nose, Throat:  No hearing loss, sneezing, congestion, runny nose or sore throat.  SKIN:  No rash or itching.  CARDIOVASCULAR:  No chest pain, chest pressure or chest discomfort. No palpitations or edema.  RESPIRATORY:  No shortness of breath, + cough + sputum.  GASTROINTESTINAL:  No anorexia, nausea, vomiting or diarrhea. No abdominal pain or blood.  GENITOURINARY:  Denies hematuria, dysuria.   NEUROLOGICAL:  No headache, dizziness, syncope, paralysis, ataxia, numbness or tingling in the extremities. No change in bowel or bladder control.  MUSCULOSKELETAL:  No muscle, back pain, joint pain or stiffness.  HEMATOLOGIC:  No anemia, bleeding or bruising.  LYMPHATICS:  No enlarged nodes. No history of splenectomy.  PSYCHIATRIC:  No history of depression or anxiety.  ENDOCRINOLOGIC:  No reports of sweating, cold or heat intolerance. No polyuria or polydipsia.  ALLERGIES:  No history of asthma, hives, eczema or rhinitis. CONSTITUTIONAL:  No weight loss, fever, chills, weakness or fatigue.  HEENT:  Eyes:  No visual loss, blurred vision, double vision or yellow sclerae. Ears, Nose, Throat:  No hearing loss, sneezing, congestion, runny nose or sore throat.  SKIN:  No rash or itching.  CARDIOVASCULAR:  No chest pain, chest pressure or chest discomfort. No palpitations or edema.  RESPIRATORY:  No shortness of breath, + cough + sputum.  GASTROINTESTINAL:  No anorexia, nausea, vomiting or diarrhea. No abdominal pain or blood.  GENITOURINARY:  Denies hematuria, dysuria, retention.  NEUROLOGICAL:  No headache, dizziness, syncope, paralysis, ataxia, numbness or tingling in the extremities. No change in bowel or bladder control.  MUSCULOSKELETAL:  No muscle, back pain, joint pain or stiffness.  HEMATOLOGIC:  No anemia, bleeding or bruising.  LYMPHATICS:  No enlarged nodes. No history of splenectomy.  PSYCHIATRIC:  No history of depression or anxiety.  ENDOCRINOLOGIC:  No reports of sweating, cold or heat intolerance. No polyuria or polydipsia.  ALLERGIES:  No history of asthma, hives, eczema or rhinitis.

## 2019-07-02 NOTE — H&P ADULT - PROBLEM SELECTOR PLAN 4
- CT head and cervical neck, non-diagnostic  - repeat head CT to r/o bleed before restarting Eliquis; activatable 5mg IV haldol for agitation beforehand ordered Report of mechanical fall per aid  - CT head and cervical neck, non-diagnostic  - repeat head CT to r/o bleed before restarting Eliquis; 5mg IV haldol for agitation beforehand ordered

## 2019-07-02 NOTE — ED PROVIDER NOTE - NS ED ROS FT
Constitutional: No fever   Eyes: No visual changes  CV: No chest pain or lower extremity edema  Resp: + SOB no cough  GI: No nausea or vomiting. No diarrhea.   : No dysuria, hematuria.   MSK: No musculoskeletal pain  Skin: No rash  Neuro: No headache. No numbness or tingling. No weakness.  +Fall

## 2019-07-02 NOTE — H&P ADULT - PROBLEM SELECTOR PLAN 2
- DIANA on CKD stage 4   - nephro consult in AM   - f/u urine lytes  - avoid nephrotoxic agents; no ACE/ARB Acute renal failure   - DIANA on CKD stage 4   - nephro consult in AM given GFR  - f/u urine lytes  - avoid nephrotoxic agents; no ACE/ARB

## 2019-07-02 NOTE — H&P ADULT - NSICDXPASTMEDICALHX_GEN_ALL_CORE_FT
PAST MEDICAL HISTORY:  Atrial fibrillation     CVA (cerebral vascular accident)     Dementia     Epilepsy     Fall     HTN (hypertension)     Pulmonary disease

## 2019-07-02 NOTE — ED PROVIDER NOTE - CARE PLAN
Principal Discharge DX:	CHF exacerbation  Secondary Diagnosis:	Aspiration pneumonia  Secondary Diagnosis:	DIANA (acute kidney injury)

## 2019-07-02 NOTE — CONSULT NOTE ADULT - SUBJECTIVE AND OBJECTIVE BOX
[facility] - Division of Pulmonary, Critical Care and Sleep Medicine   Please call [phone] between 8am-pm weekdays, [phone] after hours and weekends      CHIEF COMPLAINT:  Progressive SOB, cough and weakness for 5 days    NOTE: HISTORY IS AS PER THE PATIENT'S AID WHO IS AT BEDSIDE, PATIENT WITH DEMENTIA AND ONLY RESPONDS TO HER NAME AT THIS TIME, DOES NOT ANSWER QUESTIONS    HPI: 88 yo F former smoker (20 py) with a h/o HTN, HLD, Afib on eliquis, valvular disease (AS, AI, MS, MR), dementia (baseline oriented x 1-2, requires assistance with all ADLS, ambulates with walker) who was BIBEMS for progressive SOB, cough, weakness for the past 5 days.   Had a mechanical fall at home 5 days prior, was ambulating on her own without walker assistance, denies LOC.  Aid states that since last week has been having "cold sweats", rhonchorous sounds in her chest, and dry cough. Using her Oxygen at 2 LNC more frequently (typically PRN)  Denies LE edema, abdominal pain or dysuria.  Hypertensive on arrival, EKG normal sinus rhythm with first degree AV block.   CXR: bibasilar opacities - likely fluid  Labs significant for DIANA on CKD, hyperkalemia to 5.4, mild leukocytosis of 10.7, anemia    PAST MEDICAL & SURGICAL HISTORY:  Atrial fibrillation  Dementia  Pulmonary disease  Fall  CVA (cerebral vascular accident)  HTN (hypertension)  Epilepsy  S/P Hysterectomy      FAMILY HISTORY:  No pertinent family history in first degree relatives      SOCIAL HISTORY:  Smoking: [ ] Never Smoked [x ] Former Smoker (_1_ packs x _20__ years) [ ] Current Smoker  (__ packs x ___ years)  Substance Use: denies  EtOH Use: denies    Advance Directives: DNR/DNI - Aid provides MOLST form from her PMD's office    Allergies  No Known Allergies    Intolerances        HOME MEDICATIONS: reviewed. Budesonide nebs BID, albuterol prn    REVIEW OF SYSTEMS:  Constitutional: [- ] fevers [ +] chills [ ] weight loss [ ] weight gain  HEENT: [ ] dry eyes [ ] eye irritation [ ] postnasal drip [- ] nasal congestion  CV: [- ] chest pain [- ] orthopnea [ -] palpitations [+ ] murmur  Resp: [+ ] cough [ +] shortness of breath [- ] wheezing [- ] sputum [ -] hemoptysis  GI: [- ] nausea [- ] vomiting [ ] diarrhea [ ] constipation [ -] abd pain [? ] dysphagia- aid states she coughs frequently when eating, on soft diet   : [- ] dysuria [ ] nocturia [ ] hematuria [ ] increased urinary frequency  [x ] All other systems negative  [ ] Unable to assess ROS because ________    OBJECTIVE:  ICU Vital Signs Last 24 Hrs  T(C): 37.3 (2019 12:59), Max: 37.3 (2019 12:59)  T(F): 99.1 (2019 12:59), Max: 99.1 (2019 12:59)  HR: 79 (2019 12:59) (72 - 79)  BP: 174/51 (2019 12:59) (135/68 - 174/51)  BP(mean): --  ABP: --  ABP(mean): --  RR: 20 (2019 12:59) (20 - 20)  SpO2: 100% (2019 12:59) (98% - 100%)        CAPILLARY BLOOD GLUCOSE          PHYSICAL EXAM:  General:  NAD  HEENT:  +abrasion left frontal   Neck: Supple, +JVD  Respiratory:  bibasilar reduced BS, diffuse crackles. no wheezes or rhonchi  Cardiovascular:  RRR, +ELIDIA   Abdomen: soft, NT/ND, +BS  Extremities: no clubbing, cyanosis or edema, warm  Skin: no rash  Neurological: awake, oriented to self. moves extremities spontaneously.     HOSPITAL MEDICATIONS:  MEDICATIONS  (STANDING):    MEDICATIONS  (PRN):      LABS:                        10.6   10.7  )-----------( 321      ( 2019 12:42 )             32.4     Hgb Trend: 10.6<--  07-02    142  |  106  |  52<H>  ----------------------------<  114<H>  5.4<H>   |  23  |  2.04<H>    Ca    9.6      2019 12:42    TPro  7.7  /  Alb  4.0  /  TBili  0.5  /  DBili  x   /  AST  42<H>  /  ALT  39  /  AlkPhos  109      Creatinine Trend: 2.04<--  PT/INR - ( 2019 12:42 )   PT: 14.6 sec;   INR: 1.26 ratio         PTT - ( 2019 12:42 )  PTT:37.3 sec  Urinalysis Basic - ( 2019 15:23 )    Color: Light Yellow / Appearance: Clear / S.017 / pH: x  Gluc: x / Ketone: Negative  / Bili: Negative / Urobili: Negative   Blood: x / Protein: 30 mg/dL / Nitrite: Positive   Leuk Esterase: Large / RBC: 1 /hpf / WBC 17 /HPF   Sq Epi: x / Non Sq Epi: 5 /hpf / Bacteria: Many        Venous Blood Gas:   @ 12:42  7.36/46/23//30  VBG Lactate: 1.2      MICROBIOLOGY: none    RADIOLOGY:  [x ] Reviewed and interpreted by me  < from: Xray Chest 1 View- PORTABLE-Urgent (19 @ 12:56) >  EXAM:  XR CHEST PORTABLE URGENT 1V                            PROCEDURE DATE:  2019            INTERPRETATION:  A single chest x-ray was obtained on 2019.    Indication: Shortness of breath.    Impression:    The heart is enlarged. Right pleural effusion. Left lower lobe pneumonia   and/or atelectasis.. For further  evaluation CT scan should be obtained.    MICA DOUGLAS M.D., ATTENDING RADIOLOGIST  This document has been electronically signed. 2019  1:15PM    < end of copied text >        < from: CT Chest No Cont (19 @ 15:00) >  EXAM:  CT CHEST                            PROCEDURE DATE:  2019            INTERPRETATION:  Clinical indications: Evaluate for pneumonia, status   post fall.    Axial CT images of the chest are obtained without intravenous   administration of contrast.    Comparison is made with the prior chest CT of 2017.    There are no enlarged bilateral axillary, mediastinal or hilar lymph   nodes.    There is no pericardial effusion. There are calcifications of the mitral   annulus and aortic root including the aortic valves. There is   atherosclerotic disease of the aorta. The aorta is tortuous unchanged in   appearance since the prior study. The ascending aorta at the level of the   main pulmonary artery measures about 3.5 cm and the descending thoracic   aorta measures about 3.1 cm unchanged. The distal aspect of the aortic   arch measures about 3.7 cm unchanged.    Evaluation of the upper abdominal organs demonstrate slight increased   density of the liver which can be seen in the setting of multiple prior   transfusions.    There are moderate-sized bilateral pleural effusions with a small   loculated component within the left fissure.    Evaluation of the lungs demonstrate areas of partial compressive   atelectasis within the lower lobes. There are other areas of linear or   subsegmental atelectasis within the right middle lobe. There is a 2 mm   left upper lobe calcified granuloma as on the prior study. There is a   nonspecific 2 mm left upper lobe noncalcified pulmonary nodule on image   46 of series 2 new since the prior study.    There are no central endobronchial lesions.    Evaluation of the bones demonstrate degenerative changes of the shoulders   and the spine. There are healed right rib old fractures. There is some   loss of height of T6-T8 vertebral bodies unchanged.    IMPRESSION: Moderate-sized bilateral pleural effusions with bibasilar   atelectasis.    PETER CHAVEZ M.D. ATTENDING RADIOLOGIST    < end of copied text >      < from: CT Cervical Spine No Cont (19 @ 15:00) >  EXAM:  CT BRAIN                          EXAM:  CT CERVICAL SPINE                            PROCEDURE DATE:  2019            INTERPRETATION:  Noncontrast CT of the brain and cervical spine    CLINICAL INDICATION: Fall on floor, on blood thinners    TECHNIQUE: Axial CT scanning of the brain and cervical spine were   obtained without the administration of intravenous contrast.  Images were   reformatted in the sagittal and coronal planes to evaluate osseous   alignment.     COMPARISON: CT brain and cervical spine 2018    FINDINGS:    CT brain:    Study is significantly limited by motion.    Ventricles are stable in size and mildly dilated, likely due to   age-appropriate atrophy. Extensive white matter microvascular ischemic   disease is again seen.    No gross evidence for acute intracranial hemorrhage or displaced   calvarial fracture.    The visualized paranasal sinuses and mastoid air cells are clear.    CT cervical spine:    Study is significantly limited by motion and is essentially nondiagnostic.    IMPRESSION:    CT brain:    Significantly limited by motion. No gross evidence for acute intracranial   hemorrhage or displaced calvarial fracture.    CT cervical spine:    Significantly limited by motion. Essentially nondiagnostic.      < end of copied text >        < from: Transthoracic Echocardiogram (17 @ 13:20) >  PROCEDURE: Transthoracic echocardiogram with 2-D, M-Mode  and complete spectral and color flow Doppler. Strain  Imaging.  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 Moderate  mitral regurgitation. Mean transmitral valve gradient  equals 6 mm Hg, estimated mitral valve area equals 1.9 sqcm  (by pressure half time equation), consistent with mild  mitral stenosis.  Aortic Valve/Aorta: Calcified trileaflet aortic valve with  decreased opening. Peak transaortic valve gradient equals  55 mm Hg, mean transaortic valve gradient equals 27 mm Hg,  estimated aortic valve area equals 1 sqcm (by continuity  equation), aortic valve velocity time integral equals 66  cm, consistent with moderate-severe aortic stenosis. The  transaortic gradient may be elevated due to aortic  regurgitation, which  would overestimate severity of aortic  stenosis. Severe aortic regurgitation. Peak left  ventricular outflow tract gradient equals 9 mm Hg, mean  gradient is equal to 5 mm Hg,LVOT velocity time integral  equals 27 cm.  Aortic Root: 2.2 cm.  LVOT diameter: 1.8 cm.  Left Atrium: Mildly dilated left atrium.  LA volume index =  38 cc/m2.  Left Ventricle: Hyperdynamic left ventricular systolic  function. Increased relative wall thickness with normal  left ventricular mass index, consistent with concentric  left ventricular remodeling.  Right Heart: Normal right atrium. Normal right ventricular  size and function. Normal tricuspid valve. Mild tricuspid  regurgitation. Normal pulmonic valve. Mild-moderate  pulmonic regurgitation.  Pericardium/Pleura: Normal pericardium with no pericardial  effusion.  Right pleural effusion.  Hemodynamic: Estimated right atrial pressure is 8 mm Hg.  Estimated right ventricular systolic pressure equals 26 mm  Hg, assuming right atrial pressure equals 8 mm Hg,  consistent with normal pulmonary pressures.  ------------------------------------------------------------------------  Conclusions:  1. Mitral annular calcification and calcifiedmitral  leaflets with decreased diastolic opening. Moderate mitral  regurgitation. Mean transmitral valve gradient equals 6 mm  Hg, estimated mitral valve area equals 1.9 sqcm (by  pressure half time equation), consistent with mild mitral  stenosis.  2. Calcified trileaflet aortic valve with decreased  opening. Peak transaortic valve gradient equals 55 mm Hg,  mean transaortic valve gradient equals 27 mm Hg, estimated  aortic valve area equals 1 sqcm (by continuity equation),  aortic valve velocity time integral equals 66 cm,  consistent with moderate-severe aortic stenosis. The  transaortic gradient may be elevated due to aortic  regurgitation, which  would overestimate severity of aortic  stenosis. Severe aortic regurgitation.  3. Hyperdynamic left ventricular systolic function.  4. Normal right ventricular size and function.  5. Normal pulmonic valve. Mild-moderate pulmonic  regurgitation.  *** Compared with echocardiogram of 2017, no  significant changes noted.  ------------------------------------------------------------------------  Confirmed on  2017 - 17:02:06 by Clementine Estes M.D.  ------------------------------------------------------------------------    < end of copied text >

## 2019-07-02 NOTE — ED ADULT NURSE REASSESSMENT NOTE - NS ED NURSE REASSESS COMMENT FT1
pt straight cath per md orders using aseptic technique and assistance from RN coworker. patient tolerated well. approximately 150cc of clear yellow urine noted,   pt admitted to hospital, rtm clicked waiting for bed assignment.

## 2019-07-02 NOTE — H&P ADULT - PROBLEM SELECTOR PLAN 1
- pt presenting w/ hypoxic respiratory failure 2/2 b/l moderate pleural effusion likely 2/2 cardiac etiology  - 2017 showed mild mitral stenosis w/ moderate-severe aortic stenosis and severe aortic regurgitation   - TTE ordered; consult cardiology in AM   - f/u pro-BNP; Trop T elevated - likely 2/2 to demand ischemia and DIANA on CKD; pt not endorsing any chest pain and EKG showing now PAPA/STD or TWI; f/u repeat Trop  - c/w 20 IV lasix qd; monitor input and output and daily weights; goal to be neg 500 to 1000ml in setting of aortic stenosis   - consult cardiology in AM   - Less likely 2/2 PNA as pt not presenting w/ fever, significant cough; s/p 1 dose of zosyn in ED.  Will not c/w zosyn. Acute on chronic decompensated systolic heart failure secondary to multiple valvulopathies.  - pt presenting w/ hypoxic respiratory failure 2/2 b/l moderate pleural effusion likely 2/2 cardiac etiology  - 2017 showed mild mitral stenosis w/ moderate-severe aortic stenosis and severe aortic regurgitation   - TTE ordered; consult cardiology in AM   - f/u pro-BNP; Trop T elevated - likely 2/2 to demand ischemia and DIANA on CKD; pt not endorsing any chest pain and EKG showing no PAPA/STD or TWI; f/u repeat Trop  - c/w 20 IV lasix qd; monitor input and output and daily weights; goal to be neg 500 to 1000ml in setting of aortic stenosis   - consult cardiology in AM   - Less likely 2/2 PNA as pt not presenting w/ fever, significant cough; s/p 1 dose of zosyn in ED.  Will not c/w zosyn.

## 2019-07-02 NOTE — ED PROVIDER NOTE - ATTENDING CONTRIBUTION TO CARE
Pt s/p fall one week ago with worsening dyspnea, cough, intermittent sweats.  +JVD, coarse bs b/l with rales, R sided rhonchi, abdomen soft, nt, disoriented but awake and alert, no peripheral edema.

## 2019-07-02 NOTE — H&P ADULT - PROBLEM SELECTOR PLAN 10
- DVT ppx: SCD's until head CT completed; if no bleed can restart Eliquis  - Diet: dysphagia 1 w/ honey consistency  - Dispo: PT consult, speech and swallow consult     Halina Pryor MD  Internal Medicine, PGY-2  [phone]

## 2019-07-02 NOTE — H&P ADULT - PROBLEM SELECTOR PLAN 7
- Pt hypertensive in ED; didn't take medications today.   - will start on 1/2 dose of home diltiazem in setting of heart failure likely 2/2 valvular disorder  - if BP > 170 can give IVP 10 hydralazine; hold off on BB's

## 2019-07-02 NOTE — H&P ADULT - PROBLEM SELECTOR PLAN 6
- will order speech and swallow consult to ensure not aspirating at home  - for now will place on dysphagia 1 diet w/ honey thickened fluids   - Aspiration precautions

## 2019-07-02 NOTE — ED PROVIDER NOTE - OBJECTIVE STATEMENT
88 y/o female on Eliquis, with PMH HTN, Dementia, CVA presents to ED s/p fall on Thursday of last week. Patient walked without assistance, unwitnessed and fell. She then called out for her aid who came to her right away. She called her PCP who told her to stop taking the blood thinner for the day and then to resume but didn't believe patient needed any further workup at that time.  At baseline patient is A+O x 1-2. Patient can take a few steps with her walker, but doesn't walk too much.   Per patient's aid at bedside patient is at baseline, although states that she has been needing her home O2 more recently. She also states that she has been wheezing more frequently with a cough.  Denies change in patient's urinary habits.

## 2019-07-02 NOTE — H&P ADULT - HISTORY OF PRESENT ILLNESS
88 yo F w/ PMHx of Afib (on Eliquis), CVA, advanced dementia, Epilepsy, multiple falls, HTN, and COPD who is presenting for 88 yo F w/ PMHx of Afib (on Eliquis), CVA, advanced dementia, Epilepsy, multiple falls, HTN, and COPD ( on PRN 2L O2 at home) who is presenting for hypoxia.  Pt's aide noticed that her SpO2 was 73% on RA and tried giving her a budesonide treatment which the pt did not tolerate and so she brought her the ED for further evaluation.  Pt AAO x 1 at baseline but was answering questions; has a chronic wet cough but per aide and daughter she swallows the sputum so they don't know what color it is; pt herself denies sore throat or rhinorrhea, no fevers at home.  Aide uses thickener for fluids but doesn't know if she's aspirating.  Of note, pt had a fall last week on Thursday and hit her head; there is an abrasion on her forehead and L upper arm but per pt, no headache, nausea or changes in vision; no signs of fracture.      ED vitals: Tmax 37.3, HR 72-29, 135-174/51-89, 98% on 2L   CT chest showed b/l moderate pleural effusions w/ bibasilar atelectasis   CT head and cervical neck non-diagnostic 2/2 pt moving 89 yo F w/ PMHx of Afib (on Eliquis), CVA, advanced dementia, Epilepsy, multiple falls, HTN, and COPD ( on PRN 2L O2 at home) who is presenting for hypoxia.  Pt's aide noticed that her SpO2 was 73% on RA and tried giving her a budesonide treatment which the pt did not tolerate and so she brought her the ED for further evaluation.  Pt AAO x 1 at baseline but was answering questions; has a chronic wet cough but per aide and daughter she swallows the sputum so they don't know what color it is; pt herself denies sore throat or rhinorrhea, no fevers at home.  Aide uses thickener for fluids but doesn't know if she's aspirating.  Of note, pt lost her balance on Thursday and hit her head; there is an abrasion on her forehead and L upper arm but per pt, no headache, nausea or changes in vision; no signs of fracture.      ED vitals: Tmax 37.3, HR 72-29, 135-174/51-89, 98% on 2L   CT chest showed b/l moderate pleural effusions w/ bibasilar atelectasis   CT head and cervical neck non-diagnostic 2/2 pt moving

## 2019-07-03 ENCOUNTER — APPOINTMENT (OUTPATIENT)
Dept: PULMONOLOGY | Facility: CLINIC | Age: 84
End: 2019-07-03

## 2019-07-03 ENCOUNTER — APPOINTMENT (OUTPATIENT)
Dept: HOME HEALTH SERVICES | Facility: HOME HEALTH | Age: 84
End: 2019-07-03

## 2019-07-03 DIAGNOSIS — J96.91 RESPIRATORY FAILURE, UNSPECIFIED WITH HYPOXIA: ICD-10-CM

## 2019-07-03 DIAGNOSIS — J06.9 ACUTE UPPER RESPIRATORY INFECTION, UNSPECIFIED: ICD-10-CM

## 2019-07-03 DIAGNOSIS — N39.0 URINARY TRACT INFECTION, SITE NOT SPECIFIED: ICD-10-CM

## 2019-07-03 DIAGNOSIS — R82.71 BACTERIURIA: ICD-10-CM

## 2019-07-03 DIAGNOSIS — I50.9 HEART FAILURE, UNSPECIFIED: ICD-10-CM

## 2019-07-03 LAB
ANION GAP SERPL CALC-SCNC: 17 MMOL/L — SIGNIFICANT CHANGE UP (ref 5–17)
BUN SERPL-MCNC: 47 MG/DL — HIGH (ref 7–23)
CALCIUM SERPL-MCNC: 9 MG/DL — SIGNIFICANT CHANGE UP (ref 8.4–10.5)
CHLORIDE SERPL-SCNC: 104 MMOL/L — SIGNIFICANT CHANGE UP (ref 96–108)
CHOLEST SERPL-MCNC: 172 MG/DL — SIGNIFICANT CHANGE UP (ref 10–199)
CO2 SERPL-SCNC: 24 MMOL/L — SIGNIFICANT CHANGE UP (ref 22–31)
CREAT SERPL-MCNC: 1.91 MG/DL — HIGH (ref 0.5–1.3)
GLUCOSE SERPL-MCNC: 91 MG/DL — SIGNIFICANT CHANGE UP (ref 70–99)
HCT VFR BLD CALC: 28.7 % — LOW (ref 34.5–45)
HDLC SERPL-MCNC: 89 MG/DL — SIGNIFICANT CHANGE UP
HGB BLD-MCNC: 9.4 G/DL — LOW (ref 11.5–15.5)
LIPID PNL WITH DIRECT LDL SERPL: 70 MG/DL — SIGNIFICANT CHANGE UP
MAGNESIUM SERPL-MCNC: 2.4 MG/DL — SIGNIFICANT CHANGE UP (ref 1.6–2.6)
MCHC RBC-ENTMCNC: 27.1 PG — SIGNIFICANT CHANGE UP (ref 27–34)
MCHC RBC-ENTMCNC: 32.6 GM/DL — SIGNIFICANT CHANGE UP (ref 32–36)
MCV RBC AUTO: 83 FL — SIGNIFICANT CHANGE UP (ref 80–100)
PHOSPHATE SERPL-MCNC: 3.5 MG/DL — SIGNIFICANT CHANGE UP (ref 2.5–4.5)
PLATELET # BLD AUTO: 316 K/UL — SIGNIFICANT CHANGE UP (ref 150–400)
POTASSIUM SERPL-MCNC: 4.1 MMOL/L — SIGNIFICANT CHANGE UP (ref 3.5–5.3)
POTASSIUM SERPL-SCNC: 4.1 MMOL/L — SIGNIFICANT CHANGE UP (ref 3.5–5.3)
RBC # BLD: 3.45 M/UL — LOW (ref 3.8–5.2)
RBC # FLD: 15.2 % — HIGH (ref 10.3–14.5)
SODIUM SERPL-SCNC: 145 MMOL/L — SIGNIFICANT CHANGE UP (ref 135–145)
TOTAL CHOLESTEROL/HDL RATIO MEASUREMENT: 1.9 RATIO — LOW (ref 3.3–7.1)
TRIGL SERPL-MCNC: 66 MG/DL — SIGNIFICANT CHANGE UP (ref 10–149)
WBC # BLD: 11.4 K/UL — HIGH (ref 3.8–10.5)
WBC # FLD AUTO: 11.4 K/UL — HIGH (ref 3.8–10.5)

## 2019-07-03 PROCEDURE — 70450 CT HEAD/BRAIN W/O DYE: CPT | Mod: 26

## 2019-07-03 PROCEDURE — 99233 SBSQ HOSP IP/OBS HIGH 50: CPT | Mod: GC

## 2019-07-03 PROCEDURE — 99232 SBSQ HOSP IP/OBS MODERATE 35: CPT

## 2019-07-03 PROCEDURE — 71045 X-RAY EXAM CHEST 1 VIEW: CPT | Mod: 26

## 2019-07-03 RX ORDER — IPRATROPIUM/ALBUTEROL SULFATE 18-103MCG
3 AEROSOL WITH ADAPTER (GRAM) INHALATION EVERY 6 HOURS
Refills: 0 | Status: DISCONTINUED | OUTPATIENT
Start: 2019-07-03 | End: 2019-07-08

## 2019-07-03 RX ORDER — APIXABAN 2.5 MG/1
2.5 TABLET, FILM COATED ORAL
Refills: 0 | Status: DISCONTINUED | OUTPATIENT
Start: 2019-07-03 | End: 2019-07-08

## 2019-07-03 RX ORDER — FUROSEMIDE 40 MG
20 TABLET ORAL DAILY
Refills: 0 | Status: DISCONTINUED | OUTPATIENT
Start: 2019-07-03 | End: 2019-07-04

## 2019-07-03 RX ADMIN — Medication 0.5 MILLIGRAM(S): at 17:29

## 2019-07-03 RX ADMIN — AMIODARONE HYDROCHLORIDE 100 MILLIGRAM(S): 400 TABLET ORAL at 07:38

## 2019-07-03 RX ADMIN — Medication 3 MILLILITER(S): at 17:29

## 2019-07-03 RX ADMIN — Medication 30 MILLIGRAM(S): at 07:38

## 2019-07-03 RX ADMIN — Medication 20 MILLIGRAM(S): at 18:17

## 2019-07-03 RX ADMIN — MONTELUKAST 10 MILLIGRAM(S): 4 TABLET, CHEWABLE ORAL at 12:29

## 2019-07-03 RX ADMIN — Medication 30 MILLIGRAM(S): at 18:16

## 2019-07-03 RX ADMIN — ATORVASTATIN CALCIUM 10 MILLIGRAM(S): 80 TABLET, FILM COATED ORAL at 21:22

## 2019-07-03 RX ADMIN — LEVETIRACETAM 250 MILLIGRAM(S): 250 TABLET, FILM COATED ORAL at 18:16

## 2019-07-03 RX ADMIN — LEVETIRACETAM 250 MILLIGRAM(S): 250 TABLET, FILM COATED ORAL at 07:38

## 2019-07-03 NOTE — PROGRESS NOTE ADULT - PROBLEM SELECTOR PLAN 5
- c/w 100mg amio qd  - will dose 30mg Diltiazem BID in setting of heart failure likely 2/2 valvular dysfunction -- half home dose in setting of decompensated HF.   - not restarting Eliquis until adequate CT head is performed to r/o bleed

## 2019-07-03 NOTE — PATIENT PROFILE ADULT - INSURANCE HELP
Chief Complaint   Patient presents with     RECHECK     Return Neurotology 6 month F/U Pt states no pain today.        N Catarino LEE     no

## 2019-07-03 NOTE — PROGRESS NOTE ADULT - PROBLEM SELECTOR PLAN 10
- DVT ppx: SCD's until head CT completed; if no bleed can restart Eliquis  - Dementia: Asp. precautions  - Diet: dysphagia 1 w/ honey consistency  - GOD: HCP: Her daughter Rachel: # [phone]. Home health aide reported she handed MOLST form to registration, however form is not in her chart. F/U  - Dispo: PT consult - DVT ppx: SCD's until head CT completed; if no bleed can restart Eliquis  - Dementia: Asp. precautions  - Seizure dx: c/w keppra 250 BID  - Diet: dysphagia 1 w/ honey consistency  - GOD: HCP: Her daughter Rachel: # [phone]. Home health aide reported she handed MOLST form to registration, however form is not in her chart. F/U  - Dispo: PT consult

## 2019-07-03 NOTE — PROGRESS NOTE ADULT - SUBJECTIVE AND OBJECTIVE BOX
Feroz Cowan ([place]ie) PGY-1  Pager: [phone]     Patient is a 89y old  Female who presents with a chief complaint of sob (2019 05:46)      SUBJECTIVE / OVERNIGHT EVENTS:  patient had hyperkalemia (5.4) overnight, was 4.6 this AM. She also had trops (122->113) which downtrended this AM. Patient was resting comnfortably on bed.     MEDICATIONS  (STANDING):  ALBUTerol/ipratropium for Nebulization 3 milliLiter(s) Nebulizer every 6 hours  amiodarone    Tablet 100 milliGRAM(s) Oral daily  atorvastatin 10 milliGRAM(s) Oral at bedtime  buDESOnide   0.5 milliGRAM(s) Respule 0.5 milliGRAM(s) Inhalation every 12 hours  diltiazem    Tablet 30 milliGRAM(s) Oral every 12 hours  furosemide   Injectable 20 milliGRAM(s) IV Push daily  levETIRAcetam 250 milliGRAM(s) Oral every 12 hours  montelukast 10 milliGRAM(s) Oral daily    MEDICATIONS  (PRN):  docusate sodium 100 milliGRAM(s) Oral daily PRN Constipation  haloperidol    Injectable 5 milliGRAM(s) IntraMuscular once PRN give before head CT  senna 2 Tablet(s) Oral at bedtime PRN Constipation          OBJECTIVE:  Vital Signs Last 24 Hrs  T(C): 36.5 (2019 14:12), Max: 36.8 (2019 16:53)  T(F): 97.7 (2019 14:12), Max: 98.3 (2019 16:53)  HR: 63 (2019 14:12) (62 - 74)  BP: 122/71 (2019 14:12) (120/81 - 181/53)  BP(mean): --  RR: 18 (2019 14:12) (18 - 20)  SpO2: 97% (2019 14:12) (93% - 98%)  PHYSICAL EXAM:  GENERAL: NAD, well-developed  HEAD:  Atraumatic, Normocephalic  EYES: EOMI, PERRLA, conjunctiva and sclera clear  NECK: Supple, No JVD  CHEST/LUNG: Slight wheeze appreciated bibasilar  HEART: Regular rate and rhythm; rubs, or gallops; holosytoluc murmur on L sternal border  ABDOMEN: Soft, Nontender, Nondistended; Bowel sounds present  EXTREMITIES:  No clubbing, cyanosis, or edema  PSYCH: AAOx1 to time    CAPILLARY BLOOD GLUCOSE        I&O's Summary    2019 07:01  -  2019 16:03  --------------------------------------------------------  IN: 480 mL / OUT: 0 mL / NET: 480 mL              LABS:                        9.4    11.4  )-----------( 316      ( 2019 08:11 )             28.7     07-    145  |  104  |  47<H>  ----------------------------<  91  4.1   |  24  |  1.91<H>    Ca    9.0      2019 08:11  Phos  3.5     07-  Mg     2.4     07-    TPro  7.7  /  Alb  4.0  /  TBili  0.5  /  DBili  0.1  /  AST  42<H>  /  ALT  39  /  AlkPhos  109  07-02    PT/INR - ( 2019 12:42 )   PT: 14.6 sec;   INR: 1.26 ratio         PTT - ( 2019 12:42 )  PTT:37.3 sec      Urinalysis Basic - ( 2019 15:23 )    Color: Light Yellow / Appearance: Clear / S.017 / pH: x  Gluc: x / Ketone: Negative  / Bili: Negative / Urobili: Negative   Blood: x / Protein: 30 mg/dL / Nitrite: Positive   Leuk Esterase: Large / RBC: 1 /hpf / WBC 17 /HPF   Sq Epi: x / Non Sq Epi: 5 /hpf / Bacteria: Many          RADIOLOGY & ADDITIONAL TESTS:

## 2019-07-03 NOTE — PROGRESS NOTE ADULT - PROBLEM SELECTOR PLAN 9
- will start on 1/2 dose of home diltiazem in setting of heart failure likely 2/2 valvular disorder  - if BP > 170 can give IVP 10 hydralazine

## 2019-07-03 NOTE — PROGRESS NOTE ADULT - PROBLEM SELECTOR PLAN 2
Hypoxemia, progressive dyspnea, imaging c/w b/l pleural effusions, adjacent mild compressive atelectasis.

## 2019-07-03 NOTE — PROGRESS NOTE ADULT - PROBLEM SELECTOR PLAN 7
GI prophylaxis:  PPI pre breakfast  aspiration precautions-all meals are to OOB as able  DVT prophlaxis:  subcutaneous lovenox or heparin as per primary team  sequential teds stockings

## 2019-07-03 NOTE — PROGRESS NOTE ADULT - PROBLEM SELECTOR PLAN 1
Hypoxemia, progressive dyspnea, imaging c/w b/l pleural effusions, adjacent mild compressive atelectasis. Upon copd--O2 2 liters NC

## 2019-07-03 NOTE — PROGRESS NOTE ADULT - PROBLEM SELECTOR PLAN 1
Acute on chronic CHF exacerbation 2/2 to URI  - F/u TTE   - f/u pro-BNP;   - c/w 20 IV lasix qd; monitor input and output and daily weights; goal to be neg 500 to 1000ml in setting of aortic stenosis

## 2019-07-03 NOTE — PROGRESS NOTE ADULT - ASSESSMENT
88 yo F former smoker (20 py) with a h/o HTN, HLD, Afib on eliquis, valvular disease (AS, AI, MS, MR), dementia (baseline oriented x 1-2, requires assistance with all ADLS, ambulates with walker) who was BIBEMS for progressive SOB, cough, weakness for the past 5 days.   Had a mechanical fall at home 5 days prior, was ambulating on her own without walker assistance, denies LOC.  Aid states that since last week has been having "cold sweats", rhonchorous sounds in her chest, and dry cough. Using her Oxygen at 2 LNC more frequently (typically PRN)  Denies LE edema, abdominal pain or dysuria.  Hypertensive on arrival, EKG normal sinus rhythm with first degree AV block.   CXR: bibasilar opacities - likely fluid  Labs significant for DIANA on CKD, hyperkalemia to 5.4, mild leukocytosis of 10.7, anemia      A/P: Hypoxemia, progressive dyspnea, imaging c/w b/l pleural effusions, adjacent mild compressive atelectasis. On exam, evidence of JVD, crackles. UA also positive  1. Acute on chronic systolic heart failure, Echo from 11/2017 with mod-severe AS, severe AR and moderate MR  Would send proBNP, cardiac enzymes  -Diurese, cardiac optimization  -Strict ins/outs  -Repeat TTE    2.  UTI, asymptomatic in elderly/dementia -   Send for urine cultures  Would start antibiotics as per primary team    3. Dementia, s/p fall - CT head and c-spine - very limited studies, no gross pathology on CT head.  Fall precautions  Support and frequent orientation    4.  Hypoxemia, cough - Bilateral pleural effusions, no evidence of pneumonia on imaging.  Recommend diuresis  Duonebs, Budesonide nebs  Supplemental O2, goal sats 92-96%  Incentive spirometer and out of bed to chair as able  Acapella device to aid with airway clearance    5. DVT ppx    Thank you for the consult.  Dr. Briones to follow up. 88 yo F former smoker (20 py) with a h/o HTN, HLD, Afib on eliquis, valvular disease (AS, AI, MS, MR), dementia (baseline oriented x 1-2, requires assistance with all ADLS, ambulates with walker) who was BIBEMS for progressive SOB, cough, weakness for the past 5 days.   Had a mechanical fall at home 5 days prior, was ambulating on her own without walker assistance, denies LOC.  Aid states that since last week has been having "cold sweats", rhonchorous sounds in her chest, and dry cough. Using her Oxygen at 2 LNC more frequently (typically PRN)  Denies LE edema, abdominal pain or dysuria.  Hypertensive on arrival, EKG normal sinus rhythm with first degree AV block.   CXR: bibasilar opacities - likely fluid  Labs significant for DIANA on CKD, hyperkalemia to 5.4, mild leukocytosis of 10.7, anemia    A/P: Hypoxemia, progressive dyspnea, imaging c/w b/l pleural effusions, adjacent mild compressive atelectasis.  1. Acute on chronic systolic heart failure, Echo from 11/2017 with mod-severe AS, severe AR and moderate MR  f/up proBNP, cardiac enzymes  -Diurese, cardiac optimization---Strict ins/outs-Repeat TTE  2.  UTI, asymptomatic in elderly/dementia -   Send for urine cultures--- antibiotics as per primary team pending CX  3. Dementia, s/p fall - CT head and c-spine - very limited studies, no gross pathology on CT head.  Fall precautions---Support and frequent orientation  4.  Hypoxemia, cough - Bilateral pleural effusions, no evidence of pneumonia on imaging.  Recommend diuresis  Duonebs, Budesonide nebs  Supplemental O2, goal sats 92-96%--Incentive spirometer and out of bed to chair as able--Acapella device to aid with airway clearance  5. DVT ppx  ******************************  7/3-stable-NC w/O2

## 2019-07-03 NOTE — PROGRESS NOTE ADULT - PROBLEM SELECTOR PLAN 4
Report of mechanical fall per aid  - CT head and cervical neck, non-diagnostic  - repeat head CT to r/o bleed before restarting Eliquis;   - 5mg IV haldol for agitation

## 2019-07-03 NOTE — PROGRESS NOTE ADULT - SUBJECTIVE AND OBJECTIVE BOX
CHIEF COMPLAINT: f/up sob-resp failure, RADS/copd, chf, likely osas--    Interval Events:    REVIEW OF SYSTEMS:  Constitutional: No fevers or chills. No weight loss. No fatigue or generalized malaise.  Eyes: No itching or discharge from the eyes  ENT: No ear pain. No ear discharge. No nasal congestion. No post nasal drip. No epistaxis. No throat pain. No sore throat. No difficulty swallowing.   CV: No chest pain. No palpitations. No lightheadedness or dizziness.   Resp: No dyspnea at rest. No dyspnea on exertion. No orthopnea. No wheezing. No cough. No stridor. No sputum production. No chest pain with respiration.  GI: No nausea. No vomiting. No diarrhea.  MSK: No joint pain or pain in any extremities  Integumentary: No skin lesions. No pedal edema.  Neurological: No gross motor weakness. No sensory changes.  [ ] All other systems negative  [ ] Unable to assess ROS because ________    OBJECTIVE:  ICU Vital Signs Last 24 Hrs  T(C): 36.4 (2019 05:08), Max: 37.3 (2019 12:59)  T(F): 97.6 (2019 05:08), Max: 99.1 (2019 12:59)  HR: 70 (2019 05:08) (62 - 79)  BP: 120/81 (2019 05:08) (120/81 - 181/53)  BP(mean): --  ABP: --  ABP(mean): --  RR: 18 (2019 05:08) (18 - 20)  SpO2: 93% (2019 05:08) (93% - 100%)        CAPILLARY BLOOD GLUCOSE          PHYSICAL EXAM:  General: Awake, alert, oriented X 3.   HEENT: Atraumatic, normocephalic.                 Mallampatti Grade                 No nasal congestion.                No tonsillar or pharyngeal exudates.  Lymph Nodes: No palpable lymphadenopathy  Neck: No JVD. No carotid bruit.   Respiratory: Normal chest expansion                         Normal percussion                         Normal and equal air entry                         No wheeze, rhonchi or rales.  Cardiovascular: S1 S2 normal. No murmurs, rubs or gallops.   Abdomen: Soft, non-tender, non-distended. No organomegaly. Normoactive bowel sounds.  Extremities: Warm to touch. Peripheral pulse palpable. No pedal edema.   Skin: No rashes or skin lesions  Neurological: Motor and sensory examination equal and normal in all four extremities.  Psychiatry: Appropriate mood and affect.    HOSPITAL MEDICATIONS:  MEDICATIONS  (STANDING):  amiodarone    Tablet 100 milliGRAM(s) Oral daily  atorvastatin 10 milliGRAM(s) Oral at bedtime  buDESOnide   0.5 milliGRAM(s) Respule 0.5 milliGRAM(s) Inhalation every 12 hours  diltiazem    Tablet 30 milliGRAM(s) Oral every 12 hours  levETIRAcetam 250 milliGRAM(s) Oral every 12 hours  montelukast 10 milliGRAM(s) Oral daily    MEDICATIONS  (PRN):  docusate sodium 100 milliGRAM(s) Oral daily PRN Constipation  haloperidol    Injectable 5 milliGRAM(s) IntraMuscular once PRN give before head CT  senna 2 Tablet(s) Oral at bedtime PRN Constipation      LABS:                        10.6   10.7  )-----------( 321      ( 2019 12:42 )             32.4     07-02    145  |  104  |  51<H>  ----------------------------<  124<H>  4.6   |  24  |  1.99<H>    Ca    9.2      2019 20:01    TPro  7.7  /  Alb  4.0  /  TBili  0.5  /  DBili  0.1  /  AST  42<H>  /  ALT  39  /  AlkPhos  109  0702    PT/INR - ( 2019 12:42 )   PT: 14.6 sec;   INR: 1.26 ratio         PTT - ( 2019 12:42 )  PTT:37.3 sec  Urinalysis Basic - ( 2019 15:23 )    Color: Light Yellow / Appearance: Clear / S.017 / pH: x  Gluc: x / Ketone: Negative  / Bili: Negative / Urobili: Negative   Blood: x / Protein: 30 mg/dL / Nitrite: Positive   Leuk Esterase: Large / RBC: 1 /hpf / WBC 17 /HPF   Sq Epi: x / Non Sq Epi: 5 /hpf / Bacteria: Many        Venous Blood Gas:   @ 12:42  7.36/46/23/26/30  VBG Lactate: 1.2      MICROBIOLOGY:     RADIOLOGY:  [ ] Reviewed and interpreted by me    Point of Care Ultrasound Findings:    PFT:    EKG: CHIEF COMPLAINT: f/up sob-resp failure, RADS/copd, chf, likely osas--no changes as per pt (not oriented)--denies all sx    Interval Events: none    REVIEW OF SYSTEMS:  Constitutional: No fevers or chills. No weight loss. No fatigue or generalized malaise.  Eyes: No itching or discharge from the eyes  ENT: No ear pain. No ear discharge. No nasal congestion. No post nasal drip. No epistaxis. No throat pain. No sore throat. No difficulty swallowing.   CV: No chest pain. No palpitations. No lightheadedness or dizziness.   Resp: No dyspnea at rest. No dyspnea on exertion. No orthopnea. No wheezing. No cough. No stridor. No sputum production. No chest pain with respiration.  GI: No nausea. No vomiting. No diarrhea.  MSK: No joint pain or pain in any extremities  Integumentary: No skin lesions. No pedal edema.  Neurological: No gross motor weakness. No sensory changes.  [ ] All other systems negative  [+ ] Unable to assess ROS because disoriented________    OBJECTIVE:  ICU Vital Signs Last 24 Hrs  T(C): 36.4 (2019 05:08), Max: 37.3 (2019 12:59)  T(F): 97.6 (2019 05:08), Max: 99.1 (2019 12:59)  HR: 70 (2019 05:08) (62 - 79)  BP: 120/81 (2019 05:08) (120/81 - 181/53)  BP(mean): --  ABP: --  ABP(mean): --  RR: 18 (2019 05:08) (18 - 20)  SpO2: 93% (2019 05:08) (93% - 100%)        CAPILLARY BLOOD GLUCOSE          PHYSICAL EXAM: NAD on RA  General: Awake, alert, oriented X 3.   HEENT: Atraumatic, normocephalic.                 Mallampatti Grade 3                No nasal congestion.                No tonsillar or pharyngeal exudates.  Lymph Nodes: No palpable lymphadenopathy  Neck: No JVD. No carotid bruit.   Respiratory: Normal chest expansion                         Normal percussion                         Normal and equal air entry                         No wheeze, rhonchi or rales.  Cardiovascular: S1 S2 normal. + murmurs, rubs or gallops.   Abdomen: Soft, non-tender, non-distended. No organomegaly. Normoactive bowel sounds.  Extremities: Warm to touch. Peripheral pulse palpable. No pedal edema.   Skin: No rashes or skin lesions  Neurological: Motor and sensory examination equal and normal in all four extremities.  Psychiatry: Appropriate mood and affect.    HOSPITAL MEDICATIONS:  MEDICATIONS  (STANDING):  amiodarone    Tablet 100 milliGRAM(s) Oral daily  atorvastatin 10 milliGRAM(s) Oral at bedtime  buDESOnide   0.5 milliGRAM(s) Respule 0.5 milliGRAM(s) Inhalation every 12 hours  diltiazem    Tablet 30 milliGRAM(s) Oral every 12 hours  levETIRAcetam 250 milliGRAM(s) Oral every 12 hours  montelukast 10 milliGRAM(s) Oral daily    MEDICATIONS  (PRN):  docusate sodium 100 milliGRAM(s) Oral daily PRN Constipation  haloperidol    Injectable 5 milliGRAM(s) IntraMuscular once PRN give before head CT  senna 2 Tablet(s) Oral at bedtime PRN Constipation      LABS:                        10.6   10.7  )-----------( 321      ( 2019 12:42 )             32.4     07-02    145  |  104  |  51<H>  ----------------------------<  124<H>  4.6   |  24  |  1.99<H>    Ca    9.2      2019 20:01    TPro  7.7  /  Alb  4.0  /  TBili  0.5  /  DBili  0.1  /  AST  42<H>  /  ALT  39  /  AlkPhos  109  07-02    PT/INR - ( 2019 12:42 )   PT: 14.6 sec;   INR: 1.26 ratio         PTT - ( 2019 12:42 )  PTT:37.3 sec  Urinalysis Basic - ( 2019 15:23 )    Color: Light Yellow / Appearance: Clear / S.017 / pH: x  Gluc: x / Ketone: Negative  / Bili: Negative / Urobili: Negative   Blood: x / Protein: 30 mg/dL / Nitrite: Positive   Leuk Esterase: Large / RBC: 1 /hpf / WBC 17 /HPF   Sq Epi: x / Non Sq Epi: 5 /hpf / Bacteria: Many        Venous Blood Gas:   @ 12:42  7.36/46/23//30  VBG Lactate: 1.2      MICROBIOLOGY:     RADIOLOGY:  [ ] Reviewed and interpreted by me    Point of Care Ultrasound Findings:    PFT:    EKG:

## 2019-07-03 NOTE — PROGRESS NOTE ADULT - ASSESSMENT
89 yo F w/ PMHx of Afib (on Eliquis), CVA, advanced dementia, Epilepsy, multiple falls, HTN, and COPD (on PRN 2L O2 at home) p.w for hypoxia found to have acute respiratory failure 2/2 to CHF exacerbation vs COPD exacerbation 2/2 to URI.

## 2019-07-03 NOTE — PROGRESS NOTE ADULT - PROBLEM SELECTOR PLAN 3
Acute renal failure   - DIANA on CKD stage 4. Cr downtrending.    - Strict I's & O's  - Nephro consult if worsening  - f/u urine lytes  - avoid nephrotoxic agents; no ACE/ARB

## 2019-07-04 LAB
-  AMIKACIN: SIGNIFICANT CHANGE UP
-  AMPICILLIN/SULBACTAM: SIGNIFICANT CHANGE UP
-  AMPICILLIN: SIGNIFICANT CHANGE UP
-  AZTREONAM: SIGNIFICANT CHANGE UP
-  CEFEPIME: SIGNIFICANT CHANGE UP
-  CEFOXITIN: SIGNIFICANT CHANGE UP
-  CEFTRIAXONE: SIGNIFICANT CHANGE UP
-  CIPROFLOXACIN: SIGNIFICANT CHANGE UP
-  ERTAPENEM: SIGNIFICANT CHANGE UP
-  GENTAMICIN: SIGNIFICANT CHANGE UP
-  IMIPENEM: SIGNIFICANT CHANGE UP
-  LEVOFLOXACIN: SIGNIFICANT CHANGE UP
-  MEROPENEM: SIGNIFICANT CHANGE UP
-  NITROFURANTOIN: SIGNIFICANT CHANGE UP
-  PIPERACILLIN/TAZOBACTAM: SIGNIFICANT CHANGE UP
-  TIGECYCLINE: SIGNIFICANT CHANGE UP
-  TOBRAMYCIN: SIGNIFICANT CHANGE UP
-  TRIMETHOPRIM/SULFAMETHOXAZOLE: SIGNIFICANT CHANGE UP
ANION GAP SERPL CALC-SCNC: 13 MMOL/L — SIGNIFICANT CHANGE UP (ref 5–17)
BUN SERPL-MCNC: 55 MG/DL — HIGH (ref 7–23)
CALCIUM SERPL-MCNC: 8.7 MG/DL — SIGNIFICANT CHANGE UP (ref 8.4–10.5)
CHLORIDE SERPL-SCNC: 100 MMOL/L — SIGNIFICANT CHANGE UP (ref 96–108)
CO2 SERPL-SCNC: 26 MMOL/L — SIGNIFICANT CHANGE UP (ref 22–31)
CREAT SERPL-MCNC: 2.18 MG/DL — HIGH (ref 0.5–1.3)
CULTURE RESULTS: SIGNIFICANT CHANGE UP
GLUCOSE SERPL-MCNC: 132 MG/DL — HIGH (ref 70–99)
HBA1C BLD-MCNC: 5.6 % — SIGNIFICANT CHANGE UP (ref 4–5.6)
HCT VFR BLD CALC: 28.5 % — LOW (ref 34.5–45)
HGB BLD-MCNC: 9.3 G/DL — LOW (ref 11.5–15.5)
MAGNESIUM SERPL-MCNC: 2.2 MG/DL — SIGNIFICANT CHANGE UP (ref 1.6–2.6)
MCHC RBC-ENTMCNC: 27 PG — SIGNIFICANT CHANGE UP (ref 27–34)
MCHC RBC-ENTMCNC: 32.6 GM/DL — SIGNIFICANT CHANGE UP (ref 32–36)
MCV RBC AUTO: 82.8 FL — SIGNIFICANT CHANGE UP (ref 80–100)
METHOD TYPE: SIGNIFICANT CHANGE UP
NT-PROBNP SERPL-SCNC: 8708 PG/ML — HIGH (ref 0–300)
ORGANISM # SPEC MICROSCOPIC CNT: SIGNIFICANT CHANGE UP
ORGANISM # SPEC MICROSCOPIC CNT: SIGNIFICANT CHANGE UP
PHOSPHATE SERPL-MCNC: 3.9 MG/DL — SIGNIFICANT CHANGE UP (ref 2.5–4.5)
PLATELET # BLD AUTO: 325 K/UL — SIGNIFICANT CHANGE UP (ref 150–400)
POTASSIUM SERPL-MCNC: 4.1 MMOL/L — SIGNIFICANT CHANGE UP (ref 3.5–5.3)
POTASSIUM SERPL-SCNC: 4.1 MMOL/L — SIGNIFICANT CHANGE UP (ref 3.5–5.3)
RBC # BLD: 3.44 M/UL — LOW (ref 3.8–5.2)
RBC # FLD: 15.3 % — HIGH (ref 10.3–14.5)
SODIUM SERPL-SCNC: 139 MMOL/L — SIGNIFICANT CHANGE UP (ref 135–145)
SPECIMEN SOURCE: SIGNIFICANT CHANGE UP
WBC # BLD: 11.7 K/UL — HIGH (ref 3.8–10.5)
WBC # FLD AUTO: 11.7 K/UL — HIGH (ref 3.8–10.5)

## 2019-07-04 PROCEDURE — 99233 SBSQ HOSP IP/OBS HIGH 50: CPT

## 2019-07-04 RX ORDER — FUROSEMIDE 40 MG
10 TABLET ORAL DAILY
Refills: 0 | Status: DISCONTINUED | OUTPATIENT
Start: 2019-07-04 | End: 2019-07-04

## 2019-07-04 RX ORDER — CEFTRIAXONE 500 MG/1
1000 INJECTION, POWDER, FOR SOLUTION INTRAMUSCULAR; INTRAVENOUS EVERY 24 HOURS
Refills: 0 | Status: COMPLETED | OUTPATIENT
Start: 2019-07-04 | End: 2019-07-08

## 2019-07-04 RX ORDER — FUROSEMIDE 40 MG
20 TABLET ORAL
Refills: 0 | Status: DISCONTINUED | OUTPATIENT
Start: 2019-07-04 | End: 2019-07-05

## 2019-07-04 RX ADMIN — Medication 3 MILLILITER(S): at 00:05

## 2019-07-04 RX ADMIN — Medication 3 MILLILITER(S): at 17:13

## 2019-07-04 RX ADMIN — LEVETIRACETAM 250 MILLIGRAM(S): 250 TABLET, FILM COATED ORAL at 05:32

## 2019-07-04 RX ADMIN — Medication 30 MILLIGRAM(S): at 18:49

## 2019-07-04 RX ADMIN — ATORVASTATIN CALCIUM 10 MILLIGRAM(S): 80 TABLET, FILM COATED ORAL at 22:20

## 2019-07-04 RX ADMIN — Medication 0.5 MILLIGRAM(S): at 17:13

## 2019-07-04 RX ADMIN — AMIODARONE HYDROCHLORIDE 100 MILLIGRAM(S): 400 TABLET ORAL at 05:32

## 2019-07-04 RX ADMIN — MONTELUKAST 10 MILLIGRAM(S): 4 TABLET, CHEWABLE ORAL at 13:20

## 2019-07-04 RX ADMIN — LEVETIRACETAM 250 MILLIGRAM(S): 250 TABLET, FILM COATED ORAL at 18:51

## 2019-07-04 RX ADMIN — CEFTRIAXONE 100 MILLIGRAM(S): 500 INJECTION, POWDER, FOR SOLUTION INTRAMUSCULAR; INTRAVENOUS at 18:48

## 2019-07-04 RX ADMIN — Medication 30 MILLIGRAM(S): at 05:32

## 2019-07-04 RX ADMIN — Medication 3 MILLILITER(S): at 05:19

## 2019-07-04 RX ADMIN — Medication 3 MILLILITER(S): at 12:55

## 2019-07-04 RX ADMIN — Medication 10 MILLIGRAM(S): at 13:20

## 2019-07-04 RX ADMIN — APIXABAN 2.5 MILLIGRAM(S): 2.5 TABLET, FILM COATED ORAL at 05:32

## 2019-07-04 RX ADMIN — APIXABAN 2.5 MILLIGRAM(S): 2.5 TABLET, FILM COATED ORAL at 18:49

## 2019-07-04 RX ADMIN — Medication 0.5 MILLIGRAM(S): at 05:19

## 2019-07-04 RX ADMIN — Medication 20 MILLIGRAM(S): at 05:57

## 2019-07-04 NOTE — PROGRESS NOTE ADULT - PROBLEM SELECTOR PLAN 4
Report of mechanical fall per aid  - CT head and cervical neck, non-diagnostic  - repeat head CT to r/o bleed before restarting Eliquis;   - 5mg IV haldol for agitation Report of mechanical fall per aid  - CT head and cervical neck, non-diagnostic  - head CT to r/o bleed before restarting Eliquis; head CT neg. restarted eliquis  - 5mg IV haldol for agitation

## 2019-07-04 NOTE — PROGRESS NOTE ADULT - PROBLEM SELECTOR PLAN 10
- DVT ppx: SCD's until head CT completed; if no bleed can restart Eliquis  - Dementia: Asp. precautions  - Seizure dx: c/w keppra 250 BID  - Diet: dysphagia 1 w/ honey consistency  - GOD: HCP: Her daughter Rachel: # [phone]. Home health aide reported she handed MOLST form to registration, however form is not in her chart. F/U  - Dispo: PT consult - DVT ppx: SCD's until head CT completed; if no bleed can restart Eliquis  - Dementia: Asp. precautions  - Seizure dx: c/w keppra 250 BID  - Diet: dysphagia 1 w/ honey consistency  - GOD: HCP: Her daughter Rachel: # [phone]. MOLST form signed 7/3  - Dispo: PT consult

## 2019-07-04 NOTE — PROGRESS NOTE ADULT - PROBLEM SELECTOR PLAN 3
Acute renal failure   - DIANA on CKD stage 4. Cr downtrending.    - Strict I's & O's  - Nephro consult if worsening  - f/u urine lytes  - avoid nephrotoxic agents; no ACE/ARB Acute renal failure   - DIANA on CKD stage 4.  - Strict I's & O's  - Nephro consult if worsening  - f/u urine lytes  - avoid nephrotoxic agents; no ACE/ARB

## 2019-07-04 NOTE — PROGRESS NOTE ADULT - ASSESSMENT
88 yo F w/ PMHx of Afib (on Eliquis), CVA, advanced dementia, Epilepsy, multiple falls, HTN, and COPD (on PRN 2L O2 at home) p.w for hypoxia found to have acute respiratory failure 2/2 to CHF exacerbation vs COPD exacerbation 2/2 to URI.

## 2019-07-04 NOTE — PROGRESS NOTE ADULT - SUBJECTIVE AND OBJECTIVE BOX
Feroz Cowan (Willie) PGY-1  Pager: [phone]     Patient is a 89y old  Female who presents with a chief complaint of Hypoxia (2019 09:16)      SUBJECTIVE / OVERNIGHT EVENTS:  No acute complaints over night. Denies any fevers/chills, headache, CP, SOB, abd pain, N/V/D, constipation, or leg swelling.       MEDICATIONS  (STANDING):  ALBUTerol/ipratropium for Nebulization 3 milliLiter(s) Nebulizer every 6 hours  amiodarone    Tablet 100 milliGRAM(s) Oral daily  apixaban 2.5 milliGRAM(s) Oral two times a day  atorvastatin 10 milliGRAM(s) Oral at bedtime  buDESOnide   0.5 milliGRAM(s) Respule 0.5 milliGRAM(s) Inhalation every 12 hours  diltiazem    Tablet 30 milliGRAM(s) Oral every 12 hours  furosemide   Injectable 20 milliGRAM(s) IV Push daily  levETIRAcetam 250 milliGRAM(s) Oral every 12 hours  montelukast 10 milliGRAM(s) Oral daily    MEDICATIONS  (PRN):  docusate sodium 100 milliGRAM(s) Oral daily PRN Constipation  haloperidol    Injectable 5 milliGRAM(s) IntraMuscular once PRN give before head CT  senna 2 Tablet(s) Oral at bedtime PRN Constipation          OBJECTIVE:  Vital Signs Last 24 Hrs  T(C): 36.9 (2019 04:58), Max: 36.9 (2019 04:58)  T(F): 98.4 (2019 04:58), Max: 98.4 (2019 04:58)  HR: 66 (2019 05:23) (60 - 78)  BP: 166/72 (2019 04:58) (122/71 - 166/72)  BP(mean): --  RR: 18 (2019 04:58) (18 - 18)  SpO2: 100% (2019 05:23) (96% - 100%)  PHYSICAL EXAM:  GENERAL: NAD, well-developed  HEAD:  Atraumatic, Normocephalic  EYES: EOMI, PERRLA, conjunctiva and sclera clear  NECK: Supple, No JVD  CHEST/LUNG: Clear to auscultation bilaterally; No wheeze  HEART: Regular rate and rhythm; No murmurs, rubs, or gallops  ABDOMEN: Soft, Nontender, Nondistended; Bowel sounds present  EXTREMITIES:  2+ Peripheral Pulses, No clubbing, cyanosis, or edema  PSYCH: AAOx3  NEUROLOGY: non-focal  SKIN: No rashes or lesions    CAPILLARY BLOOD GLUCOSE        I&O's Summary    2019 07:01  -  2019 07:00  --------------------------------------------------------  IN: 770 mL / OUT: 0 mL / NET: 770 mL              LABS:                        9.3    11.7  )-----------( 325      ( 2019 06:33 )             28.5     07-04    139  |  100  |  55<H>  ----------------------------<  132<H>  4.1   |  26  |  2.18<H>    Ca    8.7      2019 06:33  Phos  3.9     07-04  Mg     2.2     07-04    TPro  7.7  /  Alb  4.0  /  TBili  0.5  /  DBili  0.1  /  AST  42<H>  /  ALT  39  /  AlkPhos  109  07-02    PT/INR - ( 2019 12:42 )   PT: 14.6 sec;   INR: 1.26 ratio         PTT - ( 2019 12:42 )  PTT:37.3 sec      Urinalysis Basic - ( 2019 15:23 )    Color: Light Yellow / Appearance: Clear / S.017 / pH: x  Gluc: x / Ketone: Negative  / Bili: Negative / Urobili: Negative   Blood: x / Protein: 30 mg/dL / Nitrite: Positive   Leuk Esterase: Large / RBC: 1 /hpf / WBC 17 /HPF   Sq Epi: x / Non Sq Epi: 5 /hpf / Bacteria: Many          RADIOLOGY & ADDITIONAL TESTS: Feroz Cowan (Willie) PGY-1  Pager: [phone]     Patient is a 89y old  Female who presents with a chief complaint of Hypoxia (2019 09:16)      SUBJECTIVE / OVERNIGHT EVENTS:  No acute complaints over night. Denies any fevers/chills, headache, CP, SOB, abd pain, N/V/D, constipation, or leg swelling. Patient is more alert today.       MEDICATIONS  (STANDING):  ALBUTerol/ipratropium for Nebulization 3 milliLiter(s) Nebulizer every 6 hours  amiodarone    Tablet 100 milliGRAM(s) Oral daily  apixaban 2.5 milliGRAM(s) Oral two times a day  atorvastatin 10 milliGRAM(s) Oral at bedtime  buDESOnide   0.5 milliGRAM(s) Respule 0.5 milliGRAM(s) Inhalation every 12 hours  diltiazem    Tablet 30 milliGRAM(s) Oral every 12 hours  furosemide   Injectable 20 milliGRAM(s) IV Push daily  levETIRAcetam 250 milliGRAM(s) Oral every 12 hours  montelukast 10 milliGRAM(s) Oral daily    MEDICATIONS  (PRN):  docusate sodium 100 milliGRAM(s) Oral daily PRN Constipation  haloperidol    Injectable 5 milliGRAM(s) IntraMuscular once PRN give before head CT  senna 2 Tablet(s) Oral at bedtime PRN Constipation          OBJECTIVE:  Vital Signs Last 24 Hrs  T(C): 36.9 (2019 04:58), Max: 36.9 (2019 04:58)  T(F): 98.4 (2019 04:58), Max: 98.4 (2019 04:58)  HR: 66 (2019 05:23) (60 - 78)  BP: 166/72 (2019 04:58) (122/71 - 166/72)  BP(mean): --  RR: 18 (2019 04:58) (18 - 18)  SpO2: 100% (2019 05:23) (96% - 100%)  PHYSICAL EXAM:  GENERAL: NAD, well-developed  HEAD:  Atraumatic, Normocephalic  EYES: EOMI, PERRLA, conjunctiva and sclera clear  NECK: Supple, No JVD  CHEST/LUNG: Slight wheeze appreciated bibasilar  HEART: Regular rate and rhythm; rubs, or gallops; holosytoluc murmur on L sternal border  ABDOMEN: Soft, Nontender, Nondistended; Bowel sounds present  EXTREMITIES:  No clubbing, cyanosis, or edema      CAPILLARY BLOOD GLUCOSE        I&O's Summary    2019 07:01  -  2019 07:00  --------------------------------------------------------  IN: 770 mL / OUT: 0 mL / NET: 770 mL              LABS:                        9.3    11.7  )-----------( 325      ( 2019 06:33 )             28.5     07-04    139  |  100  |  55<H>  ----------------------------<  132<H>  4.1   |  26  |  2.18<H>    Ca    8.7      2019 06:33  Phos  3.9     07-  Mg     2.2     07-04    TPro  7.7  /  Alb  4.0  /  TBili  0.5  /  DBili  0.1  /  AST  42<H>  /  ALT  39  /  AlkPhos  109  07-02    PT/INR - ( 2019 12:42 )   PT: 14.6 sec;   INR: 1.26 ratio         PTT - ( 2019 12:42 )  PTT:37.3 sec      Urinalysis Basic - ( 2019 15:23 )    Color: Light Yellow / Appearance: Clear / S.017 / pH: x  Gluc: x / Ketone: Negative  / Bili: Negative / Urobili: Negative   Blood: x / Protein: 30 mg/dL / Nitrite: Positive   Leuk Esterase: Large / RBC: 1 /hpf / WBC 17 /HPF   Sq Epi: x / Non Sq Epi: 5 /hpf / Bacteria: Many          RADIOLOGY & ADDITIONAL TESTS:

## 2019-07-04 NOTE — PROGRESS NOTE ADULT - PROBLEM SELECTOR PLAN 1
Acute on chronic CHF exacerbation 2/2 to URI  - F/u TTE   - f/u pro-BNP;   - c/w 20 IV lasix qd; monitor input and output and daily weights; goal to be neg 500 to 1000ml in setting of aortic stenosis Acute on chronic CHF exacerbation 2/2 to URI  - F/u TTE   - f/u pro-BNP; 8708  - c/w 20 IV lasix qd; monitor input and output and daily weights; goal to be neg 500 to 1000ml in setting of aortic stenosis (incontinence overnight) but as per nurse she had good output Acute on chronic CHF exacerbation 2/2 to URI  - F/u TTE   - f/u pro-BNP; 2843  - resume home lasix dosing as pt now euvolemic to possibly dry given uptrending Cr

## 2019-07-05 ENCOUNTER — TRANSCRIPTION ENCOUNTER (OUTPATIENT)
Age: 84
End: 2019-07-05

## 2019-07-05 LAB
ANION GAP SERPL CALC-SCNC: 15 MMOL/L — SIGNIFICANT CHANGE UP (ref 5–17)
BUN SERPL-MCNC: 64 MG/DL — HIGH (ref 7–23)
CALCIUM SERPL-MCNC: 8.2 MG/DL — LOW (ref 8.4–10.5)
CHLORIDE SERPL-SCNC: 100 MMOL/L — SIGNIFICANT CHANGE UP (ref 96–108)
CO2 SERPL-SCNC: 24 MMOL/L — SIGNIFICANT CHANGE UP (ref 22–31)
CREAT SERPL-MCNC: 2.25 MG/DL — HIGH (ref 0.5–1.3)
GLUCOSE SERPL-MCNC: 103 MG/DL — HIGH (ref 70–99)
HCT VFR BLD CALC: 30 % — LOW (ref 34.5–45)
HGB BLD-MCNC: 9.6 G/DL — LOW (ref 11.5–15.5)
MAGNESIUM SERPL-MCNC: 2.1 MG/DL — SIGNIFICANT CHANGE UP (ref 1.6–2.6)
MCHC RBC-ENTMCNC: 26.5 PG — LOW (ref 27–34)
MCHC RBC-ENTMCNC: 31.9 GM/DL — LOW (ref 32–36)
MCV RBC AUTO: 83 FL — SIGNIFICANT CHANGE UP (ref 80–100)
NT-PROBNP SERPL-SCNC: 5338 PG/ML — HIGH (ref 0–300)
PHOSPHATE SERPL-MCNC: 4.2 MG/DL — SIGNIFICANT CHANGE UP (ref 2.5–4.5)
PLATELET # BLD AUTO: 324 K/UL — SIGNIFICANT CHANGE UP (ref 150–400)
POTASSIUM SERPL-MCNC: 4.1 MMOL/L — SIGNIFICANT CHANGE UP (ref 3.5–5.3)
POTASSIUM SERPL-SCNC: 4.1 MMOL/L — SIGNIFICANT CHANGE UP (ref 3.5–5.3)
RBC # BLD: 3.61 M/UL — LOW (ref 3.8–5.2)
RBC # FLD: 15.2 % — HIGH (ref 10.3–14.5)
SODIUM SERPL-SCNC: 139 MMOL/L — SIGNIFICANT CHANGE UP (ref 135–145)
WBC # BLD: 11 K/UL — HIGH (ref 3.8–10.5)
WBC # FLD AUTO: 11 K/UL — HIGH (ref 3.8–10.5)

## 2019-07-05 PROCEDURE — 99233 SBSQ HOSP IP/OBS HIGH 50: CPT

## 2019-07-05 PROCEDURE — 94010 BREATHING CAPACITY TEST: CPT | Mod: 26

## 2019-07-05 PROCEDURE — 71045 X-RAY EXAM CHEST 1 VIEW: CPT | Mod: 26

## 2019-07-05 RX ADMIN — ATORVASTATIN CALCIUM 10 MILLIGRAM(S): 80 TABLET, FILM COATED ORAL at 21:33

## 2019-07-05 RX ADMIN — Medication 3 MILLILITER(S): at 17:20

## 2019-07-05 RX ADMIN — Medication 20 MILLIGRAM(S): at 06:07

## 2019-07-05 RX ADMIN — Medication 30 MILLIGRAM(S): at 18:27

## 2019-07-05 RX ADMIN — Medication 3 MILLILITER(S): at 23:41

## 2019-07-05 RX ADMIN — Medication 0.5 MILLIGRAM(S): at 06:46

## 2019-07-05 RX ADMIN — APIXABAN 2.5 MILLIGRAM(S): 2.5 TABLET, FILM COATED ORAL at 06:06

## 2019-07-05 RX ADMIN — LEVETIRACETAM 250 MILLIGRAM(S): 250 TABLET, FILM COATED ORAL at 18:27

## 2019-07-05 RX ADMIN — Medication 30 MILLIGRAM(S): at 06:06

## 2019-07-05 RX ADMIN — Medication 3 MILLILITER(S): at 06:46

## 2019-07-05 RX ADMIN — CEFTRIAXONE 100 MILLIGRAM(S): 500 INJECTION, POWDER, FOR SOLUTION INTRAMUSCULAR; INTRAVENOUS at 18:27

## 2019-07-05 RX ADMIN — Medication 3 MILLILITER(S): at 11:27

## 2019-07-05 RX ADMIN — AMIODARONE HYDROCHLORIDE 100 MILLIGRAM(S): 400 TABLET ORAL at 06:07

## 2019-07-05 RX ADMIN — Medication 0.5 MILLIGRAM(S): at 17:20

## 2019-07-05 RX ADMIN — LEVETIRACETAM 250 MILLIGRAM(S): 250 TABLET, FILM COATED ORAL at 06:07

## 2019-07-05 RX ADMIN — APIXABAN 2.5 MILLIGRAM(S): 2.5 TABLET, FILM COATED ORAL at 18:27

## 2019-07-05 RX ADMIN — MONTELUKAST 10 MILLIGRAM(S): 4 TABLET, CHEWABLE ORAL at 12:15

## 2019-07-05 RX ADMIN — Medication 3 MILLILITER(S): at 00:10

## 2019-07-05 NOTE — DISCHARGE NOTE PROVIDER - CARE PROVIDER_API CALL
Chi Martinez)  Med  [facility]  [address], [address]  [place] [address]  Phone: [phone]  Fax: [phone]  Follow Up Time:

## 2019-07-05 NOTE — SWALLOW BEDSIDE ASSESSMENT ADULT - PHARYNGEAL PHASE
Delayed pharyngeal swallow/At times required verbal cues to initiate trigger of pharyngeal swallow Delayed pharyngeal swallow/Throat clear post oral intake/suspected incoordination in the swallow sequence At times required verbal cues to initiate trigger of pharyngeal swallow; audible swallow/Delayed pharyngeal swallow

## 2019-07-05 NOTE — SWALLOW BEDSIDE ASSESSMENT ADULT - SLP PERTINENT HISTORY OF CURRENT PROBLEM
89 yo F w/ PMHx of Afib (on Eliquis), CVA, advanced dementia, Epilepsy, multiple falls, HTN, and COPD ( on PRN 2L O2 at home) who is presenting for hypoxia.  Pt's aide noticed that her SpO2 was 73% on RA and tried giving her a budesonide treatment which the pt did not tolerate and so she brought her the ED for further evaluation.  Pt AAO x 1 at baseline but was answering questions; has a chronic wet cough but per aide and daughter she swallows the sputum so they don't know what color it is; pt herself denies sore throat or rhinorrhea, no fevers at home.  Aide uses thickener for fluids but doesn't know if she's aspirating.  Of note, pt lost her balance on Thursday and hit her head; there is an abrasion on her forehead and L upper arm but per pt, no headache, nausea or changes in vision; no signs of fracture.  ED vitals: Tmax 37.3, HR 72-29, 135-174/51-89, 98% on 2L. CT chest showed b/l moderate pleural effusions w/ bibasilar atelectasis.

## 2019-07-05 NOTE — PROGRESS NOTE ADULT - PROBLEM SELECTOR PLAN 10
- DVT ppx: SCD's until head CT completed; if no bleed can restart Eliquis  - Dementia: Asp. precautions  - Seizure dx: c/w keppra 250 BID  - Diet: dysphagia 1 w/ honey consistency  - GOD: HCP: Her daughter Rachel: # [phone]. MOLST form signed 7/3  - Dispo: PT consult - DVT ppx: Eliquis 2.5 mg BID  - Dementia: Asp. precautions  - Diet: dysphagia 1 w/ honey consistency. speech & swallow recommended PO via teaspoon  - Seizure dx: c/w keppra 250 BID  - GOD: HCP: Her daughter Rachel: # [phone]. MOLST form signed 7/5  - Dispo: Home with home PT

## 2019-07-05 NOTE — DISCHARGE NOTE PROVIDER - HOSPITAL COURSE
History of Present Illness:     89 yo F w/ PMHx of Afib (on Eliquis), CVA, advanced dementia, Epilepsy, multiple falls, HTN, and COPD ( on PRN 2L O2 at home) who is presenting for hypoxia.  Pt's aide noticed that her SpO2 was 73% on RA and tried giving her a budesonide treatment which the pt did not tolerate and so she brought her the ED for further evaluation.  Pt AAO x 1 at baseline but was answering questions; has a chronic wet cough but per aide and daughter she swallows the sputum so they don't know what color it is; pt herself denies sore throat or rhinorrhea, no fevers at home.  Aide uses thickener for fluids but doesn't know if she's aspirating.  Of note, pt lost her balance on Thursday and hit her head; there is an abrasion on her forehead and L upper arm but per pt, no headache, nausea or changes in vision; no signs of fracture.          ED vitals: Tmax 37.3, HR 72-29, 135-174/51-89, 98% on 2L     CT chest showed b/l moderate pleural effusions w/ bibasilar atelectasis     CT head and cervical neck non-diagnostic 2/2 pt moving         Hospital Course:    Patient was found to have CHF exacerbation 2/2 to URI w/ elevated pro-BNP. Patient was given lasix with improvement. However, her Cr increased and subsequent CXR did not show improvement from her initial CXR on presentation. PT recommended home with home PT. History of Present Illness:     88 yo F w/ PMHx of Afib (on Eliquis), CVA, advanced dementia, Epilepsy, multiple falls, HTN, and COPD ( on PRN 2L O2 at home) who is presenting for hypoxia.  Pt's aide noticed that her SpO2 was 73% on RA and tried giving her a budesonide treatment which the pt did not tolerate and so she brought her the ED for further evaluation.  Pt AAO x 1 at baseline but was answering questions; has a chronic wet cough but per aide and daughter she swallows the sputum so they don't know what color it is; pt herself denies sore throat or rhinorrhea, no fevers at home.  Aide uses thickener for fluids but doesn't know if she's aspirating.  Of note, pt lost her balance on Thursday and hit her head; there is an abrasion on her forehead and L upper arm but per pt, no headache, nausea or changes in vision; no signs of fracture.          ED vitals: Tmax 37.3, HR 72-29, 135-174/51-89, 98% on 2L     CT chest showed b/l moderate pleural effusions w/ bibasilar atelectasis     CT head and cervical neck non-diagnostic 2/2 pt moving         Hospital Course:    Patient was found to have CHF exacerbation 2/2 to URI w/ elevated pro-BNP. Patient was given lasix with improvement. However, her Cr increased and subsequent CXR did not show improvement from her initial CXR on presentation. The urine culture also grew kelbsiella, & she was treated with CTX. PT recommended home with home PT. History of Present Illness:     88 yo F w/ PMHx of Afib (on Eliquis), CVA, advanced dementia, Epilepsy, multiple falls, HTN, and COPD ( on PRN 2L O2 at home) who is presenting for hypoxia.  Pt's aide noticed that her SpO2 was 73% on RA and tried giving her a budesonide treatment which the pt did not tolerate and so she brought her the ED for further evaluation.  Pt AAO x 1 at baseline but was answering questions; has a chronic wet cough but per aide and daughter she swallows the sputum so they don't know what color it is; pt herself denies sore throat or rhinorrhea, no fevers at home.  Aide uses thickener for fluids but doesn't know if she's aspirating.  Of note, pt lost her balance on Thursday and hit her head; there is an abrasion on her forehead and L upper arm but per pt, no headache, nausea or changes in vision; no signs of fracture.          ED vitals: Tmax 37.3, HR 72-29, 135-174/51-89, 98% on 2L     CT chest showed b/l moderate pleural effusions w/ bibasilar atelectasis     CT head and cervical neck non-diagnostic 2/2 pt moving         Hospital Course:    Patient was found to have CHF exacerbation 2/2 to URI w/ elevated pro-BNP. Patient was given lasix with improvement. However, her Cr increased and subsequent CXR did not show improvement from her initial CXR on presentation. Given increase in Cr, Lasix held temporarily. The urine culture also grew kelbsiella, & she was treated with CTX. PT recommended home with home PT. She was stable for discharge home. History of Present Illness:     88 yo F w/ PMHx of Afib (on Eliquis), CVA, advanced dementia, Epilepsy, multiple falls, HTN, and COPD ( on PRN 2L O2 at home) who is presenting for hypoxia.  Pt's aide noticed that her SpO2 was 73% on RA and tried giving her a budesonide treatment which the pt did not tolerate and so she brought her the ED for further evaluation.  Pt AAO x 1 at baseline but was answering questions; has a chronic wet cough but per aide and daughter she swallows the sputum so they don't know what color it is; pt herself denies sore throat or rhinorrhea, no fevers at home.  Aide uses thickener for fluids but doesn't know if she's aspirating.  Of note, pt lost her balance on Thursday and hit her head; there is an abrasion on her forehead and L upper arm but per pt, no headache, nausea or changes in vision; no signs of fracture.          ED vitals: Tmax 37.3, HR 72-29, 135-174/51-89, 98% on 2L     CT chest showed b/l moderate pleural effusions w/ bibasilar atelectasis     CT head and cervical neck non-diagnostic 2/2 pt moving         Hospital Course:    Patient was found to have CHF exacerbation 2/2 to URI w/ elevated pro-BNP. Patient was given lasix with improvement. However, her Cr increased and subsequent CXR did not show improvement from her initial CXR on presentation. Given increase in Cr, Lasix held temporarily. The urine culture also grew kelbsiella, & she was treated with CTX. Last dose of ceftriaxone on 7/8. Patient was evaluated by speech and swallow, recommending pureed diet with honey-thick liquids. PT recommended home with home PT. She is stable for discharge home. History of Present Illness:     89 yo F w/ PMHx of Afib (on Eliquis), CVA, advanced dementia, Epilepsy, multiple falls, HTN, and COPD ( on PRN 2L O2 at home) who is presenting for hypoxia.  Pt's aide noticed that her SpO2 was 73% on RA and tried giving her a budesonide treatment which the pt did not tolerate and so she brought her the ED for further evaluation.  Pt AAO x 1 at baseline but was answering questions; has a chronic wet cough but per aide and daughter she swallows the sputum so they don't know what color it is; pt herself denies sore throat or rhinorrhea, no fevers at home.  Aide uses thickener for fluids but doesn't know if she's aspirating.  Of note, pt lost her balance on Thursday and hit her head; there is an abrasion on her forehead and L upper arm but per pt, no headache, nausea or changes in vision; no signs of fracture.          ED vitals: Tmax 37.3, HR 72-29, 135-174/51-89, 98% on 2L     CT chest showed b/l moderate pleural effusions w/ bibasilar atelectasis     CT head and cervical neck non-diagnostic 2/2 pt moving         Hospital Course:    Patient was found to have CHF exacerbation 2/2 to URI w/ elevated pro-BNP. Patient was given lasix with improvement. However, her Cr increased and subsequent CXR did not show improvement from her initial CXR on presentation. Given increase in Cr, Lasix held temporarily. The urine culture also grew kelbsiella, & she was treated with CTX. Last dose of ceftriaxone on 7/8. Patient was evaluated by speech and swallow, recommending pureed diet with honey-thick liquids. PT recommended home with home PT. TTE was done and unchanged from 11/2017. She is stable for discharge home.

## 2019-07-05 NOTE — PHYSICAL THERAPY INITIAL EVALUATION ADULT - PERTINENT HX OF CURRENT PROBLEM, REHAB EVAL
89 yo F admitted on 7/2/19  w/ PMHx of Afib (on Eliquis), CVA, advanced dementia, Epilepsy, multiple falls, HTN, and COPD ( on PRN 2L O2 at home) who is presenting for hypoxia 2/2 b/l pleural effusions likely from cardiac etiology recent fall on thurs hitting her head abrasion on forehead and L upper arm, Ct head (-), 88 yo F admitted on 7/2/19  w/ PMHx of Afib (on Eliquis), CVA, advanced dementia, Epilepsy, multiple falls, HTN, and COPD ( on PRN 2L O2 at home) who is presenting for hypoxia 2/2 b/l pleural effusions likely from cardiac etiology, CXR enlarged heart , R pleural effusion, LLL PNA vs atelectasis,  recent fall on thurs hitting her head abrasion on forehead and L upper arm, Ct head (-),

## 2019-07-05 NOTE — PROGRESS NOTE ADULT - SUBJECTIVE AND OBJECTIVE BOX
Feroz Cowan (Willie) PGY-1  Pager: [phone]     Patient is a 89y old  Female who presents with a chief complaint of Hypoxia (04 Jul 2019 09:59)      SUBJECTIVE / OVERNIGHT EVENTS:  No acute complaints over night. Denies any fevers/chills, headache, CP, SOB, abd pain, N/V/D, constipation, or leg swelling.       MEDICATIONS  (STANDING):  ALBUTerol/ipratropium for Nebulization 3 milliLiter(s) Nebulizer every 6 hours  amiodarone    Tablet 100 milliGRAM(s) Oral daily  apixaban 2.5 milliGRAM(s) Oral two times a day  atorvastatin 10 milliGRAM(s) Oral at bedtime  buDESOnide   0.5 milliGRAM(s) Respule 0.5 milliGRAM(s) Inhalation every 12 hours  cefTRIAXone   IVPB 1000 milliGRAM(s) IV Intermittent every 24 hours  diltiazem    Tablet 30 milliGRAM(s) Oral every 12 hours  furosemide    Tablet 20 milliGRAM(s) Oral <User Schedule>  levETIRAcetam 250 milliGRAM(s) Oral every 12 hours  montelukast 10 milliGRAM(s) Oral daily    MEDICATIONS  (PRN):  docusate sodium 100 milliGRAM(s) Oral daily PRN Constipation  haloperidol    Injectable 5 milliGRAM(s) IntraMuscular once PRN give before head CT  senna 2 Tablet(s) Oral at bedtime PRN Constipation          OBJECTIVE:  Vital Signs Last 24 Hrs  T(C): 36.4 (05 Jul 2019 04:48), Max: 37.1 (04 Jul 2019 18:45)  T(F): 97.5 (05 Jul 2019 04:48), Max: 98.8 (04 Jul 2019 18:45)  HR: 72 (05 Jul 2019 06:47) (62 - 74)  BP: 156/61 (05 Jul 2019 04:48) (122/73 - 156/61)  BP(mean): --  RR: 18 (05 Jul 2019 04:48) (18 - 18)  SpO2: 97% (05 Jul 2019 06:47) (96% - 99%)  PHYSICAL EXAM:  GENERAL: NAD, well-developed  HEAD:  Atraumatic, Normocephalic  EYES: EOMI, PERRLA, conjunctiva and sclera clear  NECK: Supple, No JVD  CHEST/LUNG: Clear to auscultation bilaterally; No wheeze  HEART: Regular rate and rhythm; No murmurs, rubs, or gallops  ABDOMEN: Soft, Nontender, Nondistended; Bowel sounds present  EXTREMITIES:  2+ Peripheral Pulses, No clubbing, cyanosis, or edema  PSYCH: AAOx3  NEUROLOGY: non-focal  SKIN: No rashes or lesions    CAPILLARY BLOOD GLUCOSE        I&O's Summary    04 Jul 2019 07:01  -  05 Jul 2019 07:00  --------------------------------------------------------  IN: 50 mL / OUT: 1700 mL / NET: -1650 mL              LABS:                        9.6    11.0  )-----------( 324      ( 05 Jul 2019 07:03 )             30.0     07-05    139  |  100  |  64<H>  ----------------------------<  103<H>  4.1   |  24  |  2.25<H>    Ca    8.2<L>      05 Jul 2019 07:03  Phos  4.2     07-05  Mg     2.1     07-05                  RADIOLOGY & ADDITIONAL TESTS: Feroz Cowan (Willie) PGY-1  Pager: [phone]     Patient is a 89y old  Female who presents with a chief complaint of Hypoxia (04 Jul 2019 09:59)      SUBJECTIVE / OVERNIGHT EVENTS:  No acute complaints over night. Denies any fevers/chills, headache, CP, SOB, abd pain, N/V/D, constipation, or leg swelling.       MEDICATIONS  (STANDING):  ALBUTerol/ipratropium for Nebulization 3 milliLiter(s) Nebulizer every 6 hours  amiodarone    Tablet 100 milliGRAM(s) Oral daily  apixaban 2.5 milliGRAM(s) Oral two times a day  atorvastatin 10 milliGRAM(s) Oral at bedtime  buDESOnide   0.5 milliGRAM(s) Respule 0.5 milliGRAM(s) Inhalation every 12 hours  cefTRIAXone   IVPB 1000 milliGRAM(s) IV Intermittent every 24 hours  diltiazem    Tablet 30 milliGRAM(s) Oral every 12 hours  furosemide    Tablet 20 milliGRAM(s) Oral <User Schedule>  levETIRAcetam 250 milliGRAM(s) Oral every 12 hours  montelukast 10 milliGRAM(s) Oral daily    MEDICATIONS  (PRN):  docusate sodium 100 milliGRAM(s) Oral daily PRN Constipation  haloperidol    Injectable 5 milliGRAM(s) IntraMuscular once PRN give before head CT  senna 2 Tablet(s) Oral at bedtime PRN Constipation          OBJECTIVE:  Vital Signs Last 24 Hrs  T(C): 36.4 (05 Jul 2019 04:48), Max: 37.1 (04 Jul 2019 18:45)  T(F): 97.5 (05 Jul 2019 04:48), Max: 98.8 (04 Jul 2019 18:45)  HR: 72 (05 Jul 2019 06:47) (62 - 74)  BP: 156/61 (05 Jul 2019 04:48) (122/73 - 156/61)  BP(mean): --  RR: 18 (05 Jul 2019 04:48) (18 - 18)  SpO2: 97% (05 Jul 2019 06:47) (96% - 99%)    PHYSICAL EXAM:  GENERAL: NAD, well-developed  HEAD:  Atraumatic, Normocephalic  EYES: EOMI, PERRLA, conjunctiva and sclera clear  NECK: Supple, No JVD  CHEST/LUNG: unable to appreciate expir. lung sounds due to grunting  HEART: Regular rate and rhythm; rubs, or gallops; holosytoluc murmur on L sternal border  ABDOMEN: Soft, Nontender, Nondistended; Bowel sounds present  EXTREMITIES:  No clubbing, cyanosis, or edema    CAPILLARY BLOOD GLUCOSE        I&O's Summary    04 Jul 2019 07:01  -  05 Jul 2019 07:00  --------------------------------------------------------  IN: 50 mL / OUT: 1700 mL / NET: -1650 mL              LABS:                        9.6    11.0  )-----------( 324      ( 05 Jul 2019 07:03 )             30.0     07-05    139  |  100  |  64<H>  ----------------------------<  103<H>  4.1   |  24  |  2.25<H>    Ca    8.2<L>      05 Jul 2019 07:03  Phos  4.2     07-05  Mg     2.1     07-05                  RADIOLOGY & ADDITIONAL TESTS:

## 2019-07-05 NOTE — SWALLOW BEDSIDE ASSESSMENT ADULT - MODE OF PRESENTATION
Pt refused to self feed/fed by clinician/spoon spoon/fed by clinician/cup cup/spoon/fed by clinician fed by clinician/spoon

## 2019-07-05 NOTE — PHYSICAL THERAPY INITIAL EVALUATION ADULT - ADDITIONAL COMMENTS
lives w/ lives apt w/ elevator,  has rolling walker and w/c, 24/7 HHA, assist for ADLs and amb/ glasses for reading, hearing good, R handed

## 2019-07-05 NOTE — DISCHARGE NOTE PROVIDER - NSDCCPCAREPLAN_GEN_ALL_CORE_FT
PRINCIPAL DISCHARGE DIAGNOSIS  Diagnosis: CHF exacerbation  Assessment and Plan of Treatment: You were treated for your Congestive Heart Failure with lasix (water pill). Please follow up with your PCP, cardiologst, and pulmonary doctor.      SECONDARY DISCHARGE DIAGNOSES  Diagnosis: DIANA (acute kidney injury)  Assessment and Plan of Treatment: You were treated for your kidney injury. PRINCIPAL DISCHARGE DIAGNOSIS  Diagnosis: CHF exacerbation  Assessment and Plan of Treatment: You were treated for your Congestive Heart Failure with lasix (water pill). Please follow up with your PCP, cardiologst, and pulmonary doctor.      SECONDARY DISCHARGE DIAGNOSES  Diagnosis: Bacterial UTI  Assessment and Plan of Treatment: You were treated for your urinary tract infection. Please follow up with your primary care doctor.    Diagnosis: DIANA (acute kidney injury)  Assessment and Plan of Treatment: You were treated for your kidney injury.

## 2019-07-05 NOTE — PROGRESS NOTE ADULT - PROBLEM SELECTOR PLAN 4
Report of mechanical fall per aid  - CT head and cervical neck, non-diagnostic  - head CT to r/o bleed before restarting Eliquis; head CT neg. restarted eliquis  - 5mg IV haldol for agitation - Uclx; klebsiella   - CTX 1g 2/5 day

## 2019-07-05 NOTE — PROGRESS NOTE ADULT - PROBLEM SELECTOR PLAN 3
Acute renal failure   - DIANA on CKD stage 4.  - Strict I's & O's  - Nephro consult if worsening  - f/u urine lytes  - avoid nephrotoxic agents; no ACE/ARB Acute renal failure   - DIANA on CKD stage 4. worsening Cr  - Strict I's & O's  - Nephro consult if worsening  - avoid nephrotoxic agents; no ACE/ARB  - holding lasix today

## 2019-07-05 NOTE — PROGRESS NOTE ADULT - PROBLEM SELECTOR PLAN 6
- Resolved  - No peaked t waves on EKG. EKG showed 1st degree AV block  - will monitor - c/w 100mg amio qd  - will dose 30mg Diltiazem BID in setting of heart failure likely 2/2 valvular dysfunction -- half home dose in setting of decompensated HF.   - c/w Eliquis 2.5 BID

## 2019-07-05 NOTE — PROGRESS NOTE ADULT - PROBLEM SELECTOR PLAN 7
- f/u Urine clx and treat accordingly  - started CTX 1g (7/4) - Resolved  - No peaked t waves on EKG. EKG showed 1st degree AV block  - will monitor

## 2019-07-05 NOTE — SWALLOW BEDSIDE ASSESSMENT ADULT - COMMENTS
Hx cont: CT head and cervical neck non-diagnostic 2/2 pt moving. Repeat head CT neg. Hypoxia 2/2 b/l pleural effusions likely from cardiac etiology.  Acute hypoxic respiratory failure less likely 2/2 PNA as pt not presenting w/ fever, significant cough; s/p 1 dose of zosyn in ED.  Will not c/w zosyn. + DIANA, + hyperkalemia - Resolved. + Acute on chronic decompensated systolic heart failure -- secondary to valvulopathies. Pulm on board. Multifactorial resp failure--chf/AS, copd, atelectasis--O2 2 liters NC. Recommend diuresis, Duonebs, Budesonide nebs. Medicine: acute respiratory failure 2/2 to CHF exacerbation vs COPD exacerbation 2/2 to URI. + Bacteria in urine->started CTX 1g (7/4).  Swallow history:  Pt seen for bedside swallow evaluation 9/27/14 and presented with an oral dysphagia. Recommendations included a soft texture diet with thin liquid. Hx cont: CT head and cervical neck non-diagnostic 2/2 pt moving. Repeat head CT neg. CT chest: Moderate-sized bilateral pleural effusions with bibasilar atelectasis. Hypoxia 2/2 b/l pleural effusions likely from cardiac etiology.  Acute hypoxic respiratory failure less likely 2/2 PNA as pt not presenting w/ fever, significant cough; s/p 1 dose of zosyn in ED.  Will not c/w zosyn. + DIANA, + hyperkalemia - Resolved. + Acute on chronic decompensated systolic heart failure -- secondary to valvulopathies. Pulm on board. Multifactorial resp failure--chf/AS, copd, atelectasis--O2 2 liters NC. Recommend diuresis, Duonebs, Budesonide nebs. Medicine: acute respiratory failure 2/2 to CHF exacerbation vs COPD exacerbation 2/2 to URI. + Bacteria in urine->started CTX 1g (7/4).  Swallow history:  Pt seen for bedside swallow evaluation 9/27/14 and presented with an oral dysphagia. Recommendations included a soft texture diet with thin liquid. Trails x2. Pt refused further trials reporting satiety.

## 2019-07-05 NOTE — PROGRESS NOTE ADULT - PROBLEM SELECTOR PLAN 9
- will start on 1/2 dose of home diltiazem in setting of heart failure likely 2/2 valvular disorder  - if BP > 170 can give IVP 10 hydralazine - on 1/2 dose of home diltiazem in setting of heart failure likely 2/2 valvular disorder  - if BP > 170 can give IVP 10 hydralazine

## 2019-07-05 NOTE — SWALLOW BEDSIDE ASSESSMENT ADULT - ASR SWALLOW RECOMMEND DIAG
Silent aspiration cannot be r/o at the bedside. If aspiration pna is apart of the ddx would recommend MBS to objectively assess the oropharyngeal swallow mechanism and r/o silent aspiration. Silent aspiration cannot be r/o at the bedside. If aspiration pna is apart of the ddx would recommend MBS to objectively assess the oropharyngeal swallow mechanism and r/o silent aspiration. Discussed with attending MD Martel, per MD MBS to be deferred at this time.

## 2019-07-05 NOTE — SWALLOW BEDSIDE ASSESSMENT ADULT - ORAL PHASE
Delayed oral transit time/Within functional limits suspected premature spillover via cup suspected premature spillover via spoon and cup Lingual stasis/Delayed oral transit time

## 2019-07-05 NOTE — SWALLOW BEDSIDE ASSESSMENT ADULT - ADDITIONAL RECOMMENDATIONS
Maintain good oral hygiene. Maintain good oral hygiene.  This service will continue to follow to assess for tolerance to diet and candidacy for diet upgrade.

## 2019-07-05 NOTE — PROGRESS NOTE ADULT - PROBLEM SELECTOR PLAN 1
Acute on chronic CHF exacerbation 2/2 to URI  - F/u TTE   - f/u pro-BNP; 6142  - resume home lasix dosing as pt now euvolemic to possibly dry given uptrending Cr Acute on chronic CHF exacerbation 2/2 to URI. improved  - F/u TTE (will expedite tomorrow)  - pro-BNP improved today.   - Hold lasix in the setting of up treading Cr. Acute on chronic CHF exacerbation 2/2 to URI. improved  - F/u TTE (will expedite tomorrow)  - pro-BNP improved today.   - CXR showed unchanged b/l pleural effusions  - Hold lasix in the setting of up treading Cr.

## 2019-07-05 NOTE — PHYSICAL THERAPY INITIAL EVALUATION ADULT - PASSIVE RANGE OF MOTION EXAMINATION, REHAB EVAL
performs some AAROM/bilateral lower extremity Passive ROM was WFL (within functional limits)/bilateral upper extremity Passive ROM was WFL (within functional limits)

## 2019-07-05 NOTE — PROGRESS NOTE ADULT - PROBLEM SELECTOR PLAN 5
- c/w 100mg amio qd  - will dose 30mg Diltiazem BID in setting of heart failure likely 2/2 valvular dysfunction -- half home dose in setting of decompensated HF.   - not restarting Eliquis until adequate CT head is performed to r/o bleed Report of mechanical fall per aid  - CT head and cervical neck, non-diagnostic  - head CT to ruled out bleed. on eliquis 2.5 BID  - 5mg IV haldol for agitation

## 2019-07-05 NOTE — SWALLOW BEDSIDE ASSESSMENT ADULT - SWALLOW EVAL: PATIENT/FAMILY GOALS STATEMENT
History received from pt's HHA. Pt had a swallow evaluation in early 2018 with recommendation to use "thick it." HHA did not specify to what consistency. Pt noted to cough with thin liquid prompting HHA to thicken liquid to approximately a nectar thick consistency. Pt received soft food cut into small pieces. If food is "too hard" pt will expectorate PO. HHA had given pt pieces of raw apple but pt would cough and pocket food so she stopped. A reports no known h/o pna to her knowledge. Pt tolerated soft, chopped food and ?nectar thickened liquid without difficulty PTA. Since hospitalization, pt has been tolerating diet of dysphagia 1 with honey thick liquid. Honey thick liquid provided by spoon. HHA reports pt has been accepting puree food without difficulty.

## 2019-07-06 LAB
ANION GAP SERPL CALC-SCNC: 15 MMOL/L — SIGNIFICANT CHANGE UP (ref 5–17)
BUN SERPL-MCNC: 65 MG/DL — HIGH (ref 7–23)
CALCIUM SERPL-MCNC: 8.7 MG/DL — SIGNIFICANT CHANGE UP (ref 8.4–10.5)
CHLORIDE SERPL-SCNC: 101 MMOL/L — SIGNIFICANT CHANGE UP (ref 96–108)
CO2 SERPL-SCNC: 24 MMOL/L — SIGNIFICANT CHANGE UP (ref 22–31)
CREAT SERPL-MCNC: 2.11 MG/DL — HIGH (ref 0.5–1.3)
GLUCOSE SERPL-MCNC: 96 MG/DL — SIGNIFICANT CHANGE UP (ref 70–99)
HCT VFR BLD CALC: 29.8 % — LOW (ref 34.5–45)
HGB BLD-MCNC: 9.6 G/DL — LOW (ref 11.5–15.5)
MAGNESIUM SERPL-MCNC: 2.1 MG/DL — SIGNIFICANT CHANGE UP (ref 1.6–2.6)
MCHC RBC-ENTMCNC: 26.7 PG — LOW (ref 27–34)
MCHC RBC-ENTMCNC: 32.4 GM/DL — SIGNIFICANT CHANGE UP (ref 32–36)
MCV RBC AUTO: 82.4 FL — SIGNIFICANT CHANGE UP (ref 80–100)
PHOSPHATE SERPL-MCNC: 3.8 MG/DL — SIGNIFICANT CHANGE UP (ref 2.5–4.5)
PLATELET # BLD AUTO: 312 K/UL — SIGNIFICANT CHANGE UP (ref 150–400)
POTASSIUM SERPL-MCNC: 3.7 MMOL/L — SIGNIFICANT CHANGE UP (ref 3.5–5.3)
POTASSIUM SERPL-SCNC: 3.7 MMOL/L — SIGNIFICANT CHANGE UP (ref 3.5–5.3)
RBC # BLD: 3.62 M/UL — LOW (ref 3.8–5.2)
RBC # FLD: 15 % — HIGH (ref 10.3–14.5)
SODIUM SERPL-SCNC: 140 MMOL/L — SIGNIFICANT CHANGE UP (ref 135–145)
WBC # BLD: 11.2 K/UL — HIGH (ref 3.8–10.5)
WBC # FLD AUTO: 11.2 K/UL — HIGH (ref 3.8–10.5)

## 2019-07-06 PROCEDURE — 99233 SBSQ HOSP IP/OBS HIGH 50: CPT

## 2019-07-06 RX ORDER — DILTIAZEM HCL 120 MG
1 CAPSULE, EXT RELEASE 24 HR ORAL
Qty: 0 | Refills: 0 | DISCHARGE
Start: 2019-07-06

## 2019-07-06 RX ORDER — LEVETIRACETAM 250 MG/1
1 TABLET, FILM COATED ORAL
Qty: 0 | Refills: 0 | DISCHARGE
Start: 2019-07-06

## 2019-07-06 RX ORDER — SENNA PLUS 8.6 MG/1
2 TABLET ORAL
Qty: 0 | Refills: 0 | DISCHARGE
Start: 2019-07-06

## 2019-07-06 RX ORDER — ATORVASTATIN CALCIUM 80 MG/1
1 TABLET, FILM COATED ORAL
Qty: 0 | Refills: 0 | DISCHARGE
Start: 2019-07-06

## 2019-07-06 RX ORDER — DOCUSATE SODIUM 100 MG
1 CAPSULE ORAL
Qty: 0 | Refills: 0 | DISCHARGE
Start: 2019-07-06

## 2019-07-06 RX ORDER — MONTELUKAST 4 MG/1
1 TABLET, CHEWABLE ORAL
Qty: 0 | Refills: 0 | DISCHARGE
Start: 2019-07-06

## 2019-07-06 RX ORDER — APIXABAN 2.5 MG/1
1 TABLET, FILM COATED ORAL
Qty: 0 | Refills: 0 | DISCHARGE
Start: 2019-07-06

## 2019-07-06 RX ORDER — AMIODARONE HYDROCHLORIDE 400 MG/1
0.5 TABLET ORAL
Qty: 0 | Refills: 0 | DISCHARGE
Start: 2019-07-06

## 2019-07-06 RX ADMIN — Medication 30 MILLIGRAM(S): at 17:19

## 2019-07-06 RX ADMIN — CEFTRIAXONE 100 MILLIGRAM(S): 500 INJECTION, POWDER, FOR SOLUTION INTRAMUSCULAR; INTRAVENOUS at 18:07

## 2019-07-06 RX ADMIN — Medication 0.5 MILLIGRAM(S): at 18:39

## 2019-07-06 RX ADMIN — LEVETIRACETAM 250 MILLIGRAM(S): 250 TABLET, FILM COATED ORAL at 17:19

## 2019-07-06 RX ADMIN — Medication 3 MILLILITER(S): at 06:09

## 2019-07-06 RX ADMIN — Medication 3 MILLILITER(S): at 11:29

## 2019-07-06 RX ADMIN — AMIODARONE HYDROCHLORIDE 100 MILLIGRAM(S): 400 TABLET ORAL at 05:51

## 2019-07-06 RX ADMIN — MONTELUKAST 10 MILLIGRAM(S): 4 TABLET, CHEWABLE ORAL at 11:20

## 2019-07-06 RX ADMIN — LEVETIRACETAM 250 MILLIGRAM(S): 250 TABLET, FILM COATED ORAL at 05:51

## 2019-07-06 RX ADMIN — Medication 30 MILLIGRAM(S): at 05:52

## 2019-07-06 RX ADMIN — ATORVASTATIN CALCIUM 10 MILLIGRAM(S): 80 TABLET, FILM COATED ORAL at 22:25

## 2019-07-06 RX ADMIN — Medication 3 MILLILITER(S): at 18:39

## 2019-07-06 RX ADMIN — APIXABAN 2.5 MILLIGRAM(S): 2.5 TABLET, FILM COATED ORAL at 17:19

## 2019-07-06 RX ADMIN — APIXABAN 2.5 MILLIGRAM(S): 2.5 TABLET, FILM COATED ORAL at 05:51

## 2019-07-06 RX ADMIN — Medication 0.5 MILLIGRAM(S): at 06:09

## 2019-07-06 NOTE — PROGRESS NOTE ADULT - PROBLEM SELECTOR PLAN 5
Report of mechanical fall per aid  - CT head and cervical neck, non-diagnostic  - head CT to ruled out bleed. on eliquis 2.5 BID  - 5mg IV haldol for agitation Report of mechanical fall per aid  - CT head and cervical neck, non-diagnostic  - head CT to ruled out bleed. re-started eliquis 2.5 BID

## 2019-07-06 NOTE — PROGRESS NOTE ADULT - PROBLEM SELECTOR PLAN 6
- c/w 100mg amio qd  - will dose 30mg Diltiazem BID in setting of heart failure likely 2/2 valvular dysfunction -- half home dose in setting of decompensated HF.   - c/w Eliquis 2.5 BID

## 2019-07-06 NOTE — PROGRESS NOTE ADULT - PROBLEM SELECTOR PLAN 2
- Monitor  - supportive care.  - NC O2 as needed (2L) - Monitor  - supportive care.  - NC O2 as needed (2L)- on home O2

## 2019-07-06 NOTE — PROGRESS NOTE ADULT - PROBLEM SELECTOR PLAN 10
- DVT ppx: Eliquis 2.5 mg BID  - Dementia: Asp. precautions  - Diet: dysphagia 1 w/ honey consistency. speech & swallow recommended PO via teaspoon  - Seizure dx: c/w keppra 250 BID  - GOD: HCP: Her daughter Rachel: # [phone]. MOLST form signed 7/5  - Dispo: Home with home PT - DVT ppx: Eliquis 2.5 mg BID  - Dementia: Asp. precautions  - Diet: dysphagia 1 w/ honey consistency. speech & swallow recommended PO via teaspoon  - Seizure dx: c/w keppra 250 BID  - GOD: HCP: Her daughter Rachel: # [phone]. MOLST form signed 7/5  - Dispo: Home with home PT; pending TTE

## 2019-07-06 NOTE — PROGRESS NOTE ADULT - ASSESSMENT
88 yo F w/ PMHx of Afib (on Eliquis), CVA, advanced dementia, Epilepsy, multiple falls, HTN, and COPD (on PRN 2L O2 at home) p.w for hypoxia found to have acute respiratory failure 2/2 to CHF exacerbation vs COPD exacerbation 2/2 to URI. 89 yo F w/ PMHx of Afib (on Eliquis), CVA, advanced dementia, Epilepsy, multiple falls, HTN, and COPD (on PRN 2L O2 at home) p.w for hypoxia found to have acute respiratory failure 2/2 to CHF exacerbation vs COPD exacerbation 2/2 to URI, now improved.

## 2019-07-06 NOTE — PROGRESS NOTE ADULT - PROBLEM SELECTOR PLAN 9
- on 1/2 dose of home diltiazem in setting of heart failure likely 2/2 valvular disorder  - if BP > 170 can give IVP 10 hydralazine - on 1/2 dose of home diltiazem in setting of heart failure likely 2/2 valvular disorder  - if BP > 180 can give IVP 10 hydralazine

## 2019-07-06 NOTE — PROGRESS NOTE ADULT - PROBLEM SELECTOR PLAN 3
Acute renal failure   - DIANA on CKD stage 4. worsening Cr  - Strict I's & O's  - Nephro consult if worsening  - avoid nephrotoxic agents; no ACE/ARB  - holding lasix today Acute renal failure   - DIANA on CKD stage 4. originally worsening Cr, now improved with Lasix held   - Strict I's & O's- net negative 800 cc  - Nephro consult if worsening  - avoid nephrotoxic agents; no ACE/ARB

## 2019-07-06 NOTE — PROGRESS NOTE ADULT - SUBJECTIVE AND OBJECTIVE BOX
Ebony Ochoa, PGY1   Contact/Pager - [phone] / [phone]    SUBJECTIVE / OVERNIGHT EVENTS:  -no acute events overnight  -denies any complaints including CP, SOB, abdominal pain, N/V, diarrhea, constipation, headache, confusion, fever/chills      MEDICATIONS  (STANDING):  ALBUTerol/ipratropium for Nebulization 3 milliLiter(s) Nebulizer every 6 hours  amiodarone    Tablet 100 milliGRAM(s) Oral daily  apixaban 2.5 milliGRAM(s) Oral two times a day  atorvastatin 10 milliGRAM(s) Oral at bedtime  buDESOnide   0.5 milliGRAM(s) Respule 0.5 milliGRAM(s) Inhalation every 12 hours  cefTRIAXone   IVPB 1000 milliGRAM(s) IV Intermittent every 24 hours  diltiazem    Tablet 30 milliGRAM(s) Oral every 12 hours  levETIRAcetam 250 milliGRAM(s) Oral every 12 hours  montelukast 10 milliGRAM(s) Oral daily    MEDICATIONS  (PRN):  docusate sodium 100 milliGRAM(s) Oral daily PRN Constipation  haloperidol    Injectable 5 milliGRAM(s) IntraMuscular once PRN give before head CT  senna 2 Tablet(s) Oral at bedtime PRN Constipation      Allergies    No Known Allergies    Intolerances          Vital Signs Last 24 Hrs  T(C): 36.7 (06 Jul 2019 04:37), Max: 36.9 (05 Jul 2019 21:52)  T(F): 98 (06 Jul 2019 04:37), Max: 98.5 (05 Jul 2019 21:52)  HR: 73 (06 Jul 2019 06:10) (68 - 78)  BP: 118/61 (06 Jul 2019 04:37) (104/62 - 133/63)  BP(mean): --  RR: 18 (06 Jul 2019 04:37) (18 - 21)  SpO2: 98% (06 Jul 2019 06:10) (95% - 98%)  CAPILLARY BLOOD GLUCOSE        I&O's Summary    05 Jul 2019 07:01  -  06 Jul 2019 07:00  --------------------------------------------------------  IN: 410 mL / OUT: 350 mL / NET: 60 mL          PHYSICAL EXAM:  GENERAL: NAD, well-developed  HEAD:  AT, NC  EYES: EOMI, PERRLA, conjunctiva and sclera clear  ENMT: Airway patent. MMM. Good dentition, no lesions.  NECK: Supple, No JVD  CHEST/LUNG: CTABL; No wheezing, rhonci, or rales  HEART: RRR; Normal S1, S2. No murmurs, rubs, or gallops  ABDOMEN: Soft, NT, ND; Bowel sounds present. No organomegaly  EXTREMITIES:  2+ Peripheral Pulses, No clubbing, cyanosis, or edema  PSYCH: AAOx3  NEUROLOGY: non-focal  SKIN: Warm, dry, intact; No rashes or lesions      LABS:                        9.6    11.2  )-----------( 312      ( 06 Jul 2019 06:59 )             29.8     07-06    140  |  101  |  65<H>  ----------------------------<  96  3.7   |  24  |  2.11<H>    Ca    8.7      06 Jul 2019 06:59  Phos  3.8     07-06  Mg     2.1     07-06                        RADIOLOGY & ADDITIONAL TESTS: Ebony Ochoa, PGY1   Contact/Pager - [phone] / [phone]    SUBJECTIVE / OVERNIGHT EVENTS:  -no acute events overnight  -patient denies any new complaints, however she is confused per baseline      MEDICATIONS  (STANDING):  ALBUTerol/ipratropium for Nebulization 3 milliLiter(s) Nebulizer every 6 hours  amiodarone    Tablet 100 milliGRAM(s) Oral daily  apixaban 2.5 milliGRAM(s) Oral two times a day  atorvastatin 10 milliGRAM(s) Oral at bedtime  buDESOnide   0.5 milliGRAM(s) Respule 0.5 milliGRAM(s) Inhalation every 12 hours  cefTRIAXone   IVPB 1000 milliGRAM(s) IV Intermittent every 24 hours  diltiazem    Tablet 30 milliGRAM(s) Oral every 12 hours  levETIRAcetam 250 milliGRAM(s) Oral every 12 hours  montelukast 10 milliGRAM(s) Oral daily    MEDICATIONS  (PRN):  docusate sodium 100 milliGRAM(s) Oral daily PRN Constipation  haloperidol    Injectable 5 milliGRAM(s) IntraMuscular once PRN give before head CT  senna 2 Tablet(s) Oral at bedtime PRN Constipation      Allergies    No Known Allergies    Intolerances          Vital Signs Last 24 Hrs  T(C): 36.7 (06 Jul 2019 04:37), Max: 36.9 (05 Jul 2019 21:52)  T(F): 98 (06 Jul 2019 04:37), Max: 98.5 (05 Jul 2019 21:52)  HR: 73 (06 Jul 2019 06:10) (68 - 78)  BP: 118/61 (06 Jul 2019 04:37) (104/62 - 133/63)  BP(mean): --  RR: 18 (06 Jul 2019 04:37) (18 - 21)  SpO2: 98% (06 Jul 2019 06:10) (95% - 98%)  CAPILLARY BLOOD GLUCOSE        I&O's Summary    05 Jul 2019 07:01  -  06 Jul 2019 07:00  --------------------------------------------------------  IN: 410 mL / OUT: 350 mL / NET: 60 mL          PHYSICAL EXAM:  GENERAL: NAD, thin  HEAD:  AT, NC; small laceration on forehead  EYES: EOMI, PERRLA, conjunctiva and sclera clear  ENMT: Airway patent. MMM. Good dentition, no lesions.  NECK: Supple, No JVD  CHEST/LUNG: CTABL; No wheezing, rhonci, or rales  HEART: RRR; Normal S1, S2. No murmurs, rubs, or gallops  ABDOMEN: Soft, NT, ND; Bowel sounds present. No organomegaly  EXTREMITIES:  2+ Peripheral Pulses, No clubbing, cyanosis, or edema  PSYCH: AAOx1  NEUROLOGY: non-focal  SKIN: Warm, dry, intact; No rashes or lesions      LABS:                        9.6    11.2  )-----------( 312      ( 06 Jul 2019 06:59 )             29.8     07-06    140  |  101  |  65<H>  ----------------------------<  96  3.7   |  24  |  2.11<H>    Ca    8.7      06 Jul 2019 06:59  Phos  3.8     07-06  Mg     2.1     07-06                        RADIOLOGY & ADDITIONAL TESTS:

## 2019-07-06 NOTE — PROGRESS NOTE ADULT - PROBLEM SELECTOR PLAN 1
Acute on chronic CHF exacerbation 2/2 to URI. improved  - F/u TTE (will expedite tomorrow)  - pro-BNP improved today.   - CXR showed unchanged b/l pleural effusions  - Hold lasix in the setting of up treading Cr. Acute on chronic CHF exacerbation 2/2 to URI. breathing now improved with diuresis   - F/u TTE (will expedite)   - pro-BNP improved today.   - CXR showed unchanged b/l pleural effusions  - Hold lasix in the setting of up treading Cr; patient with net negative UOP (800 cc neg)

## 2019-07-07 LAB
ANION GAP SERPL CALC-SCNC: 13 MMOL/L — SIGNIFICANT CHANGE UP (ref 5–17)
BUN SERPL-MCNC: 62 MG/DL — HIGH (ref 7–23)
CALCIUM SERPL-MCNC: 8.2 MG/DL — LOW (ref 8.4–10.5)
CHLORIDE SERPL-SCNC: 101 MMOL/L — SIGNIFICANT CHANGE UP (ref 96–108)
CO2 SERPL-SCNC: 26 MMOL/L — SIGNIFICANT CHANGE UP (ref 22–31)
CREAT SERPL-MCNC: 1.83 MG/DL — HIGH (ref 0.5–1.3)
GLUCOSE SERPL-MCNC: 94 MG/DL — SIGNIFICANT CHANGE UP (ref 70–99)
HCT VFR BLD CALC: 28.7 % — LOW (ref 34.5–45)
HGB BLD-MCNC: 9.3 G/DL — LOW (ref 11.5–15.5)
MAGNESIUM SERPL-MCNC: 2.2 MG/DL — SIGNIFICANT CHANGE UP (ref 1.6–2.6)
MCHC RBC-ENTMCNC: 26.6 PG — LOW (ref 27–34)
MCHC RBC-ENTMCNC: 32.5 GM/DL — SIGNIFICANT CHANGE UP (ref 32–36)
MCV RBC AUTO: 81.7 FL — SIGNIFICANT CHANGE UP (ref 80–100)
PHOSPHATE SERPL-MCNC: 3.3 MG/DL — SIGNIFICANT CHANGE UP (ref 2.5–4.5)
PLATELET # BLD AUTO: 312 K/UL — SIGNIFICANT CHANGE UP (ref 150–400)
POTASSIUM SERPL-MCNC: 3.5 MMOL/L — SIGNIFICANT CHANGE UP (ref 3.5–5.3)
POTASSIUM SERPL-SCNC: 3.5 MMOL/L — SIGNIFICANT CHANGE UP (ref 3.5–5.3)
RBC # BLD: 3.51 M/UL — LOW (ref 3.8–5.2)
RBC # FLD: 15 % — HIGH (ref 10.3–14.5)
SODIUM SERPL-SCNC: 140 MMOL/L — SIGNIFICANT CHANGE UP (ref 135–145)
WBC # BLD: 11.2 K/UL — HIGH (ref 3.8–10.5)
WBC # FLD AUTO: 11.2 K/UL — HIGH (ref 3.8–10.5)

## 2019-07-07 PROCEDURE — 99233 SBSQ HOSP IP/OBS HIGH 50: CPT

## 2019-07-07 RX ADMIN — LEVETIRACETAM 250 MILLIGRAM(S): 250 TABLET, FILM COATED ORAL at 06:26

## 2019-07-07 RX ADMIN — CEFTRIAXONE 100 MILLIGRAM(S): 500 INJECTION, POWDER, FOR SOLUTION INTRAMUSCULAR; INTRAVENOUS at 18:32

## 2019-07-07 RX ADMIN — Medication 3 MILLILITER(S): at 00:03

## 2019-07-07 RX ADMIN — Medication 3 MILLILITER(S): at 06:28

## 2019-07-07 RX ADMIN — APIXABAN 2.5 MILLIGRAM(S): 2.5 TABLET, FILM COATED ORAL at 18:10

## 2019-07-07 RX ADMIN — AMIODARONE HYDROCHLORIDE 100 MILLIGRAM(S): 400 TABLET ORAL at 06:22

## 2019-07-07 RX ADMIN — Medication 3 MILLILITER(S): at 17:51

## 2019-07-07 RX ADMIN — LEVETIRACETAM 250 MILLIGRAM(S): 250 TABLET, FILM COATED ORAL at 18:10

## 2019-07-07 RX ADMIN — Medication 0.5 MILLIGRAM(S): at 17:51

## 2019-07-07 RX ADMIN — MONTELUKAST 10 MILLIGRAM(S): 4 TABLET, CHEWABLE ORAL at 11:32

## 2019-07-07 RX ADMIN — ATORVASTATIN CALCIUM 10 MILLIGRAM(S): 80 TABLET, FILM COATED ORAL at 22:46

## 2019-07-07 RX ADMIN — APIXABAN 2.5 MILLIGRAM(S): 2.5 TABLET, FILM COATED ORAL at 06:23

## 2019-07-07 RX ADMIN — Medication 0.5 MILLIGRAM(S): at 06:28

## 2019-07-07 RX ADMIN — Medication 30 MILLIGRAM(S): at 06:24

## 2019-07-07 RX ADMIN — Medication 30 MILLIGRAM(S): at 18:10

## 2019-07-07 RX ADMIN — Medication 3 MILLILITER(S): at 12:48

## 2019-07-07 NOTE — PROGRESS NOTE ADULT - PROBLEM SELECTOR PLAN 9
- on 1/2 dose of home diltiazem in setting of heart failure likely 2/2 valvular disorder  - if BP > 180 can give IVP 10 hydralazine

## 2019-07-07 NOTE — PROGRESS NOTE ADULT - PROBLEM SELECTOR PLAN 1
Acute on chronic CHF exacerbation 2/2 to URI. breathing now improved with diuresis   - F/u TTE (will expedite)   - pro-BNP improved today.   - CXR showed unchanged b/l pleural effusions  - Hold lasix in the setting of up treading Cr; patient with net negative UOP (800 cc neg) Acute on chronic CHF exacerbation 2/2 to URI. breathing now improved with diuresis   - F/u TTE (will expedite)   - CXR showed unchanged b/l pleural effusions  - Hold lasix in the setting of up treading Cr. Cr Improving today.

## 2019-07-07 NOTE — PROGRESS NOTE ADULT - PROBLEM SELECTOR PLAN 5
Report of mechanical fall per aid  - CT head and cervical neck, non-diagnostic  - head CT to ruled out bleed. re-started eliquis 2.5 BID

## 2019-07-07 NOTE — PROGRESS NOTE ADULT - PROBLEM SELECTOR PLAN 10
- DVT ppx: Eliquis 2.5 mg BID  - Dementia: Asp. precautions  - Diet: dysphagia 1 w/ honey consistency. speech & swallow recommended PO via teaspoon  - Seizure dx: c/w keppra 250 BID  - GOD: HCP: Her daughter Rachel: # [phone]. MOLST form signed 7/5  - Dispo: Home with home PT; pending TTE

## 2019-07-07 NOTE — PROGRESS NOTE ADULT - ASSESSMENT
89 yo F w/ PMHx of Afib (on Eliquis), CVA, advanced dementia, Epilepsy, multiple falls, HTN, and COPD (on PRN 2L O2 at home) p.w for hypoxia found to have acute respiratory failure 2/2 to CHF exacerbation vs COPD exacerbation 2/2 to URI, now improved.

## 2019-07-07 NOTE — PROGRESS NOTE ADULT - SUBJECTIVE AND OBJECTIVE BOX
Feroz Cowan (Willie) PGY-1  Pager: [phone]     Patient is a 89y old  Female who presents with a chief complaint of Hypoxia (05 Jul 2019 21:43)      SUBJECTIVE / OVERNIGHT EVENTS:  No acute complaints over night. Denies any fevers/chills, headache, CP, SOB, abd pain, N/V/D, constipation, or leg swelling.       MEDICATIONS  (STANDING):  ALBUTerol/ipratropium for Nebulization 3 milliLiter(s) Nebulizer every 6 hours  amiodarone    Tablet 100 milliGRAM(s) Oral daily  apixaban 2.5 milliGRAM(s) Oral two times a day  atorvastatin 10 milliGRAM(s) Oral at bedtime  buDESOnide   0.5 milliGRAM(s) Respule 0.5 milliGRAM(s) Inhalation every 12 hours  cefTRIAXone   IVPB 1000 milliGRAM(s) IV Intermittent every 24 hours  diltiazem    Tablet 30 milliGRAM(s) Oral every 12 hours  levETIRAcetam 250 milliGRAM(s) Oral every 12 hours  montelukast 10 milliGRAM(s) Oral daily    MEDICATIONS  (PRN):  docusate sodium 100 milliGRAM(s) Oral daily PRN Constipation  senna 2 Tablet(s) Oral at bedtime PRN Constipation          OBJECTIVE:  Vital Signs Last 24 Hrs  T(C): 36.3 (07 Jul 2019 05:06), Max: 36.8 (06 Jul 2019 13:42)  T(F): 97.3 (07 Jul 2019 05:06), Max: 98.2 (06 Jul 2019 13:42)  HR: 73 (07 Jul 2019 06:29) (70 - 78)  BP: 195/60 (07 Jul 2019 06:20) (117/63 - 195/60)  BP(mean): --  RR: 18 (07 Jul 2019 05:06) (16 - 18)  SpO2: 95% (07 Jul 2019 06:29) (90% - 100%)  PHYSICAL EXAM:  GENERAL: NAD, well-developed  HEAD:  Atraumatic, Normocephalic  EYES: EOMI, PERRLA, conjunctiva and sclera clear  NECK: Supple, No JVD  CHEST/LUNG: Clear to auscultation bilaterally; No wheeze  HEART: Regular rate and rhythm; No murmurs, rubs, or gallops  ABDOMEN: Soft, Nontender, Nondistended; Bowel sounds present  EXTREMITIES:  2+ Peripheral Pulses, No clubbing, cyanosis, or edema  PSYCH: AAOx3  NEUROLOGY: non-focal  SKIN: No rashes or lesions    CAPILLARY BLOOD GLUCOSE        I&O's Summary            LABS:                        9.6    11.2  )-----------( 312      ( 06 Jul 2019 06:59 )             29.8     07-06    140  |  101  |  65<H>  ----------------------------<  96  3.7   |  24  |  2.11<H>    Ca    8.7      06 Jul 2019 06:59  Phos  3.8     07-06  Mg     2.1     07-06                  RADIOLOGY & ADDITIONAL TESTS: Feroz Cowan (Willie) PGY-1  Pager: [phone]     Patient is a 89y old  Female who presents with a chief complaint of Hypoxia (05 Jul 2019 21:43)      SUBJECTIVE / OVERNIGHT EVENTS:  No acute events over night. As per nurse, she had a lot of urine output & having good appetite.     MEDICATIONS  (STANDING):  ALBUTerol/ipratropium for Nebulization 3 milliLiter(s) Nebulizer every 6 hours  amiodarone    Tablet 100 milliGRAM(s) Oral daily  apixaban 2.5 milliGRAM(s) Oral two times a day  atorvastatin 10 milliGRAM(s) Oral at bedtime  buDESOnide   0.5 milliGRAM(s) Respule 0.5 milliGRAM(s) Inhalation every 12 hours  cefTRIAXone   IVPB 1000 milliGRAM(s) IV Intermittent every 24 hours  diltiazem    Tablet 30 milliGRAM(s) Oral every 12 hours  levETIRAcetam 250 milliGRAM(s) Oral every 12 hours  montelukast 10 milliGRAM(s) Oral daily    MEDICATIONS  (PRN):  docusate sodium 100 milliGRAM(s) Oral daily PRN Constipation  senna 2 Tablet(s) Oral at bedtime PRN Constipation          OBJECTIVE:  Vital Signs Last 24 Hrs  T(C): 36.3 (07 Jul 2019 05:06), Max: 36.8 (06 Jul 2019 13:42)  T(F): 97.3 (07 Jul 2019 05:06), Max: 98.2 (06 Jul 2019 13:42)  HR: 73 (07 Jul 2019 06:29) (70 - 78)  BP: 195/60 (07 Jul 2019 06:20) (117/63 - 195/60)  BP(mean): --  RR: 18 (07 Jul 2019 05:06) (16 - 18)  SpO2: 95% (07 Jul 2019 06:29) (90% - 100%)    PHYSICAL EXAM:  GENERAL: NAD, well-developed  HEAD:  Atraumatic, Normocephalic  EYES: EOMI, PERRLA, conjunctiva and sclera clear  NECK: Supple, No JVD  CHEST/LUNG: unable to appreciate expir. lung sounds due to grunting  HEART: Regular rate and rhythm; rubs, or gallops; holosytoluc murmur on L sternal border  ABDOMEN: Soft, Nontender, Nondistended; Bowel sounds present  EXTREMITIES:  No clubbing, cyanosis, or edema    CAPILLARY BLOOD GLUCOSE        I&O's Summary            LABS:                        9.6    11.2  )-----------( 312      ( 06 Jul 2019 06:59 )             29.8     07-06    140  |  101  |  65<H>  ----------------------------<  96  3.7   |  24  |  2.11<H>    Ca    8.7      06 Jul 2019 06:59  Phos  3.8     07-06  Mg     2.1     07-06                  RADIOLOGY & ADDITIONAL TESTS:

## 2019-07-07 NOTE — PROGRESS NOTE ADULT - PROBLEM SELECTOR PLAN 3
Acute renal failure   - DIANA on CKD stage 4. originally worsening Cr, now improved with Lasix held   - Strict I's & O's- net negative 800 cc  - Nephro consult if worsening  - avoid nephrotoxic agents; no ACE/ARB Acute renal failure   - DIANA on CKD stage 4. originally worsening Cr, now improved with Lasix held   - Strict I's & O's- net negative 820 cc  - Nephro consult if worsening  - avoid nephrotoxic agents; no ACE/ARB

## 2019-07-08 ENCOUNTER — TRANSCRIPTION ENCOUNTER (OUTPATIENT)
Age: 84
End: 2019-07-08

## 2019-07-08 VITALS
DIASTOLIC BLOOD PRESSURE: 71 MMHG | HEART RATE: 72 BPM | SYSTOLIC BLOOD PRESSURE: 128 MMHG | TEMPERATURE: 98 F | RESPIRATION RATE: 18 BRPM | OXYGEN SATURATION: 92 %

## 2019-07-08 PROCEDURE — 83605 ASSAY OF LACTIC ACID: CPT

## 2019-07-08 PROCEDURE — 94640 AIRWAY INHALATION TREATMENT: CPT

## 2019-07-08 PROCEDURE — 82435 ASSAY OF BLOOD CHLORIDE: CPT

## 2019-07-08 PROCEDURE — 83880 ASSAY OF NATRIURETIC PEPTIDE: CPT

## 2019-07-08 PROCEDURE — 82947 ASSAY GLUCOSE BLOOD QUANT: CPT

## 2019-07-08 PROCEDURE — 99232 SBSQ HOSP IP/OBS MODERATE 35: CPT

## 2019-07-08 PROCEDURE — 84100 ASSAY OF PHOSPHORUS: CPT

## 2019-07-08 PROCEDURE — 99239 HOSP IP/OBS DSCHRG MGMT >30: CPT

## 2019-07-08 PROCEDURE — 80061 LIPID PANEL: CPT

## 2019-07-08 PROCEDURE — 85027 COMPLETE CBC AUTOMATED: CPT

## 2019-07-08 PROCEDURE — 97110 THERAPEUTIC EXERCISES: CPT

## 2019-07-08 PROCEDURE — 99285 EMERGENCY DEPT VISIT HI MDM: CPT | Mod: 25

## 2019-07-08 PROCEDURE — 85014 HEMATOCRIT: CPT

## 2019-07-08 PROCEDURE — 80053 COMPREHEN METABOLIC PANEL: CPT

## 2019-07-08 PROCEDURE — 97116 GAIT TRAINING THERAPY: CPT

## 2019-07-08 PROCEDURE — 83735 ASSAY OF MAGNESIUM: CPT

## 2019-07-08 PROCEDURE — 71250 CT THORAX DX C-: CPT

## 2019-07-08 PROCEDURE — 82803 BLOOD GASES ANY COMBINATION: CPT

## 2019-07-08 PROCEDURE — 87086 URINE CULTURE/COLONY COUNT: CPT

## 2019-07-08 PROCEDURE — 80048 BASIC METABOLIC PNL TOTAL CA: CPT

## 2019-07-08 PROCEDURE — 84484 ASSAY OF TROPONIN QUANT: CPT

## 2019-07-08 PROCEDURE — 87581 M.PNEUMON DNA AMP PROBE: CPT

## 2019-07-08 PROCEDURE — 93005 ELECTROCARDIOGRAM TRACING: CPT | Mod: XU

## 2019-07-08 PROCEDURE — 93306 TTE W/DOPPLER COMPLETE: CPT | Mod: 26

## 2019-07-08 PROCEDURE — 94010 BREATHING CAPACITY TEST: CPT

## 2019-07-08 PROCEDURE — 82248 BILIRUBIN DIRECT: CPT

## 2019-07-08 PROCEDURE — 70450 CT HEAD/BRAIN W/O DYE: CPT

## 2019-07-08 PROCEDURE — 92610 EVALUATE SWALLOWING FUNCTION: CPT

## 2019-07-08 PROCEDURE — 87486 CHLMYD PNEUM DNA AMP PROBE: CPT

## 2019-07-08 PROCEDURE — 85610 PROTHROMBIN TIME: CPT

## 2019-07-08 PROCEDURE — 96374 THER/PROPH/DIAG INJ IV PUSH: CPT | Mod: XU

## 2019-07-08 PROCEDURE — 96375 TX/PRO/DX INJ NEW DRUG ADDON: CPT | Mod: XU

## 2019-07-08 PROCEDURE — 87798 DETECT AGENT NOS DNA AMP: CPT

## 2019-07-08 PROCEDURE — 72125 CT NECK SPINE W/O DYE: CPT

## 2019-07-08 PROCEDURE — 87633 RESP VIRUS 12-25 TARGETS: CPT

## 2019-07-08 PROCEDURE — 83036 HEMOGLOBIN GLYCOSYLATED A1C: CPT

## 2019-07-08 PROCEDURE — 93306 TTE W/DOPPLER COMPLETE: CPT

## 2019-07-08 PROCEDURE — 84295 ASSAY OF SERUM SODIUM: CPT

## 2019-07-08 PROCEDURE — 84132 ASSAY OF SERUM POTASSIUM: CPT

## 2019-07-08 PROCEDURE — 82330 ASSAY OF CALCIUM: CPT

## 2019-07-08 PROCEDURE — 97162 PT EVAL MOD COMPLEX 30 MIN: CPT

## 2019-07-08 PROCEDURE — 51701 INSERT BLADDER CATHETER: CPT

## 2019-07-08 PROCEDURE — 87186 SC STD MICRODIL/AGAR DIL: CPT

## 2019-07-08 PROCEDURE — 82247 BILIRUBIN TOTAL: CPT

## 2019-07-08 PROCEDURE — 85730 THROMBOPLASTIN TIME PARTIAL: CPT

## 2019-07-08 PROCEDURE — 96372 THER/PROPH/DIAG INJ SC/IM: CPT | Mod: XU

## 2019-07-08 PROCEDURE — 71045 X-RAY EXAM CHEST 1 VIEW: CPT

## 2019-07-08 PROCEDURE — 81001 URINALYSIS AUTO W/SCOPE: CPT

## 2019-07-08 RX ORDER — APIXABAN 2.5 MG/1
1 TABLET, FILM COATED ORAL
Qty: 0 | Refills: 0 | DISCHARGE
Start: 2019-07-08

## 2019-07-08 RX ADMIN — MONTELUKAST 10 MILLIGRAM(S): 4 TABLET, CHEWABLE ORAL at 11:05

## 2019-07-08 RX ADMIN — Medication 3 MILLILITER(S): at 00:27

## 2019-07-08 RX ADMIN — AMIODARONE HYDROCHLORIDE 100 MILLIGRAM(S): 400 TABLET ORAL at 05:24

## 2019-07-08 RX ADMIN — CEFTRIAXONE 100 MILLIGRAM(S): 500 INJECTION, POWDER, FOR SOLUTION INTRAMUSCULAR; INTRAVENOUS at 13:17

## 2019-07-08 RX ADMIN — LEVETIRACETAM 250 MILLIGRAM(S): 250 TABLET, FILM COATED ORAL at 05:24

## 2019-07-08 RX ADMIN — Medication 3 MILLILITER(S): at 12:02

## 2019-07-08 RX ADMIN — Medication 30 MILLIGRAM(S): at 05:24

## 2019-07-08 RX ADMIN — APIXABAN 2.5 MILLIGRAM(S): 2.5 TABLET, FILM COATED ORAL at 05:24

## 2019-07-08 NOTE — SWALLOW BEDSIDE ASSESSMENT ADULT - NS ASR SWALLOW FINDINGS DISCUS
Physician/Patient/MD Martel; BENJAMIN Jesus; HHA/Nursing
Team B MD Her; RN Bethany/Patient/Physician/Nursing

## 2019-07-08 NOTE — SWALLOW BEDSIDE ASSESSMENT ADULT - DIET PRIOR TO ADMI
Soft chopped food with ? nectar thick liquid per HHA
Soft chopped food with ? nectar thick liquid per HHA

## 2019-07-08 NOTE — PROGRESS NOTE ADULT - PROBLEM SELECTOR PROBLEM 3
COPD (chronic obstructive pulmonary disease)
DIANA (acute kidney injury)
COPD (chronic obstructive pulmonary disease)
DIANA (acute kidney injury)

## 2019-07-08 NOTE — PROGRESS NOTE ADULT - SUBJECTIVE AND OBJECTIVE BOX
CHIEF COMPLAINT: f/up sob-resp failure, RADS/copd, chf, likely osas--better over all-no cough or sob    Interval Events: none    REVIEW OF SYSTEMS:  Constitutional: No fevers or chills. No weight loss. + fatigue or generalized malaise.  Eyes: No itching or discharge from the eyes  ENT: No ear pain. No ear discharge. No nasal congestion. No post nasal drip. No epistaxis. No throat pain. No sore throat. No difficulty swallowing.   CV: No chest pain. No palpitations. No lightheadedness or dizziness.   Resp: No dyspnea at rest. No dyspnea on exertion. No orthopnea. No wheezing. No cough. No stridor. No sputum production. No chest pain with respiration.  GI: No nausea. No vomiting. No diarrhea.  MSK: No joint pain or pain in any extremities  Integumentary: No skin lesions. No pedal edema.  Neurological: + gross motor weakness. No sensory changes.  [ ] All other systems negative  [+ ] Unable to assess ROS because _not fully alert_______    OBJECTIVE:  ICU Vital Signs Last 24 Hrs  T(C): 36.9 (08 Jul 2019 04:33), Max: 36.9 (08 Jul 2019 04:33)  T(F): 98.4 (08 Jul 2019 04:33), Max: 98.4 (08 Jul 2019 04:33)  HR: 77 (08 Jul 2019 04:33) (63 - 80)  BP: 149/52 (08 Jul 2019 04:33) (133/58 - 195/60)  BP(mean): --  ABP: --  ABP(mean): --  RR: 18 (08 Jul 2019 04:33) (18 - 18)  SpO2: 94% (08 Jul 2019 04:33) (92% - 96%)        07-07 @ 07:01  -  07-08 @ 05:50  --------------------------------------------------------  IN: 0 mL / OUT: 550 mL / NET: -550 mL      CAPILLARY BLOOD GLUCOSE          PHYSICAL EXAM: NAD in bed on RA  General: Awake, alert, oriented X 1.   HEENT: Atraumatic, normocephalic.                 Mallampatti Grade 2                No nasal congestion.                No tonsillar or pharyngeal exudates.  Lymph Nodes: No palpable lymphadenopathy  Neck: No JVD. No carotid bruit.   Respiratory: Normal chest expansion                         Normal percussion                         Normal and equal air entry                         No wheeze, rhonchi or rales.  Cardiovascular: S1 S2 normal. + murmurs, rubs or gallops.   Abdomen: Soft, non-tender, non-distended. No organomegaly. Normoactive bowel sounds.  Extremities: Warm to touch. Peripheral pulse palpable. No pedal edema.   Skin: No rashes or skin lesions  Neurological: Motor and sensory examination equal and abnormal in all four extremities.  Psychiatry: Appropriate mood and affect.    HOSPITAL MEDICATIONS:  MEDICATIONS  (STANDING):  ALBUTerol/ipratropium for Nebulization 3 milliLiter(s) Nebulizer every 6 hours  amiodarone    Tablet 100 milliGRAM(s) Oral daily  apixaban 2.5 milliGRAM(s) Oral two times a day  atorvastatin 10 milliGRAM(s) Oral at bedtime  buDESOnide   0.5 milliGRAM(s) Respule 0.5 milliGRAM(s) Inhalation every 12 hours  cefTRIAXone   IVPB 1000 milliGRAM(s) IV Intermittent every 24 hours  diltiazem    Tablet 30 milliGRAM(s) Oral every 12 hours  levETIRAcetam 250 milliGRAM(s) Oral every 12 hours  montelukast 10 milliGRAM(s) Oral daily    MEDICATIONS  (PRN):  docusate sodium 100 milliGRAM(s) Oral daily PRN Constipation  senna 2 Tablet(s) Oral at bedtime PRN Constipation      LABS:                        9.3    11.2  )-----------( 312      ( 07 Jul 2019 07:05 )             28.7     07-07    140  |  101  |  62<H>  ----------------------------<  94  3.5   |  26  |  1.83<H>    Ca    8.2<L>      07 Jul 2019 07:02  Phos  3.3     07-07  Mg     2.2     07-07                MICROBIOLOGY:     RADIOLOGY:  [ ] Reviewed and interpreted by me    Point of Care Ultrasound Findings:    PFT:    EKG:

## 2019-07-08 NOTE — PROGRESS NOTE ADULT - ASSESSMENT
88 yo F former smoker (20 py) with a h/o HTN, HLD, Afib on eliquis, valvular disease (AS, AI, MS, MR), dementia (baseline oriented x 1-2, requires assistance with all ADLS, ambulates with walker) who was BIBEMS for progressive SOB, cough, weakness for the past 5 days.   Had a mechanical fall at home 5 days prior, was ambulating on her own without walker assistance, denies LOC.  Aid states that since last week has been having "cold sweats", rhonchorous sounds in her chest, and dry cough. Using her Oxygen at 2 LNC more frequently (typically PRN)  Denies LE edema, abdominal pain or dysuria.  Hypertensive on arrival, EKG normal sinus rhythm with first degree AV block.   CXR: bibasilar opacities - likely fluid  Labs significant for DIANA on CKD, hyperkalemia to 5.4, mild leukocytosis of 10.7, anemia    A/P: Hypoxemia, progressive dyspnea, imaging c/w b/l pleural effusions, adjacent mild compressive atelectasis.  1. Acute on chronic systolic heart failure, Echo from 11/2017 with mod-severe AS, severe AR and moderate MR  f/up proBNP, cardiac enzymes  -Diurese, cardiac optimization---Strict ins/outs-Repeat TTE  2.  UTI, asymptomatic in elderly/dementia -   Send for urine cultures--- antibiotics as per primary team pending CX  3. Dementia, s/p fall - CT head and c-spine - very limited studies, no gross pathology on CT head.  Fall precautions---Support and frequent orientation  4.  Hypoxemia, cough - Bilateral pleural effusions, no evidence of pneumonia on imaging.  Recommend diuresis  Duonebs, Budesonide nebs  Supplemental O2, goal sats 92-96%--Incentive spirometer and out of bed to chair as able--Acapella device to aid with airway clearance  5. DVT ppx  ******************************  7/3-stable-NC w/O2  7/8-DC of ceftriaxone (5 days)

## 2019-07-08 NOTE — PROGRESS NOTE ADULT - PROBLEM SELECTOR PROBLEM 1
Respiratory failure with hypoxia
Respiratory failure with hypoxia and hypercapnia
Respiratory failure with hypoxia
Respiratory failure with hypoxia and hypercapnia
Respiratory failure with hypoxia

## 2019-07-08 NOTE — PROGRESS NOTE ADULT - PROBLEM SELECTOR PLAN 3
Acute renal failure   - DIANA on CKD stage 4. originally worsening Cr, now improved with Lasix held   - Strict I's & O's- net negative 820 cc  - Nephro consult if worsening  - avoid nephrotoxic agents; no ACE/ARB

## 2019-07-08 NOTE — DISCHARGE NOTE NURSING/CASE MANAGEMENT/SOCIAL WORK - NSDCDPATPORTLINK_GEN_ALL_CORE
You can access the [facility] Patient Portal, offered by [facility], by registering with the following website: [email or web address]

## 2019-07-08 NOTE — PROGRESS NOTE ADULT - PROBLEM SELECTOR PLAN 8
- c/w budenoside neb BID   - PRN nicolas q6

## 2019-07-08 NOTE — PROGRESS NOTE ADULT - PROBLEM SELECTOR PLAN 1
Acute on chronic CHF exacerbation 2/2 to URI. breathing now improved with diuresis   - F/u TTE (will expedite)   - CXR showed unchanged b/l pleural effusions  - Hold lasix in the setting of up treading Cr. Cr Improving today. Acute on chronic CHF exacerbation 2/2 to URI. breathing now improved with diuresis   - TTE: Compared with echocardiogram of 11/20/2017, results are  similar  - CXR showed unchanged b/l pleural effusions  - Hold lasix in the setting of up treading Cr. Cr Improving today 1.83 from 2.11 yesterday

## 2019-07-08 NOTE — PROGRESS NOTE ADULT - ASSESSMENT
88 yo F w/ PMHx of Afib (on Eliquis), CVA, advanced dementia, Epilepsy, multiple falls, HTN, and COPD (on PRN 2L O2 at home) p.w for hypoxia found to have acute respiratory failure 2/2 to CHF exacerbation vs COPD exacerbation 2/2 to URI, now improved.

## 2019-07-08 NOTE — SWALLOW BEDSIDE ASSESSMENT ADULT - SWALLOW EVAL: RECOMMENDED FEEDING/EATING TECHNIQUES
crush medication (when feasible)/position upright (90 degrees)/ALL PO VIA TEASPOON; ENSURE TRIGGER OF PHARYNGEAL SWALLOW PRIOR TO INTRODUCING ADDITIONAL PO; VERBAL CUES TO INITIATE TRIGGER OF PHARYNGEAL SWALLOW AS NEEDED/maintain upright posture during/after eating for 30 mins/no straws/small sips/bites/allow for swallow between intakes
allow for swallow between intakes/maintain upright posture during/after eating for 30 mins/no straws/position upright (90 degrees)/small sips/bites/honey thick liquids via tsp only/oral hygiene/check mouth frequently for oral residue/pocketing/crush medication (when feasible)

## 2019-07-08 NOTE — PROGRESS NOTE ADULT - ATTENDING COMMENTS
Hospitalist- Jeffrey Martel MD  Pager: [phone]  If no response or off-hours, page [phone]  -------------------------------------  I personally performed the E/M service provided and was physically present during key portions of the service furnished by the resident/fellow. I agree with the history, physical and assessment/plan as stated above.
as above-  multifactorial resp failure--chf/AS, copd, atelectasis--O2 2 liters NC  CHF-f/up echo-diurese as able, cards f/up  COPD-duoneb q 6, pulmicort .5 bid, incentive spirometry/acapella  Aspiration precautions  ID-ceftriaxone to complete today.  DVT/GI proph  Edwin Briones MD-Pulmonary   [phone]
as above-  multifactorial resp failure--chf/AS, copd, atelectasis--O2 2 liters NC  CHF-f/up echo-diurese as able, cards f/up  COPD-duoneb q 6, pulmicort .5 bid, incentive spirometry/acapella  Aspiration precautions  ID-f/up urine cx  DVT/GI proph  Edwin Briones MD-Pulmonary   [phone]
Hospitalist- Jeffrey Martel MD  Pager: [phone]  If no response or off-hours, page [phone]  -------------------------------------  I personally performed the E/M service provided and was physically present during key portions of the service furnished by the resident/fellow. I agree with the history, physical and assessment/plan as stated above, with the following additional remarks:  - patient with viral uri- now resolving. BAck to baseline home o2 requirements.  - mod/severe AS and severe AR- patient aggressively diuresed upon admission due to concern for possible ADHF however BNP now decreasing and Cr uptrending, concerning for overdiuresis. Will hold lasix for now and continue monitoring Cr.  - dispo: pending PT recs.
Acute hypoxic respiratory failure likely 2/2 CHF exacerbation in setting of viral URI. s/p IV lasix with improvement. TTE without significant changes from the previous study. Pt now back to baseline, with no respiratory distress on RA. S/S recommends dysphagia diet and daughter received education. Pt is otherwise stable for discharge with close follow up with PMD.     47 minutes spent on discharge process    Nicole Silverman MD  Division of Hospital Medicine  Pager: [phone]  Office: [phone]
Hospitalist- Jeffrey Martel MD  Pager: [phone]  If no response or off-hours, page [phone]  -------------------------------------  I personally performed the E/M service provided and was physically present during key portions of the service furnished by the resident/fellow. I agree with the history, physical and assessment/plan as stated above, with the following additional remarks:  - Pt back to mental baseline, on baseline home o2 requirements, not in heart failure.  - viral URI: resolved  - UTI: to complete abx tomorrow  - dispo: likely home with home PT pending TTE
Hospitalist- Jeffrey Martel MD  Pager: [phone]  If no response or off-hours, page [phone]  -------------------------------------  I personally performed the E/M service provided and was physically present during key portions of the service furnished by the resident/fellow. I agree with the history, physical and assessment/plan as stated above.

## 2019-07-08 NOTE — SWALLOW BEDSIDE ASSESSMENT ADULT - SWALLOW EVAL: RECOMMENDED DIET
Dysphagia 1 with honey thick liquid with 100% assistance/supervision. ALL PO VIA TEASPOON
Dysphagia 1, honey

## 2019-07-08 NOTE — PROGRESS NOTE ADULT - PROBLEM SELECTOR PROBLEM 2
CHF (congestive heart failure)
Upper respiratory infection, viral
CHF (congestive heart failure)
Upper respiratory infection, viral

## 2019-07-08 NOTE — PROGRESS NOTE ADULT - PROVIDER SPECIALTY LIST ADULT
Internal Medicine
Pulmonology
Pulmonology
Internal Medicine
Internal Medicine

## 2019-07-08 NOTE — SWALLOW BEDSIDE ASSESSMENT ADULT - PHARYNGEAL PHASE
intermittent verbal/tactile cues to promote timely swallow/Delayed pharyngeal swallow Delayed pharyngeal swallow Delayed pharyngeal swallow/Delayed cough post oral intake

## 2019-07-08 NOTE — SWALLOW BEDSIDE ASSESSMENT ADULT - COMMENTS
Hx cont: CT head and cervical neck non-diagnostic 2/2 pt moving. Repeat head CT neg. CT chest: Moderate-sized bilateral pleural effusions with bibasilar atelectasis. Hypoxia 2/2 b/l pleural effusions likely from cardiac etiology.  Acute hypoxic respiratory failure less likely 2/2 PNA as pt not presenting w/ fever, significant cough; s/p 1 dose of zosyn in ED.  Will not c/w zosyn. + DIANA, + hyperkalemia - Resolved. + Acute on chronic decompensated systolic heart failure -- secondary to valvulopathies. Pulm on board. Multifactorial resp failure--chf/AS, copd, atelectasis--O2 2 liters NC. Recommend diuresis, Duonebs, Budesonide nebs. Medicine: acute respiratory failure 2/2 to CHF exacerbation vs COPD exacerbation 2/2 to URI. + Bacteria in urine->started CTX 1g (7/4).  Swallow history: Pt seen for bedside swallow evaluation 9/27/14 and presented with an oral dysphagia. Recommendations included a soft texture diet with thin liquid. Bedside evaluation this admission on 7/5/19 with recommendation for Dysphagia 1, honey via tsp with full assist for feeding.

## 2019-07-08 NOTE — PROGRESS NOTE ADULT - SUBJECTIVE AND OBJECTIVE BOX
Vishnu Merritt MD, PGY1  [phone]        Patient is a 89y old  Female who presents with a chief complaint of sob (08 Jul 2019 05:49)        SUBJECTIVE / OVERNIGHT EVENTS: Patient had no acute events overnight. Patient seen and examined at bedside this morning.     ROS: [ - ] Fever [ - ] Chills [ - ] Nausea/Vomiting [ - ] Chest Pain [ - ] Shortness of breath     MEDICATIONS  (STANDING):  ALBUTerol/ipratropium for Nebulization 3 milliLiter(s) Nebulizer every 6 hours  amiodarone    Tablet 100 milliGRAM(s) Oral daily  apixaban 2.5 milliGRAM(s) Oral two times a day  atorvastatin 10 milliGRAM(s) Oral at bedtime  buDESOnide   0.5 milliGRAM(s) Respule 0.5 milliGRAM(s) Inhalation every 12 hours  cefTRIAXone   IVPB 1000 milliGRAM(s) IV Intermittent every 24 hours  diltiazem    Tablet 30 milliGRAM(s) Oral every 12 hours  levETIRAcetam 250 milliGRAM(s) Oral every 12 hours  montelukast 10 milliGRAM(s) Oral daily    MEDICATIONS  (PRN):  docusate sodium 100 milliGRAM(s) Oral daily PRN Constipation  senna 2 Tablet(s) Oral at bedtime PRN Constipation      Vital Signs Last 24 Hrs  T(C): 36.9 (08 Jul 2019 04:33), Max: 36.9 (08 Jul 2019 04:33)  T(F): 98.4 (08 Jul 2019 04:33), Max: 98.4 (08 Jul 2019 04:33)  HR: 77 (08 Jul 2019 04:33) (63 - 80)  BP: 149/52 (08 Jul 2019 04:33) (133/58 - 195/60)  BP(mean): --  RR: 18 (08 Jul 2019 04:33) (18 - 18)  SpO2: 94% (08 Jul 2019 04:33) (92% - 96%)  CAPILLARY BLOOD GLUCOSE        I&O's Summary    07 Jul 2019 07:01  -  08 Jul 2019 06:04  --------------------------------------------------------  IN: 0 mL / OUT: 550 mL / NET: -550 mL        PHYSICAL EXAM  GENERAL: NAD, lying comfortably in bed   HEAD:  Atraumatic, Normocephalic  EYES: EOMI, PERRLA, conjunctiva and sclera clear  NECK: Supple, No JVD  CHEST/LUNG: Clear to auscultation bilaterally; No wheeze  HEART: Regular rate and rhythm; No murmurs, rubs, or gallops  ABDOMEN: Soft, Nontender, Nondistended; Bowel sounds present  EXTREMITIES:  2+ Peripheral Pulses, No clubbing, cyanosis, or edema  NEURO: AAOx3, non-focal  SKIN: No rashes or lesions      LABS:                        9.3    11.2  )-----------( 312      ( 07 Jul 2019 07:05 )             28.7     07-07    140  |  101  |  62<H>  ----------------------------<  94  3.5   |  26  |  1.83<H>    Ca    8.2<L>      07 Jul 2019 07:02  Phos  3.3     07-07  Mg     2.2     07-07                  RADIOLOGY & ADDITIONAL TESTS:    Imaging Personally Reviewed:  Consultant(s) Notes Reviewed: Vishnu Merritt MD, PGY1  [phone]        Patient is a 89y old  Female who presents with a chief complaint of sob (08 Jul 2019 05:49)        SUBJECTIVE / OVERNIGHT EVENTS: Patient had no acute events overnight. Patient seen and examined at bedside this morning. Aid is with her in the room. She is sitting comfortably in bed with no difficulties breathing. Denies chest pain, SOB, Nausea, vomiting, diarrhea. Also able to ambulate with assistance with PT along the hallway. Spoke to her daughter on the phone who wanted another s/s study who did not change recommendations for discharge       MEDICATIONS  (STANDING):  ALBUTerol/ipratropium for Nebulization 3 milliLiter(s) Nebulizer every 6 hours  amiodarone    Tablet 100 milliGRAM(s) Oral daily  apixaban 2.5 milliGRAM(s) Oral two times a day  atorvastatin 10 milliGRAM(s) Oral at bedtime  buDESOnide   0.5 milliGRAM(s) Respule 0.5 milliGRAM(s) Inhalation every 12 hours  cefTRIAXone   IVPB 1000 milliGRAM(s) IV Intermittent every 24 hours  diltiazem    Tablet 30 milliGRAM(s) Oral every 12 hours  levETIRAcetam 250 milliGRAM(s) Oral every 12 hours  montelukast 10 milliGRAM(s) Oral daily    MEDICATIONS  (PRN):  docusate sodium 100 milliGRAM(s) Oral daily PRN Constipation  senna 2 Tablet(s) Oral at bedtime PRN Constipation      Vital Signs Last 24 Hrs  T(C): 36.9 (08 Jul 2019 04:33), Max: 36.9 (08 Jul 2019 04:33)  T(F): 98.4 (08 Jul 2019 04:33), Max: 98.4 (08 Jul 2019 04:33)  HR: 77 (08 Jul 2019 04:33) (63 - 80)  BP: 149/52 (08 Jul 2019 04:33) (133/58 - 195/60)  BP(mean): --  RR: 18 (08 Jul 2019 04:33) (18 - 18)  SpO2: 94% (08 Jul 2019 04:33) (92% - 96%)  CAPILLARY BLOOD GLUCOSE        I&O's Summary    07 Jul 2019 07:01  -  08 Jul 2019 06:04  --------------------------------------------------------  IN: 0 mL / OUT: 550 mL / NET: -550 mL        PHYSICAL EXAM  GENERAL: NAD, lying comfortably in bed   HEAD:  Atraumatic, Normocephalic  EYES: EOMI, PERRLA, conjunctiva and sclera clear  NECK: Supple, No JVD  CHEST/LUNG: Clear to auscultation bilaterally; No wheeze  HEART: Regular rate and rhythm; No murmurs, rubs, or gallops  ABDOMEN: Soft, Nontender, Nondistended; Bowel sounds present  EXTREMITIES:  2+ Peripheral Pulses, No clubbing, cyanosis, or edema  NEURO: AAOx3, non-focal  SKIN: No rashes or lesions      LABS:                        9.3    11.2  )-----------( 312      ( 07 Jul 2019 07:05 )             28.7     07-07    140  |  101  |  62<H>  ----------------------------<  94  3.5   |  26  |  1.83<H>    Ca    8.2<L>      07 Jul 2019 07:02  Phos  3.3     07-07  Mg     2.2     07-07                  RADIOLOGY & ADDITIONAL TESTS:    Imaging Personally Reviewed:  Consultant(s) Notes Reviewed:

## 2019-07-08 NOTE — PROGRESS NOTE ADULT - PROBLEM SELECTOR PLAN 10
- DVT ppx: Eliquis 2.5 mg BID  - Dementia: Asp. precautions  - Diet: dysphagia 1 w/ honey consistency. speech & swallow recommended PO via teaspoon  - Seizure dx: c/w keppra 250 BID  - GOD: HCP: Her daughter Rachel: # [phone]. MOLST form signed 7/5  - Dispo: Home with home PT; pending TTE

## 2019-07-08 NOTE — SWALLOW BEDSIDE ASSESSMENT ADULT - SLP GENERAL OBSERVATIONS
Pt received OOB in chair. +2L NC. A&Ox1 (to self). + wet breath sounds at baseline. + verbal responsiveness. Paucity of verbal output. Pleasant and cooperative for exam. Poor command following. + confusion.
Pt positioned upright in bed. Private aide at bedside reports patient often holding/pocketing. Aide provided with swallow strategies to facilitate a-p transit and oral clearance (slight lingual pressure with empty spoon, verbal cues, etc.)

## 2019-07-08 NOTE — SWALLOW BEDSIDE ASSESSMENT ADULT - ASR SWALLOW ASPIRATION MONITOR
cough/change of breathing pattern/Monitor for s/s aspiration/laryngeal penetration. If noted:  D/C p.o. intake, provide non-oral nutrition/hydration/meds, and contact this service @ [phone]/fever/pneumonia/throat clearing/upper respiratory infection/gurgly voice
cough/upper respiratory infection/pneumonia/change of breathing pattern/fever/Monitor for s/s aspiration/laryngeal penetration. If noted:  D/C p.o. intake, provide non-oral nutrition/hydration/meds, and contact this service @ [phone]/throat clearing/gurgly voice

## 2019-07-08 NOTE — SWALLOW BEDSIDE ASSESSMENT ADULT - SWALLOW EVAL: DIAGNOSIS
Patient continues to present with oropharyngeal dysphagia. Oral holding noted across consistencies with intermittent verbal cues needed to initiate a-p transit. No overt signs of laryngeal penetration on honey thickened liquids via tsp. Delayed cough noted for nectar tsp, suggestive of laryngeal penetration/aspiration. No overt signs noted on purees. Solids deferred as patient c/o "i'm sleep"; c/o "no more". Private aide educated in re: aspiration precautions/safe feeding guidelines with verbalized understanding indicated.
Pt presents with an oral and suspected pharyngeal dysphagia characterized by prolonged but efficient mastication of chewables, delayed oral transit time, reduced A-P transport, suspected premature spillover, delay in trigger of the pharyngeal swallow at times requiring verbal cueing to initiate swallow trigger (suspect behavioral component as pt vocalizing), audible swallows with nectar thick liquid and mechanical soft texture and overt s/s laryngeal penetration/aspiration with nectar thick liquid. Pt with limited trials of mechanical soft texture reporting satiety as trials continued.

## 2019-07-09 ENCOUNTER — APPOINTMENT (OUTPATIENT)
Dept: HOME HEALTH SERVICES | Facility: HOME HEALTH | Age: 84
End: 2019-07-09

## 2019-07-09 VITALS
SYSTOLIC BLOOD PRESSURE: 112 MMHG | OXYGEN SATURATION: 94 % | TEMPERATURE: 97.8 F | RESPIRATION RATE: 14 BRPM | HEART RATE: 60 BPM | DIASTOLIC BLOOD PRESSURE: 70 MMHG

## 2019-07-11 ENCOUNTER — MEDICATION RENEWAL (OUTPATIENT)
Age: 84
End: 2019-07-11

## 2019-07-15 ENCOUNTER — APPOINTMENT (OUTPATIENT)
Dept: PULMONOLOGY | Facility: CLINIC | Age: 84
End: 2019-07-15
Payer: MEDICARE

## 2019-07-15 VITALS
HEART RATE: 58 BPM | RESPIRATION RATE: 16 BRPM | SYSTOLIC BLOOD PRESSURE: 110 MMHG | DIASTOLIC BLOOD PRESSURE: 60 MMHG | OXYGEN SATURATION: 94 %

## 2019-07-15 DIAGNOSIS — E55.9 VITAMIN D DEFICIENCY, UNSPECIFIED: ICD-10-CM

## 2019-07-15 DIAGNOSIS — R06.02 SHORTNESS OF BREATH: ICD-10-CM

## 2019-07-15 PROCEDURE — 99214 OFFICE O/P EST MOD 30 MIN: CPT | Mod: 25

## 2019-07-15 PROCEDURE — 71046 X-RAY EXAM CHEST 2 VIEWS: CPT

## 2019-07-15 NOTE — ADDENDUM
[FreeTextEntry1] : Documented by Roxi Velarde acting as a scribe for Dr. Edwin Briones on 7/15/2019.\par \par All medical record entries made by the scribe, Roxi Velarde, were at my, Dr. Edwin Briones's, direction and personally dictated by me on 7/15/2019. I have reviewed the chart and agree that the record accurately reflects my personal performance of the history, physical exam, assessment and plan. I have also personally directed, reviewed, and agree with the discharge instructions.

## 2019-07-15 NOTE — ASSESSMENT
[FreeTextEntry1] : Ms. Cruz is a 88 year old female with a history of asthma / COPD / CHF / ?OSAS - She is currently stable from a pulmonary perspective. s/p hospitalization. \par \par Problem 1: Chronic bronchitis \par -Acapella device \par You have a clinical scenario most c/w acute bronchitis the etiology of which is unknown but empiric antibiotics are indicated. Hydration, mucolytics including Mucinex, Robitussin and the like are indicated. Cough controlling agents will be needed. \par \par Problem 2: mild asthma/ COPD \par -continue Nebulizer with Xopenex 1.25 BID (first)\par -continue Pulmicort via nebulizer 0.5 BID (second)\par -continue Singulair 10 mg before bed \par **Xopenex followed by Pulmicort**\par \par -Asthma is  believed to be caused by inherited (genetic) and environmental factor, but its exact cause is unknown. Asthma may be triggered by allergens, lung infections, or irritants in the air. Asthma triggers are different for each person\par -Inhaler technique reviewed as well as oral hygiene techniques reviewed with patient. Avoidance of cold air, extremes of temperature, rescue inhaler should be used before exercise. Order of medication reviewed with patient. Recommended use of a cool mist humidifier in the bedroom. \par \par Problem 3: atelectasis\par -encouraged her to work on her breathing techniques and to sit up more \par \par Problem 4: Poor mechanics of breathing \par -Proper breathing techniques were reviewed with an emphasis of exhalation. Patient instructed to breath in for 1 second and out for four seconds. Patient was encouraged to not talk while walking. \par \par Problem 5: Pleural effusion, resolved \par -Gone at this time as per her recent CT scan \par \par Problem 6: Low vitamin D\par - Has been associated with asthma exacerbations and increased allergic symptoms. The goal based on recent information is maintaining levels between 50-70 and low normal is 30. Recommended 50,000 units every two weeks to once a month depending on the level.\par \par Problem 7: ? OSAS (refused Rx) \par -Home sleep study due to EDS snoring, elevated MP and fatigue; chin-strap recommended. \par -Discussed the risks/associations with coronary artery disease, atrial fibrillation, arrhythmia, memory loss, issues with concentration, stroke risk, hypertension, nocturia, chronic reflux/Jimenez’s esophagus some but not all inclusive. Treatment options discussed including CPAP/BiPAP machine, oral appliance, ProVent therapy, Oxy-Aid by [employer]pite[employer], new technologies, or positional sleep.\par \par problem 8: Chronic respiratory failure. \par -supplemental O2 at home \par \par problem 9: health maintenance \par -recommended yearly flu shot - 2018\par -recommended strep pneumonia vaccines: Prevnar-13 vaccine, followed by Pneumo vaccine 23 one year following\par -recommended early intervention for Upper Respiratory Infections (URIs)\par -recommended regular osteoporosis evaluations\par -recommended early dermatological evaluations\par -recommended after the age of 50 to the age of 70, colonoscopy every 5 years \par \par F/u in 3-4 months \par pt is encouraged to call or fax the office with any questions or concerns. \par Pt is to exercise and diet to promote a healthy weight. \par Explained to the pt in full detail with demonstrations how to use the inhalers a

## 2019-07-15 NOTE — REASON FOR VISIT
[Follow-Up] : a follow-up visit [Formal Caregiver] : formal caregiver [Family Member] : family member [FreeTextEntry1] : moderate persistent RAD, chronic bronchitis, CHF, vitamin D deficiency

## 2019-07-15 NOTE — PHYSICAL EXAM

## 2019-07-16 ENCOUNTER — MEDICATION RENEWAL (OUTPATIENT)
Age: 84
End: 2019-07-16

## 2019-07-17 ENCOUNTER — APPOINTMENT (OUTPATIENT)
Dept: HOME HEALTH SERVICES | Facility: HOME HEALTH | Age: 84
End: 2019-07-17
Payer: MEDICARE

## 2019-07-17 VITALS
OXYGEN SATURATION: 93 % | RESPIRATION RATE: 17 BRPM | SYSTOLIC BLOOD PRESSURE: 95 MMHG | TEMPERATURE: 97.2 F | HEART RATE: 67 BPM | DIASTOLIC BLOOD PRESSURE: 65 MMHG

## 2019-07-17 DIAGNOSIS — F32.9 MAJOR DEPRESSIVE DISORDER, SINGLE EPISODE, UNSPECIFIED: ICD-10-CM

## 2019-07-17 DIAGNOSIS — Z71.89 OTHER SPECIFIED COUNSELING: ICD-10-CM

## 2019-07-17 DIAGNOSIS — S90.819A ABRASION, UNSPECIFIED FOOT, INITIAL ENCOUNTER: ICD-10-CM

## 2019-07-17 DIAGNOSIS — I10 ESSENTIAL (PRIMARY) HYPERTENSION: ICD-10-CM

## 2019-07-17 PROCEDURE — 99495 TRANSJ CARE MGMT MOD F2F 14D: CPT

## 2019-07-17 NOTE — PHYSICAL EXAM
[No Acute Distress] : no acute distress [PERRL] : pupils equal, round and reactive to light [EOMI] : extra ocular movement intact [Normal Oropharynx] : the oropharynx was normal [Normal Nasal Mucosa] : the nasal mucosa was normal [No JVD] : no jugular venous distention [Supple] : the neck was supple [No Respiratory Distress] : no respiratory distress [Clear to Auscultation] : lungs were clear to auscultation bilaterally [No Accessory Muscle Use] : no accessory muscle use [Normal Rate] : heart rate was normal  [Normal S1, S2] : normal S1 and S2 [No Edema] : there was no peripheral edema [Normal Bowel Sounds] : normal bowel sounds [Non Tender] : non-tender [Soft] : abdomen soft [Not Distended] : not distended [No Spinal Tenderness] : no spinal tenderness [Kyphosis] :  kyphosis present [No Clubbing, Cyanosis] : no clubbing  or cyanosis of the fingernails [No Rash] : no rash [No Skin Lesions] : no skin lesions [No Motor Deficits] : the motor exam was normal [No Gross Sensory Deficits] : no gross sensory deficits [Normal Affect] : the affect was normal [de-identified] : sleeping, easily arousable, cachectic [de-identified] : poor dentition [de-identified] : 2/6 systolic murmur, irregular rhythm [de-identified] : full [de-identified] : arthritic joints, walks with walker, no deformity or shortening of legs [de-identified] : small blanchable red spots at sacrum, no openings, small skin abrasion left elbow [de-identified] : alert, awake, not oriented to person date or time, oriented to place pleasant

## 2019-07-17 NOTE — COUNSELING
[Improve mobility] : improve mobility [Decrease stress] : decrease stress [Decrease hospital use] : decrease hospital use [Minimize unnecessary interventions] : minimize unnecessary interventions [Maintain functional ability] : maintain functional ability [Discussed disease trajectory with patient/caregiver] : discussed disease trajectory with patient/caregiver [Likely to achieve goals/desired outcomes] : likely to achieve goals/desired outcomes [Patient/Caregiver has ___ understanding of disease process] : patient/caregiver has [unfilled] understanding of disease process [Completed Healthcare Proxy] : completed healthcare proxy [Completed DNR] : completed DNR [Completed Medical Orders for Life-Sustaining Treatment] : completed medical orders for life-sustaining treatment [DNR] : Code Status: DNR [Limited] : Treatment Guidelines: Limited [DNI] : Intubation: DNI [[facility]: [facility]: [unfilled] [_____] : HCP: [unfilled] [Normal Weight (BMI <25)] : normal weight - BMI <25 [Weight counseling provided] : Weight counseling provided [Mediterranean diet recommended] : Mediterranean diet recommended [Sodium restriction 2gm recommended] : Sodium restriction 2 gm recommended [Medical/Nutritional supplementation as prescribed] : medical/nutritional supplementation as prescribed [Non - Smoker] : non-smoker [Medical alert] : medical alert [Use assistive device to avoid falls] : use assistive device to avoid falls [Remove clutter and unsafe carpeting to avoid falls] : remove clutter and unsafe carpeting to avoid falls [Use grab bars] : use grab bars [Smoke/CO Detectors] : smoke/CO detectors [Vaccinations: _______] : Vaccinations: [unfilled] [Not Indicated] : not indicated [FreeTextEntry3] : .

## 2019-07-17 NOTE — REASON FOR VISIT
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

## 2019-07-17 NOTE — HISTORY OF PRESENT ILLNESS
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 she was not eating at all last week, however has regained appetite. using oxygen at night and while exertion. HHA monitor oxygen via pulse oximetry.\par walking was affected a lot after hospital visit. was not walking at all, however now trying to ambulate with max assistance. she also had fallen before going in to ER with head and forearm injury.\par denies fever, chills, nausea, vomiting, any urinary complaints.\par eating and drinking has improved, has dysphagia, using thickeners for liquids and pureed food\par no h/o aspiration pneumonia\par Weight has been stable with progressive muscle mass loss\par BM is regular on miralax, last BM this morning\par incontinence to bladder and stool, wears diaper.\par has sacral redness, not using any creams currently, \par memory- progressive dementia, not oriented to time and date\par Mood is ok,  Sleeping well at night, and almost whole day\par -----------\par lives with A 24/7\par daughter Rachel pre-pours meds

## 2019-07-25 ENCOUNTER — MEDICATION RENEWAL (OUTPATIENT)
Age: 84
End: 2019-07-25

## 2019-08-02 ENCOUNTER — CLINICAL ADVICE (OUTPATIENT)
Age: 84
End: 2019-08-02

## 2019-08-07 NOTE — PATIENT PROFILE ADULT - NSPROGENBLOODRESTRICT_GEN_A_NUR
Donna Parmer Patient Age: 52 year old  MESSAGE:   Patient is asking if she had t-b test last year, please advise, transferred  To [facility]     WEIGHT AND HEIGHT:   Wt Readings from Last 1 Encounters:   07/29/19 83.5 kg (184 lb)     Ht Readings from Last 1 Encounters:   07/29/19 5' 7\" (1.702 m)     BMI Readings from Last 1 Encounters:   07/29/19 28.82 kg/m²       ALLERGIES: no known allergies.  Current Outpatient Medications   Medication   • Ferrous Sulfate (IRON) 325 (65 Fe) MG Tab   • cyanocobalamin (VITAMIN B-12) 100 MCG tablet   • Na Sulfate-K Sulfate-Mg Sulf (SUPREP BOWEL PREP KIT) 17.5-3.13-1.6 GM/177ML Solution   • magnesium citrate solution   • simethicone (MYLICON) 125 MG chewable tablet   • bisacodyl (DULCOLAX) 5 MG EC tablet   • clobetasol (TEMOVATE) 0.05 % ointment     No current facility-administered medications for this visit.      PHARMACY to use:        Pharmacy preference(s) on file:   [id number] - [place], [place] - [address] AT [facility] [address]  [facility] [address]  Phone: [phone] Fax: [phone]      CALL BACK INFO: Ok to leave response (including medical information) with family member or on answering machine  ROUTING: Patient's physician/staff        PCP: Daniel Mathew DO         INS: Payor: [employer] [place] / Plan: PPO [id number] / Product Type: PPO MISC   PATIENT ADDRESS:  [address] [place] [id number]    
Pt's last TB test 2008. 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 Clinical visit scheduled.   
none

## 2019-08-15 ENCOUNTER — MEDICATION RENEWAL (OUTPATIENT)
Age: 84
End: 2019-08-15

## 2019-08-26 ENCOUNTER — TRANSCRIPTION ENCOUNTER (OUTPATIENT)
Age: 84
End: 2019-08-26

## 2019-08-30 ENCOUNTER — TRANSCRIPTION ENCOUNTER (OUTPATIENT)
Age: 84
End: 2019-08-30

## 2019-09-10 ENCOUNTER — TRANSCRIPTION ENCOUNTER (OUTPATIENT)
Age: 84
End: 2019-09-10

## 2019-09-12 ENCOUNTER — APPOINTMENT (OUTPATIENT)
Dept: HOME HEALTH SERVICES | Facility: HOME HEALTH | Age: 84
End: 2019-09-12
Payer: MEDICARE

## 2019-09-12 VITALS
TEMPERATURE: 98.3 F | SYSTOLIC BLOOD PRESSURE: 120 MMHG | HEART RATE: 82 BPM | DIASTOLIC BLOOD PRESSURE: 75 MMHG | RESPIRATION RATE: 16 BRPM | OXYGEN SATURATION: 91 %

## 2019-09-12 DIAGNOSIS — J44.9 CHRONIC OBSTRUCTIVE PULMONARY DISEASE, UNSPECIFIED: ICD-10-CM

## 2019-09-12 DIAGNOSIS — J96.11 CHRONIC RESPIRATORY FAILURE WITH HYPOXIA: ICD-10-CM

## 2019-09-12 DIAGNOSIS — J41.1 MUCOPURULENT CHRONIC BRONCHITIS: ICD-10-CM

## 2019-09-12 DIAGNOSIS — F02.80 ALZHEIMER'S DISEASE WITH LATE ONSET: ICD-10-CM

## 2019-09-12 DIAGNOSIS — G30.1 ALZHEIMER'S DISEASE WITH LATE ONSET: ICD-10-CM

## 2019-09-12 DIAGNOSIS — I50.9 HEART FAILURE, UNSPECIFIED: ICD-10-CM

## 2019-09-12 DIAGNOSIS — K59.09 OTHER CONSTIPATION: ICD-10-CM

## 2019-09-12 DIAGNOSIS — I48.91 UNSPECIFIED ATRIAL FIBRILLATION: ICD-10-CM

## 2019-09-12 DIAGNOSIS — N18.4 CHRONIC KIDNEY DISEASE, STAGE 4 (SEVERE): ICD-10-CM

## 2019-09-12 PROCEDURE — 99350 HOME/RES VST EST HIGH MDM 60: CPT

## 2019-09-12 RX ORDER — STARCH
POWDER (GRAM) ORAL
Qty: 1 | Refills: 5 | Status: ACTIVE | COMMUNITY
Start: 2018-08-14

## 2019-09-12 RX ORDER — MONTELUKAST 10 MG/1
10 TABLET, FILM COATED ORAL
Qty: 1 | Refills: 3 | Status: ACTIVE | COMMUNITY
Start: 2017-05-04

## 2019-09-12 RX ORDER — AMIODARONE HYDROCHLORIDE 200 MG/1
200 TABLET ORAL
Qty: 90 | Refills: 3 | Status: ACTIVE | COMMUNITY
Start: 2018-07-09

## 2019-09-12 RX ORDER — DILTIAZEM HYDROCHLORIDE 60 MG/1
60 TABLET ORAL
Qty: 180 | Refills: 3 | Status: ACTIVE | COMMUNITY
Start: 2017-01-25

## 2019-09-12 RX ORDER — MULTIVITAMIN WITH FOLIC ACID 400 MCG
TABLET ORAL
Refills: 0 | Status: ACTIVE | COMMUNITY
Start: 2017-12-22

## 2019-09-12 RX ORDER — POLYETHYLENE GLYCOL 3350 17 G/17G
17 POWDER, FOR SOLUTION ORAL DAILY
Qty: 1 | Refills: 3 | Status: ACTIVE | COMMUNITY
Start: 2019-01-04

## 2019-09-12 RX ORDER — APIXABAN 2.5 MG/1
2.5 TABLET, FILM COATED ORAL
Qty: 180 | Refills: 1 | Status: ACTIVE | COMMUNITY
Start: 2017-12-22

## 2019-09-12 RX ORDER — LACTULOSE 10 G/15ML
10 SOLUTION ORAL
Qty: 90 | Refills: 6 | Status: ACTIVE | COMMUNITY
Start: 2019-08-02

## 2019-09-12 RX ORDER — LEVALBUTEROL HYDROCHLORIDE 0.63 MG/3ML
0.63 SOLUTION RESPIRATORY (INHALATION)
Qty: 120 | Refills: 3 | Status: ACTIVE | COMMUNITY
Start: 2019-07-17

## 2019-09-12 RX ORDER — WHITE PETROLATUM 1.75 OZ
OINTMENT TOPICAL TWICE DAILY
Qty: 1 | Refills: 1 | Status: ACTIVE | COMMUNITY
Start: 2017-06-08

## 2019-09-12 RX ORDER — LORATADINE 5 MG
17 TABLET,CHEWABLE ORAL
Refills: 0 | Status: DISCONTINUED | COMMUNITY
Start: 2018-08-14 | End: 2019-09-12

## 2019-09-12 RX ORDER — SENNOSIDES 8.6 MG TABLETS 8.6 MG/1
8.6 TABLET ORAL
Qty: 60 | Refills: 3 | Status: ACTIVE | COMMUNITY
Start: 2019-08-01

## 2019-09-27 ENCOUNTER — RX RENEWAL (OUTPATIENT)
Age: 84
End: 2019-09-27

## 2019-10-07 ENCOUNTER — TRANSCRIPTION ENCOUNTER (OUTPATIENT)
Age: 84
End: 2019-10-07

## 2019-10-07 ENCOUNTER — OTHER (OUTPATIENT)
Age: 84
End: 2019-10-07

## 2019-10-07 ENCOUNTER — CLINICAL ADVICE (OUTPATIENT)
Age: 84
End: 2019-10-07

## 2019-10-07 DIAGNOSIS — R41.82 ALTERED MENTAL STATUS, UNSPECIFIED: ICD-10-CM

## 2019-10-07 DIAGNOSIS — I77.1 STRICTURE OF ARTERY: ICD-10-CM

## 2019-10-07 DIAGNOSIS — I70.0 ATHEROSCLEROSIS OF AORTA: ICD-10-CM

## 2019-10-08 ENCOUNTER — TRANSCRIPTION ENCOUNTER (OUTPATIENT)
Age: 84
End: 2019-10-08

## 2019-10-08 LAB
ALBUMIN SERPL ELPH-MCNC: 3.7 G/DL
ALP BLD-CCNC: 76 U/L
ALT SERPL-CCNC: 7 U/L
ANION GAP SERPL CALC-SCNC: 17 MMOL/L
AST SERPL-CCNC: 16 U/L
BASOPHILS # BLD AUTO: 0.07 K/UL
BASOPHILS NFR BLD AUTO: 0.7 %
BILIRUB SERPL-MCNC: 0.2 MG/DL
BUN SERPL-MCNC: 139 MG/DL
CALCIUM SERPL-MCNC: 9 MG/DL
CHLORIDE SERPL-SCNC: 130 MMOL/L
CO2 SERPL-SCNC: 26 MMOL/L
CREAT SERPL-MCNC: 3.39 MG/DL
EOSINOPHIL # BLD AUTO: 0.65 K/UL
EOSINOPHIL NFR BLD AUTO: 6.1 %
HCT VFR BLD CALC: 33.3 %
HGB BLD-MCNC: 9 G/DL
IMM GRANULOCYTES NFR BLD AUTO: 0.6 %
LYMPHOCYTES # BLD AUTO: 1.98 K/UL
LYMPHOCYTES NFR BLD AUTO: 18.6 %
MAN DIFF?: NORMAL
MCHC RBC-ENTMCNC: 24.1 PG
MCHC RBC-ENTMCNC: 27 GM/DL
MCV RBC AUTO: 89.3 FL
MONOCYTES # BLD AUTO: 0.66 K/UL
MONOCYTES NFR BLD AUTO: 6.2 %
NEUTROPHILS # BLD AUTO: 7.24 K/UL
NEUTROPHILS NFR BLD AUTO: 67.8 %
PLATELET # BLD AUTO: 340 K/UL
POTASSIUM SERPL-SCNC: 4.7 MMOL/L
PROT SERPL-MCNC: 7.4 G/DL
RBC # BLD: 3.73 M/UL
RBC # FLD: 19.6 %
SODIUM SERPL-SCNC: 172 MMOL/L
TSH SERPL-ACNC: 2.2 UIU/ML
WBC # FLD AUTO: 10.66 K/UL

## 2019-10-08 RX ORDER — ATORVASTATIN CALCIUM 10 MG/1
10 TABLET, FILM COATED ORAL
Qty: 90 | Refills: 3 | Status: DISCONTINUED | COMMUNITY
Start: 2018-11-27 | End: 2019-10-08

## 2019-10-08 RX ORDER — FUROSEMIDE 20 MG/1
20 TABLET ORAL
Qty: 45 | Refills: 3 | Status: DISCONTINUED | COMMUNITY
Start: 2017-12-26 | End: 2019-10-08

## 2019-10-10 ENCOUNTER — TRANSCRIPTION ENCOUNTER (OUTPATIENT)
Age: 84
End: 2019-10-10

## 2019-10-14 ENCOUNTER — CHART COPY (OUTPATIENT)
Age: 84
End: 2019-10-14

## 2019-10-15 ENCOUNTER — TRANSCRIPTION ENCOUNTER (OUTPATIENT)
Age: 84
End: 2019-10-15

## 2019-10-18 ENCOUNTER — TRANSCRIPTION ENCOUNTER (OUTPATIENT)
Age: 84
End: 2019-10-18

## 2019-11-05 NOTE — PROGRESS NOTE ADULT - PROBLEM SELECTOR PLAN 2
as per cards-work up completed Opioid Counseling: I discussed with the patient the potential side effects of opioids including but not limited to addiction, altered mental status, and depression. I stressed avoiding alcohol, benzodiazepines, muscle relaxants and sleep aids unless specifically okayed by a physician. The patient verbalized understanding of the proper use and possible adverse effects of opioids. All of the patient's questions and concerns were addressed. They were instructed to flush the remaining pills down the toilet if they did not need them for pain.

## 2019-11-19 ENCOUNTER — APPOINTMENT (OUTPATIENT)
Dept: PULMONOLOGY | Facility: CLINIC | Age: 84
End: 2019-11-19

## 2020-03-18 NOTE — PHYSICAL THERAPY INITIAL EVALUATION ADULT - GAIT DISTANCE, PT EVAL
75 feet

## 2020-04-12 NOTE — ED ADULT NURSE NOTE - NS ED PATIENT SAFETY CONCERN
PEC completed- faxed to coroners office and called spoke to gretchen   2 bags of belongings lab led and placed in locked closet    Unable to assess due to medical condition

## 2021-03-20 NOTE — PROGRESS NOTE ADULT - PROBLEM SELECTOR PROBLEM 4
Problem: PAIN - ADULT  Goal: Verbalizes/displays adequate comfort level or baseline comfort level  Description: Interventions:  - Encourage patient to monitor pain and request assistance  - Assess pain using appropriate pain scale  - Administer analgesics based on type and severity of pain and evaluate response  - Implement non-pharmacological measures as appropriate and evaluate response  - Consider cultural and social influences on pain and pain management  - Notify physician/advanced practitioner if interventions unsuccessful or patient reports new pain  Outcome: Progressing     Problem: INFECTION - ADULT  Goal: Absence or prevention of progression during hospitalization  Description: INTERVENTIONS:  - Assess and monitor for signs and symptoms of infection  - Monitor lab/diagnostic results  - Monitor all insertion sites, i e  indwelling lines, tubes, and drains  - Monitor endotracheal if appropriate and nasal secretions for changes in amount and color  - [place] appropriate cooling/warming therapies per order  - Administer medications as ordered  - Instruct and encourage patient and family to use good hand hygiene technique  - Identify and instruct in appropriate isolation precautions for identified infection/condition  Outcome: Progressing     Problem: SAFETY ADULT  Goal: Patient will remain free of falls  Description: INTERVENTIONS:  - Assess patient frequently for physical needs  -  Identify cognitive and physical deficits and behaviors that affect risk of falls    -  [place] fall precautions as indicated by assessment   - Educate patient/family on patient safety including physical limitations  - Instruct patient to call for assistance with activity based on assessment  - Modify environment to reduce risk of injury  - Consider OT/PT consult to assist with strengthening/mobility  Outcome: Progressing  Goal: Maintain or return to baseline ADL function  Description: INTERVENTIONS:  -  Assess patient's ability to carry out ADLs; assess patient's baseline for ADL function and identify physical deficits which impact ability to perform ADLs (bathing, care of mouth/teeth, toileting, grooming, dressing, etc )  - Assess/evaluate cause of self-care deficits   - Assess range of motion  - Assess patient's mobility; develop plan if impaired  - Assess patient's need for assistive devices and provide as appropriate  - Encourage maximum independence but intervene and supervise when necessary  - Involve family in performance of ADLs  - Assess for home care needs following discharge   - Consider OT consult to assist with ADL evaluation and planning for discharge  - Provide patient education as appropriate  Outcome: Progressing  Goal: Maintain or return mobility status to optimal level  Description: INTERVENTIONS:  - Assess patient's baseline mobility status (ambulation, transfers, stairs, etc )    - Identify cognitive and physical deficits and behaviors that affect mobility  - Identify mobility aids required to assist with transfers and/or ambulation (gait belt, sit-to-stand, lift, walker, cane, etc )  - [place] fall precautions as indicated by assessment  - Record patient progress and toleration of activity level on Mobility SBAR; progress patient to next Phase/Stage  - Instruct patient to call for assistance with activity based on assessment  - Consider rehabilitation consult to assist with strengthening/weightbearing, etc   Outcome: Progressing     Problem: DISCHARGE PLANNING  Goal: Discharge to home or other facility with appropriate resources  Description: INTERVENTIONS:  - Identify barriers to discharge w/patient and caregiver  - Arrange for needed discharge resources and transportation as appropriate  - Identify discharge learning needs (meds, wound care, etc )  - Arrange for interpretive services to assist at discharge as needed  - Refer to Case Management Department for coordinating discharge planning if the patient needs post-hospital services based on physician/advanced practitioner order or complex needs related to functional status, cognitive ability, or social support system  Outcome: Progressing     Problem: Knowledge Deficit  Goal: Patient/family/caregiver demonstrates understanding of disease process, treatment plan, medications, and discharge instructions  Description: Complete learning assessment and assess knowledge base  Interventions:  - Provide teaching at level of understanding  - Provide teaching via preferred learning methods  Outcome: Progressing     Problem: Potential for Falls  Goal: Patient will remain free of falls  Description: INTERVENTIONS:  - Assess patient frequently for physical needs  -  Identify cognitive and physical deficits and behaviors that affect risk of falls    -  [place] fall precautions as indicated by assessment   - Educate patient/family on patient safety including physical limitations  - Instruct patient to call for assistance with activity based on assessment  - Modify environment to reduce risk of injury  - Consider OT/PT consult to assist with strengthening/mobility  Outcome: Progressing     Problem: RESPIRATORY - ADULT  Goal: Achieves optimal ventilation and oxygenation  Description: INTERVENTIONS:  - Assess for changes in respiratory status  - Assess for changes in mentation and behavior  - Position to facilitate oxygenation and minimize respiratory effort  - Oxygen administered by appropriate delivery if ordered  - Initiate smoking cessation education as indicated  - Encourage broncho-pulmonary hygiene including cough, deep breathe, Incentive Spirometry  - Assess the need for suctioning and aspirate as needed  - Assess and instruct to report SOB or any respiratory difficulty  - Respiratory Therapy support as indicated  Outcome: Progressing CKD (chronic kidney disease)

## 2021-08-31 NOTE — PHYSICAL THERAPY INITIAL EVALUATION ADULT - LEVEL OF CONSCIOUSNESS, REHAB EVAL
PRE-CARDIAC CATHETERIZATION NOTE    INDICATION FOR CATH LAB VISIT: (X all that apply)  ACS <= 24 hours: [  ]  ACS > 24 hours: [  ]  New Onset Angina <= 2 months: [  ]  Worsening Angina: [ x ]  Resuscitated Cardiac Arrest: [  ]  Stable known CAD: [  ] (Controlled with rest and/or antianginals AND without any change in frequency/pattern for the 6 weeks prior.)  Suspected CAD: [  ]  (The above two options contradict each other and should not be selected together.)  (Similarly, Stable Known CAD would never be selected with New Onset Angina nor Worsening Angina.)  Resuscitation Cardiac Arrest: [  ]  Valvular disease: [  ]  Pericardial disease: [  ]  Cardiac arrhythmia: [  ]  Cardiomyopathy: [  ]  LV dysfunction: [  ]  Syncope: [  ]  Post Cardiac Transplant: [  ]  Pre-operative Evaluation: [  ] If Yes, Cardiac surgery [  ]; Non Cardiac Surgery [  ]  Evaluation for Exercise Clearance: [  ]    CHEST PAIN SYMPTOMS: (skip if not applicable, i.e. dyspnea)  Typical Angina: [ x ]  Atypical Angina: [  ]  Non-anginal Chest Pain: [  ]  Asymptomatic: [  ]    CARDIOVASCULAR INSTABILITY: Yes [  ] or No [ x ]               If Yes, specify instability types: (X all that apply)  Persistent Ischemic Symptoms (chest pain): [ x ]  Hemodynamic Instability (not cardiogenic shock): [  ]  Cardiogenic Shock: [  ]  Refractory Cardiogenic Shock: [  ]  Acute Heart Failure Symptoms: [  ]  Ventricular Arrhythmia: [  ]    PAST MEDICAL HISTORY: (X all that apply)  PRE-CATH LVEF: [ 63 ] %  Prior MI: [  ]  Diabetes: [  ]  Peripheral vascular disease: [  ]  Hypertension: [ x  ]  Hyperlipidemia: [ x ]  Cerebrovascular disease: [  ]  Chronic lung disease: [  ]  CKD/ESRD on dialysis: [  ]    PAST SURGICAL/PROCEDURAL HISTORY:  Prior PCI date/stent locations: [  ]  Prior CABG - Vessels bypassed/ date: [  ]  Prior valve surgery date: [  ]    FAMILY HISTORY:  Immediate family history of premature coronary artery diseases [NO]  If YES Male relative age - Less  than 55 [ ]; Greater than 55 [ ]  If YES Female relative age - Less than 65 [ ]; Greater than 65 [ ]    PRE-PROCEDURAL MEDICATIONS:  Antiarrhythmic Agent: [  ]  Aspirin: [  ]  Beta Blocker: [ x ]  Ranolazine: [  ]  Calcium Channel Blocker: [ x ]  Long Acting Nitrate: [  ]  Statin: [ x ]  Anti-coagulant: [Coumadin/Eliquis/Xarelto/Pradaxa]; if Applicable Last Dose: [Date]  P2Y12-inhibitor: [Clopidogrel/Ticagrelor/Prasugrel]; if Applicable Last Dose: [Date]    STRESS OR IMAGING STUDIES:  Exercise stress test: [  ]  Stress echocardiogram: [  ]  Stress Nuclear: [ x ]  Cardiac CTA: [  ]  Coronary calcium score: [  ]  Result: Negative [ x ]; Positive [  ]; Indeterminant [ ]; Unavailable [ ]  Risk/extent of ischemia: Low [  ]; Intermediate [  ]; High [  ]; Unavailable [  ]       [facility] CLINICAL FRAILTY SCORE (especially important if PCI):  (1) Very fit (Robust, active, among the fittest for their age): [  ]   (2) Well (Occasionally active, no active disease symptoms): [  ]   (3) Managing well (Medical problems well controlled, but not regularly active): [ x ]   (4) Vulnerable (Symptoms limit activity; Patients with a current MI must be documented = 4): [  ]   (5) Mildly frail (More evident slowing, need help with higher order ADLs like finances, transportation, heavy housework, medications): [  ]   (6) Moderately frail (Need help with all outside activities): [  ]   (7) Severely frail (Completely dependent for personal care): [  ]   (8) Very severely frail (Completely dependent and approaching the end of life): [  ]   (9) Terminally ill (Approaching the end of with life expectancy <6 months; discuss with attending before using): [  ]       PLANNED ACCESS SITE(s):  Right Radial Artery: [ x ]  Left Radial Artery: [  ]  Right Femoral Artery: [  ]  Left Femoral Artery: [  ]  Right Brachial Vein: [  ]  Left Femoral Vein: [  ]  Right Femoral Vein: [  ]    CONSENT: Prior to the procedure, all risks including, but not limited to:  bleeding, vascular complications, dye allergy, contrast induced nephropathy, stroke, myocardial infarction, and death were discussed with the patient, who agreed to proceed.       confused/alert

## 2021-09-03 NOTE — PROGRESS NOTE ADULT - PROBLEM SELECTOR PLAN 4
[facility] ACUTE VISIT      Facility:  [facility]    Chief Complaint:  Patient is being seen to evaluate the effectiveness and appropriateness of ABH gel which was prescribed 2 weeks ago on 08/20/2021 due to extreme agitation with inability to console the patient.  Diagnostics and labs have been performed to rule out any acute medical concerns.  Staff report that the ABH gel is not always affective, and speaking with them today the prescription will be updated slightly to allow for a 2nd dose after 1 hour if the 1st is an affective.  A concern about uncontrolled pain was brought up today as edema at times the patient is uncomfortable.  She is already on scheduled Tylenol so she would have to be placed on something a bit stronger which would most likely make her more tired and sedated so before any medication changes are made this writer would like to discuss options with the patient's daughter/POA.  The patient herself is quite pleasant today and calm.  She does not remember meeting this writer in the past.  She denies having any pain and has no pain on palpation in her back or hips.  She thanked this writer for today's visit.  This is a sanabria contrast from the last interaction when the patient was screaming and stridor and was striking out at other staff members.    Avoid for the patient's daughter/POA at 11:19 a.m.    History of Present Illness:  Patient with past medical history of dementia with behavioral disturbance, GERD, dysphagia, anxiety          I have reviewed the patient's medications and allergies, past medical, surgical, social and family history, updating these as appropriate.  See Histories section of the EPIC for a display of this information.       Essential (primary) hypertension                              H/O colonoscopy                                 2012            Comment: bad reaction    Anxiety                                                       Gastroesophageal reflux  disease                                 Comment: Dysphagia      APPENDECTOMY                                                  TONSILLECTOMY AND ADENOIDECTOMY                               JOINT REPLACEMENT                                             HKAFO BILATERAL HIP JOINT                       2008, 2004      Comment: right,left    MEDICATIONS:   Current Outpatient Medications   Medication Sig Dispense Refill   • amoxicillin (AMOXIL) 500 MG capsule      • nystatin (MYCOSTATIN) 503264 UNIT/GM powder      • diphenhydrAMINE-haloperidol-LORazepam (ABH) 25-1-1 mg/mL gel Apply topically 4 times daily as needed for Other (agitation/ anxiety). Apply 1 application to skin 10 mL 0   • clonazePAM (KlonoPIN) 0.5 MG tablet Take 0.5 tablets by mouth 2 times daily. 30 tablet 1   • Cranberry 400 MG Tab Take 1 tablet by mouth daily. 30 tablet 0   • memantine (NAMENDA) 10 MG tablet Take 1 tablet by mouth 2 times daily. 60 tablet 0   • QUEtiapine (SEROquel) 25 MG tablet Take 1 tablet by mouth daily (at noon). Hold if drowsy. 60 tablet 0   • verapamil ER (VERELAN PM) 100 MG 24 hr capsule Take 1 capsule by mouth nightly. 30 capsule 0   • sodium chloride 1 g tablet Take 1 tablet by mouth daily. 30 tablet 0   • docusate sodium-sennosides (SENOKOT S) 50-8.6 MG per tablet Take 1 tablet by mouth daily.     • acetaminophen (TYLENOL) 325 MG tablet Take 2 tablets by mouth 3 times daily. May also take 2 tablets 3 times daily as needed for Pain or Fever. 30 tablet 0   • ibuprofen (MOTRIN) 600 MG tablet Take 1 tablet by mouth 3 times daily as needed for Pain. 30 tablet 0   • bisacodyl (DULCOLAX) 10 MG suppository Place 1 suppository rectally daily as needed for Constipation.     • propranolol (INDERAL) 10 MG tablet TAKE 1 TABLET BY MOUTH TWICE A  tablet 3     No current facility-administered medications for this visit.       ALLERGIES:   ALLERGIES:  No Known Allergies    SOCIAL HISTORY:  Social History     Socioeconomic History   •  Marital status:      Spouse name: Not on file   • Number of children: 2   • Years of education: Not on file   • Highest education level: Not on file   Occupational History   • Not on file   Tobacco Use   • Smoking status: Former Smoker   • Smokeless tobacco: Never Used   Substance and Sexual Activity   • Alcohol use: Yes     Alcohol/week: 1.0 standard drink     Types: 1 Cans of beer per week   • Drug use: No   • Sexual activity: Not on file   Other Topics Concern   • Not on file   Social History Narrative   • Not on file     Social Determinants of Health     Financial Resource Strain:    • Social Determinants: Financial Resource Strain:    Food Insecurity:    • Social Determinants: Food Insecurity:    Transportation Needs:    • Lack of Transportation (Medical):    • Lack of Transportation (Non-Medical):    Physical Activity:    • Days of Exercise per Week:    • Minutes of Exercise per Session:    Stress:    • Social Determinants: Stress:    Social Connections:    • Social Determinants: Social Connections:    Intimate Partner Violence:    • Social Determinants: Intimate Partner Violence Past Fear:    • Social Determinants: Intimate Partner Violence Current Fear:        REVIEW OF SYMPTOMS:    CONSTITUTIONAL: Denies fever, chills, malaise, headache or weight change   EENT:  Denies vision changes   SKIN: Denies rash or pruritis   RESPIRATORY:  Denies shortness of breath or cough. Denies history of sleep apnea.  CARDIAC:  Denies chest pain or palpitations, denies edema  GI:  Denies nausea ,vomiting, difficulty swallowing, diarrhea, hematemesis, constipation, appetite changes, or melena, reports \"I eat well\"  :  Denies flank pain, hematuria, dysuria, frequency  MUSCULOSKELETAL:  Denies any musculoskeletal pain, joint pain or swelling but does report she has some stiffness in her left hip when she raises her leg.  NEUROLOGICAL:  Denies dizziness or gait abnormality, denies syncope  PSYCH:  Denies insomnia or mood  changes                 PHYSICAL EXAMINATION:   GENERAL: The patient is alert, awake, she is calm and very pleasant at this time. She is moving about the public area in her W/c.  VITAL SIGNS:  Visit Vitals  /74   Pulse 62   Temp 97 °F (36.1 °C)   Resp 14   Wt 51.3 kg   SpO2 97%   BMI 18.80 kg/m²   Body mass index is 18.8 kg/m².  HEENT:  PERRLA (Pupils equal, round, reactive to light and accommodation), sclera are clear bilaterally, EOMI's (Extraocular movements intact). Tympanic membranes are pearly gray, external canals are clear. Nares are patent with no swelling of the turbinates noted. Oropharynx is moist, no injection, uvula midline, no trismus. Trachea is midline.  Neck:  No anterior or posterior cervical lymphadenopathy. No thyromegaly. No masses. No carotid bruits noted bilaterally.  Lungs:   wheezes. Normal respiratory effort.  Heart:  S1S2, regular rate and rhythm  Abdomen: soft with active bowel sounds  Extremity:  Intact distal pulses, No edema, No tenderness, No cyanosis, No clubbing. Scab and bruise noted to left shin  Neurologic:  Cranial nerves II through XII are grossly intact. Smile symmetrical.  Patient gets around with a wheelchair    Labs:   WBC (K/mcL)   Date Value   08/24/2021 6.8     RBC (mil/mcL)   Date Value   08/24/2021 3.77 (L)     HCT (%)   Date Value   08/24/2021 34.2 (L)     HGB (g/dL)   Date Value   08/24/2021 10.3 (L)     PLT (K/mcL)   Date Value   08/24/2021 246     Lab Results   Component Value Date    SODIUM 142 08/24/2021    POTASSIUM 4.0 08/24/2021    CHLORIDE 111 (H) 08/24/2021    CO2 27 08/24/2021    ANIONGAP 8 (L) 08/24/2021    GLUCOSE 79 08/24/2021    BUN 19 08/24/2021    CREATININE 0.69 08/24/2021    GFRA >90 01/30/2020    GFRNA 81 01/30/2020    BCRAT 28 (H) 08/24/2021    CALCIUM 8.8 08/24/2021    BILIRUBIN 0.3 08/24/2021    AST 31 08/24/2021    GPT 23 08/24/2021    ALKPT 90 08/24/2021    TOTPROTEIN 6.2 (L) 08/24/2021    ALBUMIN 3.0 (L) 08/24/2021    GLOB 3.2  08/24/2021    AGR 0.9 (L) 08/24/2021             ASSESSMENT/PLAN:  Late onset Alzheimer's dementia with behavioral disturbance (CMS/HCC)  (primary encounter diagnosis)  Plan:  Continue ABH gel QID prn, will add that a second dose can be given after one hour is ineffective. It possible the patient has some pain that is not relieved by the scheduled tylenol. Will discuss other pain options with the daughter, voicemail left.  Patient is appropriate for hospice/ comfort care. Will plan to discuss this with the daughter as well.         No follow-ups on file.    Total time spent is more than  25 minutes, with more than 50% of the time spent in coordination of care, counseling, review of records and discussion of plan of care with the patient /staff /family.     Chen CHAVEZ  9/3/21   - Uclx; klebsiella   - CTX 1g 2- stop 7/8 for 5 day course. 4/5 Day

## 2021-09-24 NOTE — ED PROCEDURE NOTE - CPROC ED COMPLICATIONS1
Follow up appointment in 2 weeks. Return to dental clinic for routine exams and cleanings every 6 months
no

## 2021-10-28 NOTE — PROGRESS NOTE ADULT - PROBLEM SELECTOR PROBLEM 8
COPD (chronic obstructive pulmonary disease)
normal (ped)...

## 2022-05-19 NOTE — H&P ADULT. - PROBLEM SELECTOR PLAN 3
1st Attempt at scheduling         Jef Knight  [phone]    Unable to contact     Left voice mail for call back -stable  -c/w keppra, will check keppra level in AM

## 2022-07-26 NOTE — DIETITIAN INITIAL EVALUATION ADULT. - PROBLEM SELECTOR PLAN 5
Patient w/ troponin 0.11 on admission, likely 2/2 shock; ACS less likely given no ischemic changes on EKG  Will check repeat troponin Complex Repair And M Plasty Text: The defect edges were debeveled with a #15 scalpel blade.  The primary defect was closed partially with a complex linear closure.  Given the location of the remaining defect, shape of the defect and the proximity to free margins an M plasty was deemed most appropriate for complete closure of the defect.  Using a sterile surgical marker, an appropriate advancement flap was drawn incorporating the defect and placing the expected incisions within the relaxed skin tension lines where possible.    The area thus outlined was incised deep to adipose tissue with a #15 scalpel blade.  The skin margins were undermined to an appropriate distance in all directions utilizing iris scissors.
